# Patient Record
Sex: MALE | Race: WHITE | NOT HISPANIC OR LATINO | Employment: FULL TIME | URBAN - METROPOLITAN AREA
[De-identification: names, ages, dates, MRNs, and addresses within clinical notes are randomized per-mention and may not be internally consistent; named-entity substitution may affect disease eponyms.]

---

## 2023-01-31 PROBLEM — M25.859 FEMOROACETABULAR IMPINGEMENT: Status: ACTIVE | Noted: 2023-01-31

## 2023-01-31 PROBLEM — N40.0 BPH WITHOUT URINARY OBSTRUCTION: Status: ACTIVE | Noted: 2023-01-31

## 2023-01-31 PROBLEM — M54.50 CHRONIC LOW BACK PAIN: Status: ACTIVE | Noted: 2023-01-31

## 2023-01-31 PROBLEM — R82.998 DARK URINE: Status: ACTIVE | Noted: 2023-01-31

## 2023-01-31 PROBLEM — R53.83 FATIGUE: Status: ACTIVE | Noted: 2023-01-31

## 2023-01-31 PROBLEM — R10.9 ABDOMINAL PAIN: Status: ACTIVE | Noted: 2023-01-31

## 2023-01-31 PROBLEM — G89.4 CHRONIC PAIN SYNDROME: Status: ACTIVE | Noted: 2023-01-31

## 2023-01-31 PROBLEM — B37.49 YEAST DERMATITIS OF PENIS: Status: ACTIVE | Noted: 2023-01-31

## 2023-01-31 PROBLEM — J30.9 ALLERGIC RHINITIS: Status: ACTIVE | Noted: 2023-01-31

## 2023-01-31 PROBLEM — R10.84 ABDOMINAL CRAMPING, GENERALIZED: Status: ACTIVE | Noted: 2023-01-31

## 2023-01-31 PROBLEM — I10 HYPERTENSION, ESSENTIAL: Status: ACTIVE | Noted: 2023-01-31

## 2023-01-31 PROBLEM — J06.9 URTI (ACUTE UPPER RESPIRATORY INFECTION): Status: ACTIVE | Noted: 2023-01-31

## 2023-01-31 PROBLEM — E55.9 VITAMIN D DEFICIENCY: Status: ACTIVE | Noted: 2023-01-31

## 2023-01-31 PROBLEM — R73.9 HYPERGLYCEMIA: Status: ACTIVE | Noted: 2023-01-31

## 2023-01-31 PROBLEM — G47.00 INSOMNIA: Status: ACTIVE | Noted: 2023-01-31

## 2023-01-31 PROBLEM — R31.9 HEMATURIA, UNDIAGNOSED CAUSE: Status: ACTIVE | Noted: 2023-01-31

## 2023-01-31 PROBLEM — M47.812 ARTHRITIS OF NECK: Status: ACTIVE | Noted: 2023-01-31

## 2023-01-31 PROBLEM — E78.5 HYPERLIPIDEMIA: Status: ACTIVE | Noted: 2023-01-31

## 2023-01-31 PROBLEM — M54.81 CERVICO-OCCIPITAL NEURALGIA: Status: ACTIVE | Noted: 2023-01-31

## 2023-01-31 PROBLEM — G62.9 PERIPHERAL NEUROPATHY: Status: ACTIVE | Noted: 2023-01-31

## 2023-01-31 PROBLEM — K57.32 DIVERTICULITIS OF RECTOSIGMOID: Status: ACTIVE | Noted: 2023-01-31

## 2023-01-31 PROBLEM — L43.9 LICHEN PLANUS: Status: ACTIVE | Noted: 2023-01-31

## 2023-01-31 PROBLEM — R10.84 ABDOMINAL DISCOMFORT, GENERALIZED: Status: ACTIVE | Noted: 2023-01-31

## 2023-01-31 PROBLEM — K59.00 CONSTIPATION: Status: ACTIVE | Noted: 2023-01-31

## 2023-01-31 PROBLEM — R63.0 DECREASED APPETITE: Status: ACTIVE | Noted: 2023-01-31

## 2023-01-31 PROBLEM — G89.29 CHRONIC LOW BACK PAIN: Status: ACTIVE | Noted: 2023-01-31

## 2023-01-31 PROBLEM — G89.29 CHRONIC PAIN OF LEFT KNEE: Status: ACTIVE | Noted: 2023-01-31

## 2023-01-31 PROBLEM — M25.562 CHRONIC PAIN OF LEFT KNEE: Status: ACTIVE | Noted: 2023-01-31

## 2023-01-31 PROBLEM — Q64.4 URACHAL CYST: Status: ACTIVE | Noted: 2023-01-31

## 2023-01-31 PROBLEM — M19.90 DJD (DEGENERATIVE JOINT DISEASE): Status: ACTIVE | Noted: 2023-01-31

## 2023-01-31 RX ORDER — ZOLPIDEM TARTRATE 10 MG/1
1 TABLET ORAL NIGHTLY PRN
COMMUNITY
Start: 2022-10-06 | End: 2023-03-17 | Stop reason: SDUPTHER

## 2023-01-31 RX ORDER — CYCLOBENZAPRINE HCL 10 MG
1 TABLET ORAL NIGHTLY PRN
COMMUNITY
Start: 2017-02-06 | End: 2023-10-04 | Stop reason: SDUPTHER

## 2023-01-31 RX ORDER — HYOSCYAMINE SULFATE 0.12 MG/1
TABLET, ORALLY DISINTEGRATING ORAL
COMMUNITY
Start: 2022-04-06 | End: 2023-03-14 | Stop reason: ALTCHOICE

## 2023-01-31 RX ORDER — FAMOTIDINE 20 MG/1
1 TABLET, FILM COATED ORAL DAILY
COMMUNITY
End: 2023-03-17 | Stop reason: SDUPTHER

## 2023-01-31 RX ORDER — TACROLIMUS 1 MG/G
OINTMENT TOPICAL 2 TIMES DAILY
COMMUNITY
Start: 2022-01-13

## 2023-01-31 RX ORDER — ACETAMINOPHEN 325 MG/1
TABLET ORAL
COMMUNITY
Start: 2022-04-14 | End: 2023-03-14 | Stop reason: ALTCHOICE

## 2023-01-31 RX ORDER — ROSUVASTATIN CALCIUM 10 MG/1
1 TABLET, COATED ORAL DAILY
COMMUNITY
Start: 2022-10-03 | End: 2023-03-17 | Stop reason: SDUPTHER

## 2023-01-31 RX ORDER — BENZONATATE 200 MG/1
200 CAPSULE ORAL 3 TIMES DAILY PRN
COMMUNITY
End: 2023-03-14 | Stop reason: ALTCHOICE

## 2023-01-31 RX ORDER — IBUPROFEN 400 MG/1
1 TABLET ORAL EVERY 6 HOURS PRN
COMMUNITY
End: 2023-12-30 | Stop reason: HOSPADM

## 2023-03-14 ENCOUNTER — OFFICE VISIT (OUTPATIENT)
Dept: PRIMARY CARE | Facility: CLINIC | Age: 58
End: 2023-03-14
Payer: COMMERCIAL

## 2023-03-14 VITALS
DIASTOLIC BLOOD PRESSURE: 87 MMHG | SYSTOLIC BLOOD PRESSURE: 151 MMHG | OXYGEN SATURATION: 99 % | WEIGHT: 197 LBS | HEIGHT: 71 IN | HEART RATE: 84 BPM | BODY MASS INDEX: 27.58 KG/M2

## 2023-03-14 DIAGNOSIS — M47.812 ARTHRITIS OF NECK: ICD-10-CM

## 2023-03-14 DIAGNOSIS — I10 HYPERTENSION, ESSENTIAL: Primary | ICD-10-CM

## 2023-03-14 DIAGNOSIS — I10 HYPERTENSION, UNSPECIFIED TYPE: ICD-10-CM

## 2023-03-14 DIAGNOSIS — E78.5 HYPERLIPIDEMIA, UNSPECIFIED HYPERLIPIDEMIA TYPE: ICD-10-CM

## 2023-03-14 DIAGNOSIS — Z02.83 ENCOUNTER FOR DRUG SCREENING: ICD-10-CM

## 2023-03-14 DIAGNOSIS — J32.9 SINUSITIS, UNSPECIFIED CHRONICITY, UNSPECIFIED LOCATION: ICD-10-CM

## 2023-03-14 DIAGNOSIS — K21.9 GERD WITHOUT ESOPHAGITIS: ICD-10-CM

## 2023-03-14 DIAGNOSIS — F51.01 PRIMARY INSOMNIA: ICD-10-CM

## 2023-03-14 PROBLEM — R53.83 FATIGUE: Status: RESOLVED | Noted: 2023-01-31 | Resolved: 2023-03-14

## 2023-03-14 PROBLEM — J06.9 URTI (ACUTE UPPER RESPIRATORY INFECTION): Status: RESOLVED | Noted: 2023-01-31 | Resolved: 2023-03-14

## 2023-03-14 PROBLEM — R73.9 HYPERGLYCEMIA: Status: RESOLVED | Noted: 2023-01-31 | Resolved: 2023-03-14

## 2023-03-14 PROBLEM — R63.0 DECREASED APPETITE: Status: RESOLVED | Noted: 2023-01-31 | Resolved: 2023-03-14

## 2023-03-14 PROBLEM — E55.9 VITAMIN D DEFICIENCY: Status: RESOLVED | Noted: 2023-01-31 | Resolved: 2023-03-14

## 2023-03-14 PROCEDURE — 80365 DRUG SCREENING OXYCODONE: CPT

## 2023-03-14 PROCEDURE — 3077F SYST BP >= 140 MM HG: CPT | Performed by: FAMILY MEDICINE

## 2023-03-14 PROCEDURE — 1036F TOBACCO NON-USER: CPT | Performed by: FAMILY MEDICINE

## 2023-03-14 PROCEDURE — 80358 DRUG SCREENING METHADONE: CPT

## 2023-03-14 PROCEDURE — 80354 DRUG SCREENING FENTANYL: CPT

## 2023-03-14 PROCEDURE — 80373 DRUG SCREENING TRAMADOL: CPT

## 2023-03-14 PROCEDURE — 3079F DIAST BP 80-89 MM HG: CPT | Performed by: FAMILY MEDICINE

## 2023-03-14 PROCEDURE — 80307 DRUG TEST PRSMV CHEM ANLYZR: CPT

## 2023-03-14 PROCEDURE — 80361 OPIATES 1 OR MORE: CPT

## 2023-03-14 PROCEDURE — 99214 OFFICE O/P EST MOD 30 MIN: CPT | Performed by: FAMILY MEDICINE

## 2023-03-14 PROCEDURE — 80346 BENZODIAZEPINES1-12: CPT

## 2023-03-14 PROCEDURE — 80368 SEDATIVE HYPNOTICS: CPT

## 2023-03-14 RX ORDER — AZELASTINE 1 MG/ML
1 SPRAY, METERED NASAL 2 TIMES DAILY
Qty: 30 ML | Refills: 2 | Status: SHIPPED | OUTPATIENT
Start: 2023-03-14 | End: 2023-10-25

## 2023-03-14 ASSESSMENT — ENCOUNTER SYMPTOMS: SINUS PRESSURE: 1

## 2023-03-14 ASSESSMENT — PATIENT HEALTH QUESTIONNAIRE - PHQ9
2. FEELING DOWN, DEPRESSED OR HOPELESS: NOT AT ALL
1. LITTLE INTEREST OR PLEASURE IN DOING THINGS: NOT AT ALL
SUM OF ALL RESPONSES TO PHQ9 QUESTIONS 1 & 2: 0

## 2023-03-14 NOTE — PATIENT INSTRUCTIONS
Decrease in the amount of salt in your diet.   Begin taking your Blood Pressure 2x per day for the next week and writing down your results.   Your goal is to be less than 130/80,  if you have two or more readings that are greater than the goal, please call the office.

## 2023-03-14 NOTE — PROGRESS NOTES
"Subjective   Patient ID: Sharath Weiss is a 57 y.o. male who presents for 6 month medical management.    HPI     Review of Systems   HENT:  Positive for congestion and sinus pressure.         Tinnitus   All other systems reviewed and are negative.      Objective   /87   Pulse 84   Ht 1.803 m (5' 11\")   Wt 89.4 kg (197 lb)   SpO2 99%   BMI 27.48 kg/m²   Rechecked 151/87    Physical Exam  Vitals reviewed.   Constitutional:       Appearance: Normal appearance. He is normal weight.   HENT:      Head: Normocephalic and atraumatic.      Right Ear: Tympanic membrane, ear canal and external ear normal.      Left Ear: Tympanic membrane, ear canal and external ear normal.      Nose: Nose normal.      Mouth/Throat:      Mouth: Mucous membranes are moist.      Pharynx: Oropharynx is clear.   Eyes:      Conjunctiva/sclera: Conjunctivae normal.   Cardiovascular:      Rate and Rhythm: Normal rate and regular rhythm.   Pulmonary:      Effort: Pulmonary effort is normal.      Breath sounds: Normal breath sounds.   Abdominal:      General: Abdomen is flat. Bowel sounds are normal.      Palpations: Abdomen is soft.   Musculoskeletal:      Cervical back: Normal range of motion and neck supple.   Skin:     General: Skin is warm and dry.   Neurological:      General: No focal deficit present.      Mental Status: He is alert and oriented to person, place, and time.   Psychiatric:         Mood and Affect: Mood normal.         Behavior: Behavior normal.         Assessment/Plan   Diagnoses and all orders for this visit:  Hypertension, essential  Hypertension, unspecified type  -     Comprehensive Metabolic Panel; Future  Educated on DASH diet, Home BP log x1 week.  Encounter for drug screening  -     Opiate/Opioid/Benzo Extended Prescription Compliance  Hyperlipidemia, unspecified hyperlipidemia type  -     Lipid Panel  Primary insomnia  -  Continue ambien every day. It continues to provide adequate sleep for the patient. The " patient reports no hallucinations/sleep walking.   Arthritis of neck  Sinusitis, unspecified chronicity, unspecified location  -     azelastine (Astelin) 137 mcg (0.1 %) nasal spray; Administer 1 spray into each nostril in the morning and 1 spray before bedtime. Use in each nostril as directed.

## 2023-03-14 NOTE — PROGRESS NOTES
"Subjective   Patient ID: Sharath Weiss is a 57 y.o. male who presents for 6 month medical management.    HPI   Compliant with medications.    Insomnia controlled with Ambien taken nightly.  Denies side effects  Does not exercise regularly due to chronic low back pains Using Flexeril sparingly.    GERD symptoms minimal as long as he takes Pepcid    Intermittent abdominal pain has lessened.  GI Evaluation has been negative so far.  Levsin was ineffective.       Review of Systems   HENT:  Positive for congestion and sinus pressure.         Tinnitus   All other systems reviewed and are negative.      Objective   /81   Pulse 84   Ht 1.803 m (5' 11\")   Wt 89.4 kg (197 lb)   SpO2 99%   BMI 27.48 kg/m²   Rechecked 151/87    Physical Exam  PHYSICAL EXAM:  Alert and oriented x3.  Eyes: EOM grossly intact  Neck supple without lymph adenopathy or carotid bruit.  No masses or thyromegaly  Heart regular rate and rhythm without murmur.  Lungs clear to auscultation.  Legs without edema.  Gait is non-antalgic  Speech clear.  Hearing adequate.   General Medical Management Note        Past Medical History:   Diagnosis Date    Bilateral primary osteoarthritis of knee     Primary osteoarthritis of both knees    Pain in left hip 10/30/2017    Hip pain, acute, left    Personal history of nicotine dependence 07/21/2019    History of nicotine dependence    Personal history of other diseases of the digestive system 03/30/2016    History of esophageal reflux    Personal history of other diseases of the digestive system 12/28/2015    History of esophageal ulcer    Primary osteoarthritis, unspecified shoulder     Osteoarthritis of shoulder region    Pyogenic arthritis, unspecified (CMS/[facility]) 05/03/2018    Septic arthritis of knee    Pyogenic arthritis, unspecified (CMS/[facility])     Septic arthritis of knee    Sleep disorder, unspecified     Disturbance of sleep    Spondylosis without myelopathy or radiculopathy, site unspecified     " Arthritis of low back    Tobacco abuse counseling 03/02/2017    Encounter for smoking cessation counseling      Past Surgical History:   Procedure Laterality Date    ANTERIOR CRUCIATE LIGAMENT REPAIR  04/24/2015    Primary Repair Of Knee Ligament Cruciate Anterior    COLONOSCOPY  11/02/2022    Complete Colonoscopy    KNEE ARTHROSCOPY W/ DEBRIDEMENT  04/24/2015    Knee Arthroscopy (Therapeutic)    KNEE SURGERY  02/21/2018    Knee Surgery Left    LUMBAR FUSION  04/24/2015    Lumbar Vertebral Fusion    LUMBAR LAMINECTOMY  04/24/2015    Laminectomy Lumbar    OTHER SURGICAL HISTORY  07/13/2020    Epidural steroid injection    OTHER SURGICAL HISTORY  02/22/2018    Arthroscopy Shoulder Right     Family History   Problem Relation Name Age of Onset    Cancer Mother      Hypertension Father      Irritable bowel syndrome Sister        Social History     Socioeconomic History    Marital status:      Spouse name: Not on file    Number of children: Not on file    Years of education: Not on file    Highest education level: Not on file   Occupational History    Not on file   Tobacco Use    Smoking status: Never    Smokeless tobacco: Never   Vaping Use    Vaping status: Not on file   Substance and Sexual Activity    Alcohol use: Not on file    Drug use: Not on file    Sexual activity: Not on file   Other Topics Concern    Not on file   Social History Narrative    Not on file     Social Determinants of Health     Financial Resource Strain: Not on file   Food Insecurity: Not on file   Transportation Needs: Not on file   Physical Activity: Not on file   Stress: Not on file   Social Connections: Not on file   Intimate Partner Violence: Not on file   Housing Stability: Not on file       Current Outpatient Medications on File Prior to Visit   Medication Sig Dispense Refill    cyclobenzaprine (Flexeril) 10 mg tablet Take 1 tablet (10 mg) by mouth as needed at bedtime.      famotidine (Pepcid) 20 mg tablet Take 1 tablet (20 mg) by  "mouth once daily.      ibuprofen 400 mg tablet Take 1 tablet (400 mg) by mouth every 6 hours if needed for mild pain (1 - 3).      Lidocaine, diphenhydrAMINE, Maalox 1:1:1 (Magic Mouthwash) 500--40 mg/30 mL liquid mouthwash Take 30 mL by mouth 3 times a day as needed (severe bdominal pain).      rosuvastatin (Crestor) 10 mg tablet Take 1 tablet (10 mg) by mouth once daily.      tacrolimus (Protopic) 0.1 % ointment in the morning and at bedtime. APPLY AND GENTLY MASSAGE INTO AFFECTED AREA(S)      zolpidem (Ambien) 10 mg tablet Take 1 tablet (10 mg) by mouth as needed at bedtime.      [DISCONTINUED] acetaminophen (Tylenol) 325 mg tablet Take by mouth.      [DISCONTINUED] benzonatate (Tessalon) 200 mg capsule Take 1 capsule (200 mg) by mouth 3 times a day as needed. Do not crush or chew.      [DISCONTINUED] hyoscyamine (Anaspaz) 0.125 mg disintegrating tablet Place under the tongue.       No current facility-administered medications on file prior to visit.       No Known Allergies      ROS: Denies chest pain, SOB, Headache, GI problems     Visit Vitals  /87   Pulse 84   Ht 1.803 m (5' 11\")   Wt 89.4 kg (197 lb)   SpO2 99%   BMI 27.48 kg/m²   Smoking Status Never   BSA 2.12 m²        PHYSICAL EXAM:  Alert and oriented x3.  Eyes: EOM grossly intact  Neck supple without lymph adenopathy or carotid bruit.  No masses or thyromegaly  Heart regular rate and rhythm without murmur.  Lungs clear to auscultation.  Legs without edema.  Gait is non-antalgic  Speech clear.  Hearing adequate.          DIAGNOSIS/PLAN:  2. Encounter for drug screening  - Opiate/Opioid/Benzo Extended Prescription Compliance    4. Hyperlipidemia, unspecified hyperlipidemia type  Hyperlipidemia:  Patient to continue medication.  Emphasize diet and exercise.  Explained importance of risk factor modification.  Emphasized importance of compliance to decrease risk for heart attack, stroke and peripheral artery disease.    - Lipid Panel  - " rosuvastatin (Crestor) 10 mg tablet; Take 1 tablet (10 mg) by mouth once daily.  Dispense: 90 tablet; Refill: 2    5. Primary insomnia  CONTROLLED SUBSTANCE USE:   Patient is aware of Dr. Barney's and Vanessa Quintana's practice rules for use of scheduled medication.  Has a signed contract stating that patient will only receive controlled substance prescriptions from Dr. Barney, will only receive a one month supply, will fill prescriptions at one pharmacy, and agrees to a random urine drug screen.  Patient is aware that she must have an office appointment every 90 days to continue to receive benzodiazepines or narcotics.    - zolpidem (Ambien) 10 mg tablet; Take 1 tablet (10 mg) by mouth as needed at bedtime for sleep.  Dispense: 90 tablet; Refill: 1    7. Sinusitis, unspecified chronicity, unspecified location  - Omnicef 300mg BID x 10 day  - azelastine (Astelin) 137 mcg (0.1 %) nasal spray; Administer 1 spray into each nostril in the morning and 1 spray before bedtime. Use in each nostril as directed.  Dispense: 30 mL; Refill: 2    8. GERD without esophagitis  - famotidine (Pepcid) 20 mg tablet; Take 1 tablet (20 mg) by mouth in the morning and 1 tablet (20 mg) before bedtime.  Dispense: 180 tablet; Refill: 2        Return to office in 6 months for comprehensive medical evaluation, long-term medication use monitoring, and preventative services screening    Will continue to monitor, evaluate, assess and treat all problems/diagnoses as appropriate and continue to collaborate with specialists.    Encouraged to sign up with [facility] / Follow My Health     Contact office or send a Follow My Health message with any questions or concerns    Patient will only be notified of labs that require medical intervention.    Prescriptions will not be filled unless you are compliant with your follow up appointments or have a follow up appointment scheduled as per instruction of your physician. Refills should be requested at the time of  your visit.    **Charting was completed using voice recognition technology and may include unintended errors**    Buster Barney DO, FACOFP  Senior Attending Physician  [facility]  [address], [address]  [place] [address] [phone]           Buster Barnye DO, FACOFP          Assessment/Plan   Diagnoses and all orders for this visit:  Hypertension, essential  Hypertension, unspecified type  -     Comprehensive Metabolic Panel; Future  Encounter for drug screening  -     Opiate/Opioid/Benzo Extended Prescription Compliance  Hyperlipidemia, unspecified hyperlipidemia type  -     Lipid Panel  Primary insomnia  Arthritis of neck  Allergic rhinitis, unspecified seasonality, unspecified trigger  Sinusitis, unspecified chronicity, unspecified location  -     azelastine (Astelin) 137 mcg (0.1 %) nasal spray; Administer 1 spray into each nostril in the morning and 1 spray before bedtime. Use in each nostril as directed.  Other orders  -     Follow Up In Primary Care; Future

## 2023-03-16 ENCOUNTER — TELEPHONE (OUTPATIENT)
Dept: PRIMARY CARE | Facility: CLINIC | Age: 58
End: 2023-03-16
Payer: COMMERCIAL

## 2023-03-16 DIAGNOSIS — J32.9 SINUSITIS, UNSPECIFIED CHRONICITY, UNSPECIFIED LOCATION: Primary | ICD-10-CM

## 2023-03-16 LAB
6-ACETYLMORPHINE: <25 NG/ML
7-AMINOCLONAZEPAM: <25 NG/ML
ALPHA-HYDROXYALPRAZOLAM: <25 NG/ML
ALPHA-HYDROXYMIDAZOLAM: <25 NG/ML
ALPRAZOLAM: <25 NG/ML
AMPHETAMINE (PRESENCE) IN URINE BY SCREEN METHOD: ABNORMAL
BARBITURATES PRESENCE IN URINE BY SCREEN METHOD: ABNORMAL
CANNABINOIDS IN URINE BY SCREEN METHOD: ABNORMAL
CHLORDIAZEPOXIDE: <25 NG/ML
CLONAZEPAM: <25 NG/ML
COCAINE (PRESENCE) IN URINE BY SCREEN METHOD: ABNORMAL
CODEINE: <50 NG/ML
CREATINE, URINE FOR DRUG: 55.3 MG/DL
DIAZEPAM: <25 NG/ML
DRUG SCREEN COMMENT URINE: ABNORMAL
EDDP: <25 NG/ML
FENTANYL CONFIRMATION, URINE: <2.5 NG/ML
HYDROCODONE: <25 NG/ML
HYDROMORPHONE: <25 NG/ML
LORAZEPAM: <25 NG/ML
METHADONE CONFIRMATION,URINE: <25 NG/ML
MIDAZOLAM: <25 NG/ML
MORPHINE URINE: <50 NG/ML
NORDIAZEPAM: <25 NG/ML
NORFENTANYL: <2.5 NG/ML
NORHYDROCODONE: <25 NG/ML
NOROXYCODONE: <25 NG/ML
O-DESMETHYLTRAMADOL: <50 NG/ML
OXAZEPAM: <25 NG/ML
OXYCODONE: <25 NG/ML
OXYMORPHONE: <25 NG/ML
PHENCYCLIDINE (PRESENCE) IN URINE BY SCREEN METHOD: ABNORMAL
TEMAZEPAM: <25 NG/ML
TRAMADOL: <50 NG/ML
ZOLPIDEM METABOLITE (ZCA): >1000 NG/ML
ZOLPIDEM: <25 NG/ML

## 2023-03-17 RX ORDER — FAMOTIDINE 20 MG/1
20 TABLET, FILM COATED ORAL 2 TIMES DAILY
Qty: 180 TABLET | Refills: 2 | Status: SHIPPED
Start: 2023-03-17 | End: 2023-12-01 | Stop reason: WASHOUT

## 2023-03-17 RX ORDER — ROSUVASTATIN CALCIUM 10 MG/1
10 TABLET, COATED ORAL DAILY
Qty: 90 TABLET | Refills: 2 | Status: SHIPPED | OUTPATIENT
Start: 2023-03-17 | End: 2023-10-04 | Stop reason: SDUPTHER

## 2023-03-17 RX ORDER — ZOLPIDEM TARTRATE 10 MG/1
10 TABLET ORAL NIGHTLY PRN
Qty: 90 TABLET | Refills: 1 | Status: SHIPPED | OUTPATIENT
Start: 2023-03-17 | End: 2023-10-04 | Stop reason: SDUPTHER

## 2023-03-17 RX ORDER — CEFDINIR 300 MG/1
300 CAPSULE ORAL 2 TIMES DAILY
Qty: 20 CAPSULE | Refills: 0 | Status: SHIPPED | OUTPATIENT
Start: 2023-03-17 | End: 2023-03-27

## 2023-03-18 ENCOUNTER — LAB (OUTPATIENT)
Dept: LAB | Facility: LAB | Age: 58
End: 2023-03-18
Payer: COMMERCIAL

## 2023-03-18 DIAGNOSIS — I10 HYPERTENSION, ESSENTIAL: ICD-10-CM

## 2023-03-18 LAB
ALANINE AMINOTRANSFERASE (SGPT) (U/L) IN SER/PLAS: 21 U/L (ref 10–52)
ALBUMIN (G/DL) IN SER/PLAS: 4.8 G/DL (ref 3.4–5)
ALKALINE PHOSPHATASE (U/L) IN SER/PLAS: 44 U/L (ref 33–120)
ANION GAP IN SER/PLAS: 12 MMOL/L (ref 10–20)
ASPARTATE AMINOTRANSFERASE (SGOT) (U/L) IN SER/PLAS: 20 U/L (ref 9–39)
BILIRUBIN TOTAL (MG/DL) IN SER/PLAS: 0.5 MG/DL (ref 0–1.2)
CALCIUM (MG/DL) IN SER/PLAS: 9.5 MG/DL (ref 8.6–10.3)
CARBON DIOXIDE, TOTAL (MMOL/L) IN SER/PLAS: 29 MMOL/L (ref 21–32)
CHLORIDE (MMOL/L) IN SER/PLAS: 103 MMOL/L (ref 98–107)
CHOLESTEROL (MG/DL) IN SER/PLAS: 158 MG/DL (ref 0–199)
CHOLESTEROL IN HDL (MG/DL) IN SER/PLAS: 61.1 MG/DL
CHOLESTEROL/HDL RATIO: 2.6
CREATININE (MG/DL) IN SER/PLAS: 1.07 MG/DL (ref 0.5–1.3)
GFR MALE: 81 ML/MIN/1.73M2
GLUCOSE (MG/DL) IN SER/PLAS: 99 MG/DL (ref 74–99)
LDL: 79 MG/DL (ref 0–99)
POTASSIUM (MMOL/L) IN SER/PLAS: 3.8 MMOL/L (ref 3.5–5.3)
PROTEIN TOTAL: 7 G/DL (ref 6.4–8.2)
SODIUM (MMOL/L) IN SER/PLAS: 140 MMOL/L (ref 136–145)
TRIGLYCERIDE (MG/DL) IN SER/PLAS: 90 MG/DL (ref 0–149)
UREA NITROGEN (MG/DL) IN SER/PLAS: 18 MG/DL (ref 6–23)
VLDL: 18 MG/DL (ref 0–40)

## 2023-03-18 PROCEDURE — 80053 COMPREHEN METABOLIC PANEL: CPT

## 2023-03-18 PROCEDURE — 36415 COLL VENOUS BLD VENIPUNCTURE: CPT

## 2023-03-18 PROCEDURE — 80061 LIPID PANEL: CPT

## 2023-09-20 PROBLEM — M16.11 OSTEOARTHRITIS OF RIGHT HIP: Status: ACTIVE | Noted: 2023-09-20

## 2023-09-20 PROBLEM — M19.022 ARTHRITIS OF ELBOW, LEFT: Status: ACTIVE | Noted: 2023-09-20

## 2023-09-20 PROBLEM — B35.1 TINEA UNGUIUM: Status: ACTIVE | Noted: 2023-08-09

## 2023-09-20 PROBLEM — M25.551 RIGHT HIP PAIN: Status: ACTIVE | Noted: 2023-09-20

## 2023-09-20 PROBLEM — R21 RASH AND OTHER NONSPECIFIC SKIN ERUPTION: Status: ACTIVE | Noted: 2023-08-09

## 2023-09-20 PROBLEM — L72.3 SEBACEOUS CYST: Status: ACTIVE | Noted: 2023-08-09

## 2023-09-20 PROBLEM — M65.311 TRIGGER FINGER OF RIGHT THUMB: Status: ACTIVE | Noted: 2023-09-20

## 2023-09-28 PROBLEM — M48.061 LUMBAR STENOSIS: Status: ACTIVE | Noted: 2023-09-28

## 2023-10-01 DIAGNOSIS — F51.01 PRIMARY INSOMNIA: ICD-10-CM

## 2023-10-02 RX ORDER — ZOLPIDEM TARTRATE 10 MG/1
10 TABLET ORAL NIGHTLY PRN
Qty: 90 TABLET | Refills: 0 | OUTPATIENT
Start: 2023-10-02

## 2023-10-02 ASSESSMENT — PROMIS GLOBAL HEALTH SCALE
RATE_SOCIAL_SATISFACTION: VERY GOOD
CARRYOUT_SOCIAL_ACTIVITIES: EXCELLENT
RATE_GENERAL_HEALTH: FAIR
RATE_AVERAGE_FATIGUE: MILD
RATE_AVERAGE_PAIN: 3
RATE_MENTAL_HEALTH: VERY GOOD
RATE_PHYSICAL_HEALTH: FAIR
EMOTIONAL_PROBLEMS: NEVER
RATE_QUALITY_OF_LIFE: GOOD
CARRYOUT_PHYSICAL_ACTIVITIES: MODERATELY

## 2023-10-03 ENCOUNTER — LAB (OUTPATIENT)
Dept: LAB | Facility: LAB | Age: 58
End: 2023-10-03
Payer: COMMERCIAL

## 2023-10-03 ENCOUNTER — TELEPHONE (OUTPATIENT)
Dept: PRIMARY CARE | Facility: CLINIC | Age: 58
End: 2023-10-03

## 2023-10-03 DIAGNOSIS — Z00.00 ENCOUNTER FOR HEALTH MAINTENANCE EXAMINATION: ICD-10-CM

## 2023-10-03 DIAGNOSIS — R53.83 FATIGUE, UNSPECIFIED TYPE: ICD-10-CM

## 2023-10-03 DIAGNOSIS — E78.5 HYPERLIPIDEMIA, UNSPECIFIED HYPERLIPIDEMIA TYPE: ICD-10-CM

## 2023-10-03 DIAGNOSIS — E55.9 VITAMIN D DEFICIENCY: ICD-10-CM

## 2023-10-03 DIAGNOSIS — E29.1 HYPOGONADISM MALE: ICD-10-CM

## 2023-10-03 DIAGNOSIS — N40.0 BENIGN PROSTATIC HYPERPLASIA, UNSPECIFIED WHETHER LOWER URINARY TRACT SYMPTOMS PRESENT: ICD-10-CM

## 2023-10-03 LAB
25(OH)D3 SERPL-MCNC: 25 NG/ML (ref 30–100)
ALBUMIN SERPL BCP-MCNC: 5 G/DL (ref 3.4–5)
ALP SERPL-CCNC: 47 U/L (ref 33–120)
ALT SERPL W P-5'-P-CCNC: 20 U/L (ref 10–52)
ANION GAP SERPL CALC-SCNC: 12 MMOL/L (ref 10–20)
AST SERPL W P-5'-P-CCNC: 18 U/L (ref 9–39)
BILIRUB SERPL-MCNC: 0.8 MG/DL (ref 0–1.2)
BUN SERPL-MCNC: 15 MG/DL (ref 6–23)
CALCIUM SERPL-MCNC: 9.9 MG/DL (ref 8.6–10.6)
CHLORIDE SERPL-SCNC: 104 MMOL/L (ref 98–107)
CO2 SERPL-SCNC: 29 MMOL/L (ref 21–32)
CREAT SERPL-MCNC: 0.88 MG/DL (ref 0.5–1.3)
ERYTHROCYTE [DISTWIDTH] IN BLOOD BY AUTOMATED COUNT: 12.4 % (ref 11.5–14.5)
GFR SERPL CREATININE-BSD FRML MDRD: >90 ML/MIN/1.73M*2
GLUCOSE SERPL-MCNC: 102 MG/DL (ref 74–99)
HCT VFR BLD AUTO: 42.7 % (ref 41–52)
HGB BLD-MCNC: 14.3 G/DL (ref 13.5–17.5)
MCH RBC QN AUTO: 31.8 PG (ref 26–34)
MCHC RBC AUTO-ENTMCNC: 33.5 G/DL (ref 32–36)
MCV RBC AUTO: 95 FL (ref 80–100)
NRBC BLD-RTO: 0 /100 WBCS (ref 0–0)
PLATELET # BLD AUTO: 227 X10*3/UL (ref 150–450)
PMV BLD AUTO: 9.9 FL (ref 7.5–11.5)
POTASSIUM SERPL-SCNC: 4.3 MMOL/L (ref 3.5–5.3)
PROT SERPL-MCNC: 7.1 G/DL (ref 6.4–8.2)
PSA SERPL-MCNC: 0.66 NG/ML
RBC # BLD AUTO: 4.5 X10*6/UL (ref 4.5–5.9)
SODIUM SERPL-SCNC: 141 MMOL/L (ref 136–145)
TESTOST SERPL-MCNC: 384 NG/DL (ref 240–1000)
TSH SERPL-ACNC: 1.1 MIU/L (ref 0.44–3.98)
WBC # BLD AUTO: 6.3 X10*3/UL (ref 4.4–11.3)

## 2023-10-03 PROCEDURE — 36415 COLL VENOUS BLD VENIPUNCTURE: CPT

## 2023-10-03 NOTE — TELEPHONE ENCOUNTER
Refill    Zolpidem (ambien) 10 mg tablet, take 1 tablet by mouth as needed at bedtime for sleep  LR: 3/17/23 90 tablets 1 refill    Pharm: [facility] [place]   [phone]    LV: 3/14/23  NV: 10/4/23

## 2023-10-04 ENCOUNTER — OFFICE VISIT (OUTPATIENT)
Dept: PRIMARY CARE | Facility: CLINIC | Age: 58
End: 2023-10-04
Payer: COMMERCIAL

## 2023-10-04 VITALS
WEIGHT: 197 LBS | DIASTOLIC BLOOD PRESSURE: 78 MMHG | HEART RATE: 66 BPM | BODY MASS INDEX: 27.58 KG/M2 | OXYGEN SATURATION: 98 % | HEIGHT: 71 IN | SYSTOLIC BLOOD PRESSURE: 140 MMHG

## 2023-10-04 DIAGNOSIS — K29.50 CHRONIC GASTRITIS WITHOUT BLEEDING, UNSPECIFIED GASTRITIS TYPE: ICD-10-CM

## 2023-10-04 DIAGNOSIS — Z00.00 ENCOUNTER FOR HEALTH MAINTENANCE EXAMINATION: Primary | ICD-10-CM

## 2023-10-04 DIAGNOSIS — N40.0 BENIGN PROSTATIC HYPERPLASIA, UNSPECIFIED WHETHER LOWER URINARY TRACT SYMPTOMS PRESENT: ICD-10-CM

## 2023-10-04 DIAGNOSIS — I10 HYPERTENSION, UNSPECIFIED TYPE: ICD-10-CM

## 2023-10-04 DIAGNOSIS — F51.01 PRIMARY INSOMNIA: ICD-10-CM

## 2023-10-04 DIAGNOSIS — E55.9 VITAMIN D DEFICIENCY: ICD-10-CM

## 2023-10-04 DIAGNOSIS — E29.1 HYPOGONADISM MALE: ICD-10-CM

## 2023-10-04 DIAGNOSIS — E78.5 HYPERLIPIDEMIA, UNSPECIFIED HYPERLIPIDEMIA TYPE: ICD-10-CM

## 2023-10-04 DIAGNOSIS — M16.11 OSTEOARTHRITIS OF RIGHT HIP, UNSPECIFIED OSTEOARTHRITIS TYPE: ICD-10-CM

## 2023-10-04 DIAGNOSIS — M19.011 OSTEOARTHRITIS OF RIGHT SHOULDER, UNSPECIFIED OSTEOARTHRITIS TYPE: ICD-10-CM

## 2023-10-04 LAB
APPEARANCE UR: CLEAR
BILIRUB UR STRIP.AUTO-MCNC: NEGATIVE MG/DL
COLOR UR: YELLOW
GLUCOSE UR STRIP.AUTO-MCNC: NEGATIVE MG/DL
KETONES UR STRIP.AUTO-MCNC: NEGATIVE MG/DL
LEUKOCYTE ESTERASE UR QL STRIP.AUTO: NEGATIVE
NITRITE UR QL STRIP.AUTO: NEGATIVE
PH UR STRIP.AUTO: 7 [PH]
PROT UR STRIP.AUTO-MCNC: NEGATIVE MG/DL
RBC # UR STRIP.AUTO: NEGATIVE /UL
SP GR UR STRIP.AUTO: 1.01
UROBILINOGEN UR STRIP.AUTO-MCNC: <2 MG/DL

## 2023-10-04 PROCEDURE — 3077F SYST BP >= 140 MM HG: CPT | Performed by: FAMILY MEDICINE

## 2023-10-04 PROCEDURE — 99213 OFFICE O/P EST LOW 20 MIN: CPT | Performed by: FAMILY MEDICINE

## 2023-10-04 PROCEDURE — 93000 ELECTROCARDIOGRAM COMPLETE: CPT | Performed by: FAMILY MEDICINE

## 2023-10-04 PROCEDURE — 1036F TOBACCO NON-USER: CPT | Performed by: FAMILY MEDICINE

## 2023-10-04 PROCEDURE — 3078F DIAST BP <80 MM HG: CPT | Performed by: FAMILY MEDICINE

## 2023-10-04 PROCEDURE — 99396 PREV VISIT EST AGE 40-64: CPT | Performed by: FAMILY MEDICINE

## 2023-10-04 RX ORDER — LISINOPRIL 10 MG/1
10 TABLET ORAL DAILY
Qty: 90 TABLET | Refills: 3 | Status: SHIPPED | OUTPATIENT
Start: 2023-10-04 | End: 2024-03-12 | Stop reason: SDUPTHER

## 2023-10-04 RX ORDER — ROSUVASTATIN CALCIUM 10 MG/1
10 TABLET, COATED ORAL DAILY
Qty: 90 TABLET | Refills: 2 | Status: SHIPPED | OUTPATIENT
Start: 2023-10-04 | End: 2024-03-12 | Stop reason: SDUPTHER

## 2023-10-04 RX ORDER — ZOLPIDEM TARTRATE 10 MG/1
10 TABLET ORAL NIGHTLY PRN
Qty: 90 TABLET | Refills: 1 | Status: SHIPPED | OUTPATIENT
Start: 2023-10-04 | End: 2024-03-12 | Stop reason: SDUPTHER

## 2023-10-04 RX ORDER — CYCLOBENZAPRINE HCL 10 MG
10 TABLET ORAL NIGHTLY PRN
Qty: 60 TABLET | Refills: 2 | Status: SHIPPED
Start: 2023-10-04 | End: 2023-12-30 | Stop reason: HOSPADM

## 2023-10-04 RX ORDER — CYCLOBENZAPRINE HCL 10 MG
10 TABLET ORAL NIGHTLY PRN
Qty: 60 TABLET | Refills: 2 | Status: SHIPPED
Start: 2023-10-04 | End: 2023-10-04 | Stop reason: SDUPTHER

## 2023-10-04 NOTE — PROGRESS NOTES
Annual Comprehensive Medical Exam    58 y.o. male presents for annual comprehensive medical evaluation and preventive services screening.  No recent hospitalizations, surgeries or significant injuries.    HPI    Needs total reverse shoulder replacement    Right groin/ant leg pain.  Worse with standing and walking.  Sleep interrupted.  Taking Advil, flexeril, ambien at bedtime    Chronic abdominal pain resolved after surgery.  Still using BMX solution prn and would like refill    Insomnia- ambien nightly    BP rising since not exercising due to right shoulder and right groin pain.  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  Immunizations are up-to-date except for tetanus  ECGs have been normal.  Cholesterol has been controlled with Crestor daily.  Patient is a non-smoker.  No family history of coronary artery disease.      Past Medical History:   Diagnosis Date    Bilateral primary osteoarthritis of knee     Primary osteoarthritis of both knees    Pain in left hip 10/30/2017    Hip pain, acute, left    Personal history of nicotine dependence 07/21/2019    History of nicotine dependence    Personal history of other diseases of the digestive system 03/30/2016    History of esophageal reflux    Personal history of other diseases of the digestive system 12/28/2015    History of esophageal ulcer    Primary osteoarthritis, unspecified shoulder     Osteoarthritis of shoulder region    Pyogenic arthritis, unspecified (CMS/[facility]) 05/03/2018    Septic arthritis of knee    Pyogenic arthritis, unspecified (CMS/[facility])     Septic arthritis of knee    Sleep disorder, unspecified     Disturbance of sleep    Spondylosis without myelopathy or radiculopathy, site unspecified     Arthritis of low back    Tobacco abuse counseling 03/02/2017    Encounter for smoking cessation counseling       Past Surgical History:   Procedure Laterality Date    ANTERIOR CRUCIATE LIGAMENT REPAIR  04/24/2015    Primary Repair Of Knee Ligament Cruciate Anterior    COLONOSCOPY  11/02/2022    Complete Colonoscopy    KNEE ARTHROSCOPY W/ DEBRIDEMENT  04/24/2015    Knee Arthroscopy (Therapeutic)    KNEE SURGERY  02/21/2018    Knee Surgery Left    LUMBAR FUSION  04/24/2015    Lumbar Vertebral Fusion    LUMBAR LAMINECTOMY  04/24/2015    Laminectomy Lumbar    OTHER SURGICAL HISTORY  07/13/2020    Epidural steroid injection    OTHER SURGICAL HISTORY  02/22/2018    Arthroscopy Shoulder Right     Family History   Problem Relation Name Age of Onset    Cancer Mother      Hypertension Father      Irritable bowel syndrome Sister        Social History     Socioeconomic History    Marital status:      Spouse name: Not on file    Number of children: Not on file    Years of education: Not on file    Highest education level: Not on file   Occupational History    Not on file   Tobacco Use    Smoking status: Never    Smokeless tobacco: Never   Substance and Sexual Activity    Alcohol use: Not on file    Drug use: Not on file    Sexual activity: Not on file   Other Topics Concern    Not on file   Social History Narrative    Not on file     Social Determinants of Health     Financial Resource Strain: Not on file   Food Insecurity: Not on file   Transportation Needs: Not on file   Physical Activity: Not on file   Stress: Not on file   Social Connections: Not on file   Intimate Partner Violence: Not on file   Housing Stability: Not on file       Current Outpatient Medications on File Prior to Visit   Medication Sig Dispense Refill    azelastine (Astelin) 137 mcg (0.1 %) nasal spray Administer 1 spray into each nostril in the morning and 1 spray before bedtime. Use in each nostril as directed. 30 mL 2    cyclobenzaprine (Flexeril) 10 mg tablet Take 1 tablet (10 mg) by mouth as needed at bedtime.      famotidine (Pepcid) 20 mg tablet Take 1  "tablet (20 mg) by mouth in the morning and 1 tablet (20 mg) before bedtime. 180 tablet 2    ibuprofen 400 mg tablet Take 1 tablet (400 mg) by mouth every 6 hours if needed for mild pain (1 - 3).      Lidocaine, diphenhydrAMINE, Maalox 1:1:1 (Magic Mouthwash) 769--40 mg/30 mL liquid mouthwash Take 30 mL by mouth 3 times a day as needed (severe bdominal pain).      rosuvastatin (Crestor) 10 mg tablet Take 1 tablet (10 mg) by mouth once daily. 90 tablet 2    tacrolimus (Protopic) 0.1 % ointment in the morning and at bedtime. APPLY AND GENTLY MASSAGE INTO AFFECTED AREA(S)      zolpidem (Ambien) 10 mg tablet Take 1 tablet (10 mg) by mouth as needed at bedtime for sleep. 90 tablet 1     No current facility-administered medications on file prior to visit.       No Known Allergies      Review of Systems:  Complete review of systems is negative today except for that mentioned in the history of present illness.  In particular patient denies chest pain, shortness of breath, headaches and GI disturbances.      Visit Vitals  /78   Pulse 66   Ht 1.803 m (5' 11\")   Wt 89.4 kg (197 lb)   SpO2 98%   BMI 27.48 kg/m²   Smoking Status Never   BSA 2.12 m²      Physical Exam  Gen: Alert and oriented ×3 male in no acute distress.  HEENT: Head is normocephalic.  Extraocular muscles are intact.  Tympanic membranes are clear.  Pharynx is clear.  Neck is supple without adenopathy or carotid bruits.  No masses or thyromegaly  Heart: Regular rate and rhythm without murmurs.  Lungs: Clear to auscultation bilaterally.  Abdomen: Soft with normal bowel sounds.  No masses or pain to palpation.  No bruits auscultated.  Extremities: There range of motion of all major joints.  No significant edema. Pedal pulses +1-2/4  Neuro: No signs of focal neurologic deficit.  No tremor.  Speech and hearing are normal.  DTRs +1/4;  Muscle Strength +5/5.  Musculoskeletal: Spine with decreased ROM.  No scoliosis.  Leg lengths are equal.  Skin: No " significant or irregular nevi visualized.  Psych: normal affect.  No suicidal ideation.  Good judgement and insight.       DIAGNOSIS/PLAN  1. Encounter for health maintenance examination  - Comprehensive Metabolic Panel; Future  - CBC; Future  - Lipid Panel; Future  - TSH with reflex to Free T4 if abnormal; Future  - Testosterone; Future  - Prostate Specific Antigen; Future  - Urinalysis with Reflex Microscopic; Future  - ECG 12 Lead    2. Hypertension, unspecified type  -Due to rising blood pressure and inability to exercise, will start lisinopril 10 mg daily.  Patient will be taking home blood pressure readings and notify me if blood pressures are greater than 130/80  - Follow Up In Primary Care  - ECG 12 Lead  - lisinopril 10 mg tablet; Take 1 tablet (10 mg) by mouth once daily.  Dispense: 90 tablet; Refill: 3    3. Hyperlipidemia, unspecified hyperlipidemia type  - Lipid Panel; Future  - ECG 12 Lead  - rosuvastatin (Crestor) 10 mg tablet; Take 1 tablet (10 mg) by mouth once daily.  Dispense: 90 tablet; Refill: 2    4. Chronic gastritis without bleeding, unspecified gastritis type  - lido-diphen-Maalox 1:1:1 Magic Mouthwash; Swish and swallow 30 mL 3 times a day as needed (severe bdominal pain).  Dispense: 300 mL; Refill: 2    5. Osteoarthritis of right hip, unspecified osteoarthritis type  - cyclobenzaprine (Flexeril) 10 mg tablet; Take 1 tablet (10 mg) by mouth as needed at bedtime for muscle spasms.  Dispense: 60 tablet; Refill: 2  -Follow-up with spine surgeon for definitive treatment    6. Osteoarthritis of right shoulder, unspecified osteoarthritis type  -Low up with orthopedic surgeon for definitive treatment.    7. Hypogonadism male  - Testosterone; Future    8. Benign prostatic hyperplasia, unspecified whether lower urinary tract symptoms present  - Prostate Specific Antigen; Future    9. Vitamin D deficiency  - Vitamin D 25-Hydroxy,Total (for eval of Vitamin D levels); Future    10. Primary  insomnia  Patient is aware of Dr. Barney's and Vanessa Quintana's practice rules for use of scheduled medication.  Has a signed contract stating that patient will only receive controlled substance prescriptions from Dr. Barney, will only receive a one month supply, will fill prescriptions at one pharmacy, and agrees to a random urine drug screen.  Patient is aware that she must have an office appointment every 90 days to continue to receive benzodiazepines or narcotics.    - zolpidem (Ambien) 10 mg tablet; Take 1 tablet (10 mg) by mouth as needed at bedtime for sleep.  Dispense: 90 tablet; Refill: 1        Follow up in 6 months for medical management; 12 months for annual comprehensive medical evaluation    I will continue to monitor, evaluate, assess and treat all problems/diagnoses as appropriate and continue to collaborate with specialists.    Contact office or send a  MY Chart message with any questions or concerns    Encouraged to sign up with my  My Chart  Patient will only be notified of labs that require medical intervention.    Prescriptions will not be filled unless you are compliant with your follow up appointments or have a follow up appointment scheduled as per instruction of your physician. Refills should be requested at the time of your visit.    **Charting was completed using voice recognition technology and may include unintended errors**    Buster Barney DO, RITA  [address] [address] [facility] [address] [facility] [address]  [place] [id number] [phone]    Buster Barney DO, RITA

## 2023-10-04 NOTE — PATIENT INSTRUCTIONS
Hypertension: Discussed importance of good blood pressure control to avoid long-term complications such as heart attack and stroke.  Patient is aware that blood pressure goal is less than 130/80.  Maintaining a regular exercise program and body mass index (BMI) less than 25 as well as a diet lower in carbohydrates will help reach these goals.

## 2023-10-05 LAB
CHOLEST SERPL-MCNC: 196 MG/DL (ref 0–199)
CHOLESTEROL/HDL RATIO: 2.6
HDLC SERPL-MCNC: 76.1 MG/DL
LDLC SERPL CALC-MCNC: 87 MG/DL (ref 140–190)
NON HDL CHOLESTEROL: 120 MG/DL (ref 0–149)
TRIGL SERPL-MCNC: 166 MG/DL (ref 0–149)
VLDL: 33 MG/DL (ref 0–40)

## 2023-10-20 ENCOUNTER — OFFICE (OUTPATIENT)
Dept: URBAN - METROPOLITAN AREA CLINIC 27 | Facility: CLINIC | Age: 58
End: 2023-10-20
Payer: COMMERCIAL

## 2023-10-20 VITALS
DIASTOLIC BLOOD PRESSURE: 79 MMHG | TEMPERATURE: 97.9 F | SYSTOLIC BLOOD PRESSURE: 145 MMHG | HEIGHT: 71 IN | HEART RATE: 63 BPM | WEIGHT: 191 LBS

## 2023-10-20 DIAGNOSIS — K59.09 OTHER CONSTIPATION: ICD-10-CM

## 2023-10-20 DIAGNOSIS — K29.50 CHRONIC GASTRITIS WITHOUT BLEEDING, UNSPECIFIED GASTRITIS TYPE: ICD-10-CM

## 2023-10-20 DIAGNOSIS — R10.13 EPIGASTRIC PAIN: ICD-10-CM

## 2023-10-20 DIAGNOSIS — F19.90 OTHER PSYCHOACTIVE SUBSTANCE USE, UNSPECIFIED, UNCOMPLICATED: ICD-10-CM

## 2023-10-20 DIAGNOSIS — R14.0 ABDOMINAL DISTENSION (GASEOUS): ICD-10-CM

## 2023-10-20 DIAGNOSIS — Z86.19 PERSONAL HISTORY OF OTHER INFECTIOUS AND PARASITIC DISEASES: ICD-10-CM

## 2023-10-20 PROCEDURE — 99214 OFFICE O/P EST MOD 30 MIN: CPT | Performed by: CLINICAL NURSE SPECIALIST

## 2023-10-23 ENCOUNTER — AMBULATORY SURGICAL CENTER (OUTPATIENT)
Dept: URBAN - METROPOLITAN AREA SURGERY 12 | Facility: SURGERY | Age: 58
End: 2023-10-23
Payer: COMMERCIAL

## 2023-10-23 ENCOUNTER — OFFICE (OUTPATIENT)
Dept: URBAN - METROPOLITAN AREA PATHOLOGY 2 | Facility: PATHOLOGY | Age: 58
End: 2023-10-23
Payer: COMMERCIAL

## 2023-10-23 VITALS
HEART RATE: 105 BPM | RESPIRATION RATE: 19 BRPM | HEART RATE: 73 BPM | OXYGEN SATURATION: 97 % | HEART RATE: 105 BPM | HEART RATE: 92 BPM | SYSTOLIC BLOOD PRESSURE: 145 MMHG | HEART RATE: 79 BPM | DIASTOLIC BLOOD PRESSURE: 72 MMHG | SYSTOLIC BLOOD PRESSURE: 123 MMHG | RESPIRATION RATE: 14 BRPM | RESPIRATION RATE: 11 BRPM | OXYGEN SATURATION: 97 % | DIASTOLIC BLOOD PRESSURE: 86 MMHG | SYSTOLIC BLOOD PRESSURE: 121 MMHG | OXYGEN SATURATION: 97 % | SYSTOLIC BLOOD PRESSURE: 105 MMHG | SYSTOLIC BLOOD PRESSURE: 105 MMHG | WEIGHT: 194 LBS | RESPIRATION RATE: 16 BRPM | DIASTOLIC BLOOD PRESSURE: 72 MMHG | DIASTOLIC BLOOD PRESSURE: 74 MMHG | DIASTOLIC BLOOD PRESSURE: 60 MMHG | DIASTOLIC BLOOD PRESSURE: 60 MMHG | RESPIRATION RATE: 14 BRPM | SYSTOLIC BLOOD PRESSURE: 102 MMHG | SYSTOLIC BLOOD PRESSURE: 102 MMHG | RESPIRATION RATE: 19 BRPM | RESPIRATION RATE: 12 BRPM | RESPIRATION RATE: 14 BRPM | WEIGHT: 194 LBS | SYSTOLIC BLOOD PRESSURE: 150 MMHG | SYSTOLIC BLOOD PRESSURE: 121 MMHG | HEIGHT: 71 IN | RESPIRATION RATE: 16 BRPM | RESPIRATION RATE: 7 BRPM | HEART RATE: 98 BPM | RESPIRATION RATE: 10 BRPM | TEMPERATURE: 98.6 F | SYSTOLIC BLOOD PRESSURE: 105 MMHG | DIASTOLIC BLOOD PRESSURE: 77 MMHG | SYSTOLIC BLOOD PRESSURE: 122 MMHG | OXYGEN SATURATION: 100 % | DIASTOLIC BLOOD PRESSURE: 50 MMHG | HEART RATE: 92 BPM | SYSTOLIC BLOOD PRESSURE: 122 MMHG | TEMPERATURE: 98.6 F | OXYGEN SATURATION: 99 % | RESPIRATION RATE: 7 BRPM | HEART RATE: 105 BPM | DIASTOLIC BLOOD PRESSURE: 74 MMHG | HEART RATE: 73 BPM | HEIGHT: 71 IN | OXYGEN SATURATION: 100 % | RESPIRATION RATE: 7 BRPM | WEIGHT: 194 LBS | SYSTOLIC BLOOD PRESSURE: 102 MMHG | HEIGHT: 71 IN | DIASTOLIC BLOOD PRESSURE: 86 MMHG | DIASTOLIC BLOOD PRESSURE: 86 MMHG | SYSTOLIC BLOOD PRESSURE: 150 MMHG | OXYGEN SATURATION: 100 % | OXYGEN SATURATION: 99 % | SYSTOLIC BLOOD PRESSURE: 121 MMHG | RESPIRATION RATE: 11 BRPM | DIASTOLIC BLOOD PRESSURE: 83 MMHG | DIASTOLIC BLOOD PRESSURE: 62 MMHG | DIASTOLIC BLOOD PRESSURE: 72 MMHG | DIASTOLIC BLOOD PRESSURE: 62 MMHG | HEART RATE: 73 BPM | DIASTOLIC BLOOD PRESSURE: 83 MMHG | DIASTOLIC BLOOD PRESSURE: 77 MMHG | DIASTOLIC BLOOD PRESSURE: 74 MMHG | SYSTOLIC BLOOD PRESSURE: 150 MMHG | RESPIRATION RATE: 12 BRPM | HEART RATE: 92 BPM | DIASTOLIC BLOOD PRESSURE: 60 MMHG | HEART RATE: 98 BPM | SYSTOLIC BLOOD PRESSURE: 123 MMHG | DIASTOLIC BLOOD PRESSURE: 77 MMHG | HEART RATE: 79 BPM | DIASTOLIC BLOOD PRESSURE: 50 MMHG | RESPIRATION RATE: 11 BRPM | HEART RATE: 79 BPM | DIASTOLIC BLOOD PRESSURE: 83 MMHG | SYSTOLIC BLOOD PRESSURE: 145 MMHG | SYSTOLIC BLOOD PRESSURE: 145 MMHG | SYSTOLIC BLOOD PRESSURE: 123 MMHG | RESPIRATION RATE: 10 BRPM | SYSTOLIC BLOOD PRESSURE: 122 MMHG | HEART RATE: 98 BPM | RESPIRATION RATE: 16 BRPM | RESPIRATION RATE: 19 BRPM | RESPIRATION RATE: 10 BRPM | DIASTOLIC BLOOD PRESSURE: 62 MMHG | OXYGEN SATURATION: 99 % | RESPIRATION RATE: 12 BRPM | DIASTOLIC BLOOD PRESSURE: 50 MMHG | TEMPERATURE: 98.6 F

## 2023-10-23 DIAGNOSIS — K22.2 ESOPHAGEAL OBSTRUCTION: ICD-10-CM

## 2023-10-23 DIAGNOSIS — R11.0 NAUSEA: ICD-10-CM

## 2023-10-23 DIAGNOSIS — K29.50 UNSPECIFIED CHRONIC GASTRITIS WITHOUT BLEEDING: ICD-10-CM

## 2023-10-23 PROBLEM — K31.89 OTHER DISEASES OF STOMACH AND DUODENUM: Status: ACTIVE | Noted: 2023-10-23

## 2023-10-23 PROCEDURE — G8907 PT DOC NO EVENTS ON DISCHARG: HCPCS | Performed by: INTERNAL MEDICINE

## 2023-10-23 PROCEDURE — 43239 EGD BIOPSY SINGLE/MULTIPLE: CPT | Performed by: INTERNAL MEDICINE

## 2023-10-23 PROCEDURE — 88342 IMHCHEM/IMCYTCHM 1ST ANTB: CPT | Performed by: PATHOLOGY

## 2023-10-23 PROCEDURE — 88305 TISSUE EXAM BY PATHOLOGIST: CPT | Performed by: PATHOLOGY

## 2023-10-23 PROCEDURE — 43450 DILATE ESOPHAGUS 1/MULT PASS: CPT | Performed by: INTERNAL MEDICINE

## 2023-10-23 RX ORDER — PANTOPRAZOLE 40 MG/1
TABLET, DELAYED RELEASE ORAL
Qty: 30 | Refills: 1 | Status: ACTIVE

## 2023-10-24 ENCOUNTER — OFFICE VISIT (OUTPATIENT)
Dept: DERMATOLOGY | Facility: CLINIC | Age: 58
End: 2023-10-24
Payer: COMMERCIAL

## 2023-10-24 DIAGNOSIS — L65.9 ALOPECIA: Primary | ICD-10-CM

## 2023-10-24 DIAGNOSIS — L21.9 SEBORRHEIC DERMATITIS: ICD-10-CM

## 2023-10-24 PROCEDURE — 88312 SPECIAL STAINS GROUP 1: CPT | Performed by: DERMATOLOGY

## 2023-10-24 PROCEDURE — 1036F TOBACCO NON-USER: CPT

## 2023-10-24 PROCEDURE — 88305 TISSUE EXAM BY PATHOLOGIST: CPT | Performed by: DERMATOLOGY

## 2023-10-24 PROCEDURE — 88305 TISSUE EXAM BY PATHOLOGIST: CPT | Mod: TC,DER

## 2023-10-24 PROCEDURE — 11104 PUNCH BX SKIN SINGLE LESION: CPT

## 2023-10-24 PROCEDURE — 99203 OFFICE O/P NEW LOW 30 MIN: CPT

## 2023-10-24 NOTE — PROGRESS NOTES
"Subjective   HPI: Sharath Weiss is a 58 y.o. male is a new patient here for evaluation and treatment of itchy dry scalp and thinning hair. Patient has noticed feeling \"peaks and valleys\" of his scalp over the past year as well as a generalized thinning of hair. Being treated for seb derm with ketoconazole shampoo 2x weekly. That helped abut 50%. Patient recently started using Rogaine.    ROS: No other skin or systemic complaints other than what is documented elsewhere in the note.    ALLERGIES: Vancomycin    SOCIAL:  reports that he has never smoked. He has never used smokeless tobacco. No history on file for alcohol use and drug use.    Objective   Scalp  Temporal recession with frontal thinning.    Multiple areas of 5-10 mm erythematous patches of missing hair bulbs.    Scalp  Symmetric erythematous patches overlying greasy scales.        Assessment/Plan   1. Alopecia  Scalp    4 mm punch today.    Treatment will depend on pathology report.     Patient can continue using Rogaine shampoo.    Skin biopsy - Scalp    Specimen 1 - Dermatopathology- DERM LAB  Differential Diagnosis: inflammatory alopecia  Check Margins Yes/No?:  no  Comments:    Dermpath Lab: Routine Histopathology (formalin-fixed tissue)    2. Seborrheic dermatitis  Scalp    Continue:  Ketoconazole shampoo 2x weekly    Discussed with patient chronic nature of seb derm.        FOLLOW UP: 1 week suture removal    The patient was encouraged to contact me with any further questions or concerns.  Hannah Holman PA-C  10/25/2023    "

## 2023-10-26 LAB
LABORATORY COMMENT REPORT: NORMAL
PATH REPORT.FINAL DX SPEC: NORMAL
PATH REPORT.GROSS SPEC: NORMAL
PATH REPORT.RELEVANT HX SPEC: NORMAL
PATH REPORT.TOTAL CANCER: NORMAL

## 2023-10-29 NOTE — RESULT ENCOUNTER NOTE
Appointment 10/31 w/ me.    Lupus panel will be ordered at next appointment. Discuss hydroxychloroquine.

## 2023-10-30 NOTE — PROGRESS NOTES
Subjective   HPI: Sharath Weiss is a 58 y.o. male is here for suture removal and biopsy results from 10/24/23. [id number] biopsy was consistent with discoid lupus erythematosus.    ROS: No other skin or systemic complaints other than what is documented elsewhere in the note.    ALLERGIES: Vancomycin    SOCIAL:  reports that he has never smoked. He has never used smokeless tobacco. No history on file for alcohol use and drug use.    Objective   Scalp  [id number].  Numerous patches of hair loss areas that are inflamed        Assessment/Plan   1. Discoid lupus erythematosus  Scalp    Start:  Plaquenil 200 mg PO every day    Biopsy-proven discoid lupus erythematosus.  Start Plaquenil 200 mg p.o. daily.  Education did patient on need for an antimalarial eye examination within the next few weeks.  Dr. Barillas was brought into the room and helped answer questions the patient had.  Ordering a lupus panel to test for systemic lupus.  Intralesional Kenalog injections to a couple of the lesions to see if hair will grow back.  We will check results in 1 month.    Related Procedures  JEANNE + GENESIS Panel  Dilute Al Viper Venom Time (DRVVT)  Silica Clotting Time  Lupus Anticoagulant with Interpretation (DRVVT + SCT)  Anti-Cardiolipin Antibody (IgA, IgG, and IgM)  Beta-2 Glycoprotein Antibodies (IgA, IgG and IgM)  C3 Complement  C4 Complement  Citrulline Antibody, IgG  RPR Monitoring    Related Medications  hydroxychloroquine (PlaqueniL) 200 mg tablet  Take 1 tablet (200 mg) by mouth once daily.         FOLLOW UP: 4 weeks    The patient was encouraged to contact me with any further questions or concerns.  Hannah Holman PA-C  10/31/2023

## 2023-10-31 ENCOUNTER — LAB (OUTPATIENT)
Dept: LAB | Facility: LAB | Age: 58
End: 2023-10-31
Payer: COMMERCIAL

## 2023-10-31 ENCOUNTER — OFFICE VISIT (OUTPATIENT)
Dept: DERMATOLOGY | Facility: CLINIC | Age: 58
End: 2023-10-31
Payer: COMMERCIAL

## 2023-10-31 DIAGNOSIS — L93.0 DISCOID LUPUS ERYTHEMATOSUS: ICD-10-CM

## 2023-10-31 LAB
B2 GLYCOPROT1 IGA SER-ACNC: <0.6 U/ML
B2 GLYCOPROT1 IGG SER-ACNC: <1.4 U/ML
B2 GLYCOPROT1 IGM SER-ACNC: 0.2 U/ML
C3 SERPL-MCNC: 139 MG/DL (ref 87–200)
C4 SERPL-MCNC: 35 MG/DL (ref 10–50)
CARDIOLIPIN IGA SERPL-ACNC: <0.5 APL U/ML
CARDIOLIPIN IGG SER IA-ACNC: <1.6 GPL U/ML
CARDIOLIPIN IGM SER IA-ACNC: 0.2 MPL U/ML
CCP IGG SERPL-ACNC: <1 U/ML
CENTROMERE B AB SER-ACNC: <0.2 AI
CHROMATIN AB SERPL-ACNC: <0.2 AI
DSDNA AB SER-ACNC: 1 IU/ML
ENA JO1 AB SER QL IA: <0.2 AI
ENA RNP AB SER IA-ACNC: <0.2 AI
ENA SCL70 AB SER QL IA: <0.2 AI
ENA SM AB SER IA-ACNC: <0.2 AI
ENA SM+RNP AB SER QL IA: <0.2 AI
ENA SS-A AB SER IA-ACNC: <0.2 AI
ENA SS-B AB SER IA-ACNC: <0.2 AI
RIBOSOMAL P AB SER-ACNC: <0.2 AI

## 2023-10-31 PROCEDURE — 86147 CARDIOLIPIN ANTIBODY EA IG: CPT

## 2023-10-31 PROCEDURE — 85730 THROMBOPLASTIN TIME PARTIAL: CPT

## 2023-10-31 PROCEDURE — 86160 COMPLEMENT ANTIGEN: CPT

## 2023-10-31 PROCEDURE — 3078F DIAST BP <80 MM HG: CPT

## 2023-10-31 PROCEDURE — 86038 ANTINUCLEAR ANTIBODIES: CPT

## 2023-10-31 PROCEDURE — 86235 NUCLEAR ANTIGEN ANTIBODY: CPT

## 2023-10-31 PROCEDURE — 11900 INJECT SKIN LESIONS </W 7: CPT

## 2023-10-31 PROCEDURE — 36415 COLL VENOUS BLD VENIPUNCTURE: CPT

## 2023-10-31 PROCEDURE — 85613 RUSSELL VIPER VENOM DILUTED: CPT

## 2023-10-31 PROCEDURE — 86200 CCP ANTIBODY: CPT

## 2023-10-31 PROCEDURE — 86146 BETA-2 GLYCOPROTEIN ANTIBODY: CPT

## 2023-10-31 PROCEDURE — 86225 DNA ANTIBODY NATIVE: CPT

## 2023-10-31 PROCEDURE — 3077F SYST BP >= 140 MM HG: CPT

## 2023-10-31 PROCEDURE — 99212 OFFICE O/P EST SF 10 MIN: CPT

## 2023-10-31 PROCEDURE — 86318 IA INFECTIOUS AGENT ANTIBODY: CPT

## 2023-10-31 PROCEDURE — 1036F TOBACCO NON-USER: CPT

## 2023-10-31 RX ORDER — HYDROXYCHLOROQUINE SULFATE 200 MG/1
200 TABLET, FILM COATED ORAL DAILY
Qty: 30 TABLET | Refills: 1 | Status: SHIPPED
Start: 2023-10-31 | End: 2023-12-01 | Stop reason: SDUPTHER

## 2023-10-31 NOTE — PROGRESS NOTES
Subjective     Sharath Weiss is a 58 y.o. male who presents for the following: Bx Results  ([id number] ).     Review of Systems:  No other skin or systemic complaints other than what is documented elsewhere in the note.    The following portions of the chart were reviewed this encounter and updated as appropriate:          Skin Cancer History  No skin cancer on file.      Specialty Problems          Dermatology Problems    Lichen planus    Yeast dermatitis of penis    Rash and other nonspecific skin eruption    Sebaceous cyst    Tinea unguium        Objective   Well appearing patient in no apparent distress; mood and affect are within normal limits.    A focused skin examination was performed. All findings within normal limits unless otherwise noted below.    Assessment/Plan   1. Discoid lupus erythematosus  Scalp

## 2023-11-01 ENCOUNTER — OFFICE VISIT (OUTPATIENT)
Dept: ORTHOPEDIC SURGERY | Facility: CLINIC | Age: 58
End: 2023-11-01
Payer: COMMERCIAL

## 2023-11-01 DIAGNOSIS — M48.061 DEGENERATIVE LUMBAR SPINAL STENOSIS: Primary | ICD-10-CM

## 2023-11-01 LAB
ANA SER QL HEP2 SUBST: NEGATIVE
DRVVT SCREEN TO CONFIRM RATIO: 1.01 RATIO
DRVVT/DRVVT CFM NRMLZD PPP-RTO: 0.97 RATIO
DRVVT/DRVVT CFM P DOAC NEUT NORM PPP-RTO: 1.04 RATIO
LA 2 SCREEN W REFLEX-IMP: NORMAL
NORMALIZED SCT PPP-RTO: 0.87 RATIO
RPR SER QL: NONREACTIVE
SILICA CLOTTING TIME CONFIRMATION: 1.05 RATIO
SILICA CLOTTING TIME SCREEN: 0.91 RATIO

## 2023-11-01 PROCEDURE — 99205 OFFICE O/P NEW HI 60 MIN: CPT | Performed by: ORTHOPAEDIC SURGERY

## 2023-11-01 PROCEDURE — 3077F SYST BP >= 140 MM HG: CPT | Performed by: ORTHOPAEDIC SURGERY

## 2023-11-01 PROCEDURE — 3078F DIAST BP <80 MM HG: CPT | Performed by: ORTHOPAEDIC SURGERY

## 2023-11-01 PROCEDURE — 1036F TOBACCO NON-USER: CPT | Performed by: ORTHOPAEDIC SURGERY

## 2023-11-01 NOTE — LETTER
November 1, 2023     Buster Barney DO  [address] [facility]  [address]  [facility] [address]    Patient: Sharath Weiss   YOB: 1965   Date of Visit: 11/1/2023       Dear Dr. Buster Barney DO:    Thank you for referring Sharath Weiss to me for evaluation. Below are my notes for this consultation.  If you have questions, please do not hesitate to call me. I look forward to following your patient along with you.       Sincerely,     Benji Huang MD      CC: No Recipients  ______________________________________________________________________________________    Antelmo is a pleasant 58-year-old [place] .  He is a former patient of Dr. Franca Salmeron.  Many years ago, he had a microdiscectomy at L4-5.  He did well with this.  Shortly thereafter he developed a recurrent disc herniation and underwent and in situ fusion and did quite well.    For several years, he has had episodic low back and leg pain of varying severity.    However, over the last 6 months he has been increasingly symptomatic with severe radiating right buttock and groin and anterior thigh pain.  He has bilateral calf pain with prolonged standing and walking.    Initially there was thought that this was a right hip problem.  He had imaging and even an ultrasound-guided injection of the right hip that did not provide relief.    He estimates he can walk for about 50 yards at a time.    He is done physical therapy over the last several months.  He has a friend that is a therapist and he has guided him.  He is not certain that there is documentation of extended physical therapy.    He does indicate that he is out of physical therapy visits.    He takes Motrin which helps.    He has a cervical spine issue and actually has a cervical epidural steroid injection planned for next week.    He has severe arthritis in his left knee and is contemplated a total knee replacement.    Family, social, and medical histories are obtained and  reviewed.    30-point, patient-recorded Review of Systems is personally obtained and reviewed. Inclusive is no history of weight loss, change in appetite, recent change in activity level, change in bowel or bladder habits, fevers, chills, malaise, or night pain.    On exam healthy-appearing man in moderate distress.  Healed midline lumbar scar.  Kyphotic posture.  Flexes to 45 degrees.  Painless motion both hips.  His strength is intact in the lower extremities without pathologic reflexes.    Florentino films show a solid fusion at L4-5.  He does have degenerative disc disease at L2-3 and L3-4.  No instability with flexion or extension.    His MRI shows severe stenosis at L2-3 in part due to a large extruded disc herniation on the right.  He has severe degenerative stenosis at L3-4 from hypertrophic ligamentum flavum and facet hypertrophy.    Impression: This man has had longstanding, chronic intermittent lumbar radiculopathy.  Over the last 6 months in particular, he has had more severe radiating right proximal lumbar radiculopathy.    He does have severe multilevel stenosis.  I suspect that the right-sided disc herniation at L2-3 is responsible for the more recent proximal symptoms in his right leg.    The degree of stenosis that he has would warrant consideration of surgery.  This would be in the form of a decompression and extension of his fusion to L2.  I think a decompression only would not be appropriate.  There would be consideration for a lateral approach with interbody fusions at L2-3 and L3-4 followed by her posterior decompression and instrumented fusion.    He believes he would like to try to postpone this.  He is retiring next year.    I have suggested that he do a dedicated course of physical therapy over the next 4 to 6 weeks and make certain that he documents that he has done this.    I think a consultation with pain management would be reasonable although with the degree of stenosis that he has I think  injections would be of limited long-term benefit.    Ultimately I do think he would be a very appropriate candidate for surgery.    He will keep me updated on his progress.    ** Dictated with voice recognition software and not immediately reviewed for errors in grammar and/or spelling **

## 2023-11-01 NOTE — PROGRESS NOTES
Antelmo is a pleasant 58-year-old [place] .  He is a former patient of Dr. Franca Salmeron.  Many years ago, he had a microdiscectomy at L4-5.  He did well with this.  Shortly thereafter he developed a recurrent disc herniation and underwent and in situ fusion and did quite well.    For several years, he has had episodic low back and leg pain of varying severity.    However, over the last 6 months he has been increasingly symptomatic with severe radiating right buttock and groin and anterior thigh pain.  He has bilateral calf pain with prolonged standing and walking.    Initially there was thought that this was a right hip problem.  He had imaging and even an ultrasound-guided injection of the right hip that did not provide relief.    He estimates he can walk for about 50 yards at a time.    He is done physical therapy over the last several months.  He has a friend that is a therapist and he has guided him.  He is not certain that there is documentation of extended physical therapy.    He does indicate that he is out of physical therapy visits.    He takes Motrin which helps.    He has a cervical spine issue and actually has a cervical epidural steroid injection planned for next week.    He has severe arthritis in his left knee and is contemplated a total knee replacement.    Family, social, and medical histories are obtained and reviewed.    30-point, patient-recorded Review of Systems is personally obtained and reviewed. Inclusive is no history of weight loss, change in appetite, recent change in activity level, change in bowel or bladder habits, fevers, chills, malaise, or night pain.    On exam healthy-appearing man in moderate distress.  Healed midline lumbar scar.  Kyphotic posture.  Flexes to 45 degrees.  Painless motion both hips.  His strength is intact in the lower extremities without pathologic reflexes.    Florentino films show a solid fusion at L4-5.  He does have degenerative disc disease at L2-3 and  L3-4.  No instability with flexion or extension.    His MRI shows severe stenosis at L2-3 in part due to a large extruded disc herniation on the right.  He has severe degenerative stenosis at L3-4 from hypertrophic ligamentum flavum and facet hypertrophy.    Impression: This man has had longstanding, chronic intermittent lumbar radiculopathy.  Over the last 6 months in particular, he has had more severe radiating right proximal lumbar radiculopathy.    He does have severe multilevel stenosis.  I suspect that the right-sided disc herniation at L2-3 is responsible for the more recent proximal symptoms in his right leg.    The degree of stenosis that he has would warrant consideration of surgery.  This would be in the form of a decompression and extension of his fusion to L2.  I think a decompression only would not be appropriate.  There would be consideration for a lateral approach with interbody fusions at L2-3 and L3-4 followed by her posterior decompression and instrumented fusion.    He believes he would like to try to postpone this.  He is retiring next year.    I have suggested that he do a dedicated course of physical therapy over the next 4 to 6 weeks and make certain that he documents that he has done this.    I think a consultation with pain management would be reasonable although with the degree of stenosis that he has I think injections would be of limited long-term benefit.    Ultimately I do think he would be a very appropriate candidate for surgery.    He will keep me updated on his progress.    ** Dictated with voice recognition software and not immediately reviewed for errors in grammar and/or spelling **

## 2023-11-09 DIAGNOSIS — L21.9 SEBORRHEIC DERMATITIS: Primary | ICD-10-CM

## 2023-11-09 RX ORDER — CICLOPIROX 1 G/100ML
SHAMPOO TOPICAL
Qty: 120 ML | Refills: 11 | Status: SHIPPED | OUTPATIENT
Start: 2023-11-09

## 2023-11-15 ENCOUNTER — TELEPHONE (OUTPATIENT)
Dept: DERMATOLOGY | Facility: CLINIC | Age: 58
End: 2023-11-15

## 2023-11-15 NOTE — TELEPHONE ENCOUNTER
Started a medication for lupus.  He has tested positive for covid. Should he stop taking the plaquenil while he has covid?

## 2023-11-20 ENCOUNTER — OFFICE VISIT (OUTPATIENT)
Dept: NEUROSURGERY | Facility: CLINIC | Age: 58
End: 2023-11-20
Payer: COMMERCIAL

## 2023-11-20 VITALS
HEIGHT: 71 IN | TEMPERATURE: 97.7 F | HEART RATE: 92 BPM | DIASTOLIC BLOOD PRESSURE: 78 MMHG | SYSTOLIC BLOOD PRESSURE: 128 MMHG | WEIGHT: 193 LBS | BODY MASS INDEX: 27.02 KG/M2

## 2023-11-20 DIAGNOSIS — M48.062 NEUROGENIC CLAUDICATION DUE TO LUMBAR SPINAL STENOSIS: Primary | ICD-10-CM

## 2023-11-20 PROCEDURE — 99203 OFFICE O/P NEW LOW 30 MIN: CPT | Performed by: STUDENT IN AN ORGANIZED HEALTH CARE EDUCATION/TRAINING PROGRAM

## 2023-11-20 PROCEDURE — 1036F TOBACCO NON-USER: CPT | Performed by: STUDENT IN AN ORGANIZED HEALTH CARE EDUCATION/TRAINING PROGRAM

## 2023-11-20 PROCEDURE — 3074F SYST BP LT 130 MM HG: CPT | Performed by: STUDENT IN AN ORGANIZED HEALTH CARE EDUCATION/TRAINING PROGRAM

## 2023-11-20 PROCEDURE — 3078F DIAST BP <80 MM HG: CPT | Performed by: STUDENT IN AN ORGANIZED HEALTH CARE EDUCATION/TRAINING PROGRAM

## 2023-11-20 ASSESSMENT — LIFESTYLE VARIABLES
HOW MANY STANDARD DRINKS CONTAINING ALCOHOL DO YOU HAVE ON A TYPICAL DAY: 1 OR 2
AUDIT-C TOTAL SCORE: 1
HOW OFTEN DO YOU HAVE A DRINK CONTAINING ALCOHOL: MONTHLY OR LESS
HOW OFTEN DO YOU HAVE SIX OR MORE DRINKS ON ONE OCCASION: NEVER
SKIP TO QUESTIONS 9-10: 1

## 2023-11-20 ASSESSMENT — PAIN SCALES - GENERAL: PAINLEVEL: 5

## 2023-11-20 ASSESSMENT — PATIENT HEALTH QUESTIONNAIRE - PHQ9
1. LITTLE INTEREST OR PLEASURE IN DOING THINGS: NOT AT ALL
2. FEELING DOWN, DEPRESSED OR HOPELESS: NOT AT ALL
SUM OF ALL RESPONSES TO PHQ9 QUESTIONS 1 & 2: 0

## 2023-11-20 NOTE — PROGRESS NOTES
[facility] [place]  Department of Neurological Surgery  New Patient Visit    History of Present Illness:  Sharath Weiss is a 58 y.o. year old male who presents to the spine clinic with chronic back pain. He has been having 5 months of increasing back pain, groin pain, and radiating pain into anterior thigh in the L2-3 distribution. The pain wakes up him at night. He has attempted conservative care with a home physical therapy program and medication. He sees Dr. Randy Walsh in pain management and has not had any recent injections in his low back. He has a hard time walking and performing normal day-to-day activities secondary to pain. 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 Main pain is 70% in his leg. He does not smoke. No diabetes. No history of heart disease. No prior neck surgeries.     Prior Spine Surgeries: L4-5 spinal fusion and L4-5 discectomy    Physical Therapy: Yes, at home  Diabetic: No   Osteoporosis: No  Patient's BMI is Body mass index is 26.92 kg/m².    Review of Systems:  14/14 systems reviewed and negative other than what is listed in the history of present illness    Patient Active Problem List   Diagnosis    Abdominal cramping, generalized    Abdominal discomfort, generalized    Abdominal pain    Allergic rhinitis    Arthritis of neck    BPH without urinary obstruction    Cervico-occipital neuralgia    Chronic low back pain    Chronic pain syndrome    Chronic pain of left knee    Constipation    Diverticulitis of rectosigmoid    DJD (degenerative joint disease)    Femoroacetabular impingement    Hematuria, undiagnosed cause    Hyperlipidemia    Hypertension, essential    Insomnia    Lichen planus    Peripheral neuropathy    Urachal cyst    Yeast dermatitis of penis    Arthritis of elbow, left    Osteoarthritis of right hip    Rash and other nonspecific skin eruption    Right hip pain    Sebaceous cyst    Tinea unguium    Trigger finger of right thumb     Lumbar stenosis     Past Medical History:   Diagnosis Date    Bilateral primary osteoarthritis of knee     Primary osteoarthritis of both knees    Pain in left hip 10/30/2017    Hip pain, acute, left    Personal history of nicotine dependence 2019    History of nicotine dependence    Personal history of other diseases of the digestive system 2016    History of esophageal reflux    Personal history of other diseases of the digestive system 2015    History of esophageal ulcer    Primary osteoarthritis, unspecified shoulder     Osteoarthritis of shoulder region    Pyogenic arthritis, unspecified (CMS/[facility]) 2018    Septic arthritis of knee    Pyogenic arthritis, unspecified (CMS/[facility])     Septic arthritis of knee    Sleep disorder, unspecified     Disturbance of sleep    Spondylosis without myelopathy or radiculopathy, site unspecified     Arthritis of low back    Tobacco abuse counseling 2017    Encounter for smoking cessation counseling     Past Surgical History:   Procedure Laterality Date    ANTERIOR CRUCIATE LIGAMENT REPAIR  2015    Primary Repair Of Knee Ligament Cruciate Anterior    COLONOSCOPY  2022    Complete Colonoscopy    KNEE ARTHROSCOPY W/ DEBRIDEMENT  2015    Knee Arthroscopy (Therapeutic)    KNEE SURGERY  2018    Knee Surgery Left    LUMBAR FUSION  2015    Lumbar Vertebral Fusion    LUMBAR LAMINECTOMY  2015    Laminectomy Lumbar    OTHER SURGICAL HISTORY  2020    Epidural steroid injection    OTHER SURGICAL HISTORY  2018    Arthroscopy Shoulder Right     Social History     Tobacco Use    Smoking status: Former     Types: Cigarettes     Quit date: 2018     Years since quittin.8    Smokeless tobacco: Never   Substance Use Topics    Alcohol use: Yes     family history includes Cancer in his mother; Hypertension in his father; Irritable bowel syndrome in his sister.    Current Outpatient Medications:     ciclopirox 1 % shampoo,  Wet hair and apply shampoo to scalp. Lather and leave on for 3 minutes. Rinse. Do this twice a week at night., Disp: 120 mL, Rfl: 11    cyclobenzaprine (Flexeril) 10 mg tablet, Take 1 tablet (10 mg) by mouth as needed at bedtime for muscle spasms., Disp: 60 tablet, Rfl: 2    hydroxychloroquine (PlaqueniL) 200 mg tablet, Take 1 tablet (200 mg) by mouth once daily., Disp: 30 tablet, Rfl: 1    ibuprofen 400 mg tablet, Take 1 tablet (400 mg) by mouth every 6 hours if needed for mild pain (1 - 3)., Disp: , Rfl:     lido-diphen-Maalox 1:1:1 Magic Mouthwash, Swish and swallow 30 mL 3 times a day as needed (severe bdominal pain)., Disp: 300 mL, Rfl: 2    lisinopril 10 mg tablet, Take 1 tablet (10 mg) by mouth once daily., Disp: 90 tablet, Rfl: 3    rosuvastatin (Crestor) 10 mg tablet, Take 1 tablet (10 mg) by mouth once daily., Disp: 90 tablet, Rfl: 2    zolpidem (Ambien) 10 mg tablet, Take 1 tablet (10 mg) by mouth as needed at bedtime for sleep., Disp: 90 tablet, Rfl: 1    azelastine (Astelin) 137 mcg (0.1 %) nasal spray, Administer 1 spray into each nostril in the morning and 1 spray before bedtime. Use in each nostril as directed., Disp: 30 mL, Rfl: 2    famotidine (Pepcid) 20 mg tablet, Take 1 tablet (20 mg) by mouth in the morning and 1 tablet (20 mg) before bedtime. (Patient not taking: Reported on 11/20/2023), Disp: 180 tablet, Rfl: 2    tacrolimus (Protopic) 0.1 % ointment, in the morning and at bedtime. APPLY AND GENTLY MASSAGE INTO AFFECTED AREA(S), Disp: , Rfl:   Allergies   Allergen Reactions    Vancomycin Nausea/vomiting, Anxiety and Tinnitus     Other reaction(s): Intolerance   Tinnitus and shaking       Physical Examination    General: Well developed, awake/alert/oriented x3, no distress, alert and cooperative  Skin: Warm and dry, no lesions, no rashes  ENMT: Mucous membranes moist, no apparent injury, no lesions seen  Head/Neck: Neck Supple, no apparent injury  Respiratory/Thorax: Normal breath sounds with  good chest expansion, thorax symmetric  Cardiovascular: No pitting edema, no JVD    Motor Strength: 5/5 Throughout all extremities    Muscle Bulk: Normal and symmetric in all extremities    Posture:   -- Cervical: Normal  -- Thoracic: Normal  -- Lumbar : Normal  Paraspinal muscle spasm/tenderness absent.     Sensation: intact to light touch    Back pain and RLE radiculopathy in L2-3 distribution of right leg.    Results    I personally reviewed and interpreted the imaging results which included MRI of lumbar spine showing post op changes at L4-5 from prior fusion and degenerative disc disease at L2-3, L3-4 with a disc herniation at L2-3 causing lateral recess stenosis and nerve root recompression.    Assessment and Plan:    Sharath Weiss is a 58 y.o. year old male who presents to the spine clinic with 5 months of increasing back pain, groin pain, and radiating pain into anterior thigh in the L2-3 distribution.     I have personally reviewed and interpreted the imaging and detailed diagnosis with the patient, discussed the natural history of their disease and both non-operative and operative treatments available and rationale and the risks for all options.    The patient’s clinical symptoms correlates well with the radiological findings. Patient has been having significant functional impairment with decreased ability to perform her normal activities of daily living. They have tried treatment options including medications (NSAIDs/narcotics/muscle relaxants/membrane stabilizers), formal physical therapy, and injections.    I offered the option of surgery that would consist of a right sided L2-3 microdiscectomy.    I have explained the surgical procedure in detail with expected duration and extent of recovery along risks of surgery that include, but is not limited to bleeding, infection, blood vessel injury or damage, loss of sensation, loss of bladder, bowel or sexual function, nerve injury/damage resulting in  weakness/paralysis, malunion, nonunion, CSF leak, brachial plexus injury, peripheral vision blindness, failure of implants/fusion, failure to relieve symptoms, recurrent disease, adjacent segment disease, need to reoperate for any reason and general anesthesia reaction such as stroke, coma, heart attack, delirium, confusion, death as well as worsening of preexisted medical conditions.    I clearly emphasized that while the goal of surgery is to decompress the spinal cord so as to ARREST the progression of neurological deficits - preexisting deficits may or may not improve after surgery. We discussed that many patients do clinically improve in functional and neurological outcomes following decompression of the spinal elements in patients with lumbar degenerative disease or radiculopathy the extent of which is variable and depends on the severity of pain, numbness, tingling, or weakness. With improvement seen of those symptoms in that order. We did discuss the goal of surgery to alleviate pain first and foremost and hope for recovery of all neurologic function with time.    All questions were answered and the patient left satisfied with the surgical plan moving forward.    I have reviewed all prior documentation and reviewed the electronic medical record since admission. I have personally have reviewed all advanced imaging not just the reports and used my interpretation as documented as the relevant findings. I have reviewed the risks and benefits of all treatment recommendations listed in this note with the patient and family. I spent a total of 40 minutes in service to this patient's care during this date of service.      The above clinical summary has been dictated with voice recognition software. It has not been proofread for grammatical errors, typographical mistakes, or other semantic inconsistencies.    Thank you for visiting our office today. It was our pleasure to take part in your healthcare.     Do not  hesitate to call with any questions regarding your plan of care after leaving at [phone] M-F 8am-4pm.     To clinicians, thank you very much for this kind referral. It is a privilege to partner with you in the care of your patients. My office would be delighted to assist you with any further consultations or with questions regarding the plan of care outlined. Do not hesitate to call the office or contact me directly.       Sincerely,      Spencer Maradiaga MD  Director, [facility] [place]   of Neurosurgery, [facility] and [facility]  Complex Spine Fellowship Director  , Department of Neurological Surgery  [facility] [employer] [facility]    [facility]  [address] [place] [address]. [address]  [address]  [address] [place] [address]  [address]  [place] [address]    Phone: [phone]  Fax: [phone]        Scribe Attestation  By signing my name below, I, Pilar Mejia , Antonia   attest that this documentation has been prepared under the direction and in the presence of Spencer Maradiaga MD.

## 2023-11-27 ENCOUNTER — OFFICE VISIT (OUTPATIENT)
Dept: ORTHOPEDIC SURGERY | Facility: CLINIC | Age: 58
End: 2023-11-27
Payer: COMMERCIAL

## 2023-11-27 ENCOUNTER — PREP FOR PROCEDURE (OUTPATIENT)
Dept: ORTHOPEDIC SURGERY | Facility: CLINIC | Age: 58
End: 2023-11-27

## 2023-11-27 DIAGNOSIS — M48.062 NEUROGENIC CLAUDICATION DUE TO LUMBAR SPINAL STENOSIS: Primary | ICD-10-CM

## 2023-11-27 PROCEDURE — 99215 OFFICE O/P EST HI 40 MIN: CPT | Performed by: ORTHOPAEDIC SURGERY

## 2023-11-27 PROCEDURE — 1036F TOBACCO NON-USER: CPT | Performed by: ORTHOPAEDIC SURGERY

## 2023-11-27 RX ORDER — GABAPENTIN 300 MG/1
600 CAPSULE ORAL ONCE
Status: CANCELLED | OUTPATIENT
Start: 2023-11-27 | End: 2023-11-27

## 2023-11-27 RX ORDER — ACETAMINOPHEN 325 MG/1
975 TABLET ORAL ONCE
Status: CANCELLED | OUTPATIENT
Start: 2023-11-27 | End: 2023-11-27

## 2023-11-27 RX ORDER — SODIUM CHLORIDE, SODIUM LACTATE, POTASSIUM CHLORIDE, CALCIUM CHLORIDE 600; 310; 30; 20 MG/100ML; MG/100ML; MG/100ML; MG/100ML
100 INJECTION, SOLUTION INTRAVENOUS CONTINUOUS
Status: CANCELLED | OUTPATIENT
Start: 2023-11-27

## 2023-11-27 NOTE — PROGRESS NOTES
Patient is here today for second opinion regarding his lumbar spine.  He has a remote history of a Worker's Comp. injury to his lower back, this was treated with surgery by Dr. Franca Jeffries.  This ultimately required an in situ fusion at L4-5.    His biggest complaint has been progressively worsening neurogenic claudication.  It is difficult for him to stand and walk for any length of time.  After about 5 or 10 minutes he is quite miserable and has to stop and sit down.  More recently he has started to get radicular symptoms in his right groin and anterior thigh.  He saw Dr. Huang a while back who recommended substantial spine surgery, recently saw Dr. Maradiaga who recommended a microdiscectomy only.    He has done therapy recently at [facility] in [employer].                                          On exam he is well-appearing and in no distress.  He walks with a normal gait.  Strength and sensation are preserved in the lower extremities bilaterally.  Negative straight leg raise test.    I personally reviewed x-rays and MRI of his lumbar spine.  X-rays demonstrate well-healed fusion at L4-5.  Pelvic incidence 70 degrees, lumbar lordosis 50 degrees.  He has hyperlordotic at L4-5, also from L2-4 there is advanced disc space collapse with early degenerative scoliosis present.  MRI demonstrates moderate to severe central narrowing at L3-4, severe central narrowing at L2-3 with a superimposed right-sided disc herniation.    I had a long discussion with him and his wife today regarding surgical treatment.  Given the severe central stenosis at L2-3 and L3-4 with progressively worsening claudication that precedes his radicular pain, I do not think that a microdiscectomy at L2-3 is enough to treat his underlying complaint.  I think he would do well with a lateral lumbar fusion, this would allow us to address the stenosis from L2-4, as well as restore lordosis from L2-4.  Given the fact that he has a fusion at L4-5 with  degenerative scoliosis above this I think he would benefit from fusion surgery in addition to decompression alone.    We discussed the pros and cons of the lateral retroperitoneal approach, I explained that there is a less than 5% chance that he will have persistent stenosis that would require any surgical intervention.    He would like to proceed with surgery.    I discussed the risks of surgery including bleeding, infection, paralysis, muscle weakness, CSF leak, bowel or bladder dysfunction, incomplete resolution of pain or numbness, DVT/PE, heart attack, stroke, and other unforeseen medical and anesthesia complications.  I also explained that the typical success rates for operation such as this fall in the 80-85% range, and that there is a small chance that there will be no improvement, or even less commonly, worsening of the preoperative symptoms.  He verbalized understanding of the risks, benefits, and alternatives to surgical treatment.  The plan will be for L2-4 lateral lumbar fusion through a left-sided approach, with posterior instrumentation.  Surgery was scheduled for December 29 at [facility].      *This note was dictated using speech recognition software and was not corrected for spelling or grammatical errors*                                                                                                                                                                                                                                                                                                                                                                                                                                                                                                                                                                                                                                                                                                                                                                                                                                                                                                                    Past Medical History:   Diagnosis Date    Bilateral primary osteoarthritis of knee     Primary osteoarthritis of both knees    Pain in left hip 10/30/2017    Hip pain, acute, left    Personal history of nicotine dependence 07/21/2019    History of nicotine dependence    Personal history of other diseases of the digestive system 03/30/2016    History of esophageal reflux    Personal history of other diseases of the digestive system 12/28/2015    History of esophageal ulcer    Primary osteoarthritis, unspecified shoulder     Osteoarthritis of shoulder region    Pyogenic arthritis, unspecified (CMS/[facility]) 05/03/2018    Septic arthritis of knee    Pyogenic arthritis, unspecified (CMS/[facility])     Septic arthritis of knee    Sleep disorder, unspecified     Disturbance of sleep    Spondylosis without myelopathy or radiculopathy, site unspecified     Arthritis of low back    Tobacco abuse counseling 03/02/2017    Encounter for smoking cessation counseling         Current Outpatient Medications:     azelastine (Astelin) 137 mcg (0.1 %) nasal spray, Administer 1 spray into each nostril in the morning and 1 spray before bedtime. Use in each nostril as directed., Disp: 30 mL, Rfl: 2    ciclopirox 1 % shampoo, Wet hair and apply shampoo to scalp. Lather and leave on for 3 minutes. Rinse. Do this twice a week at night., Disp: 120 mL, Rfl: 11    cyclobenzaprine (Flexeril) 10 mg tablet, Take 1 tablet (10 mg) by mouth as needed at bedtime for muscle spasms., Disp: 60 tablet, Rfl: 2    famotidine (Pepcid) 20 mg tablet, Take 1 tablet (20 mg) by mouth in the morning and 1 tablet (20 mg) before bedtime. (Patient not taking: Reported on 11/20/2023), Disp: 180 tablet, Rfl: 2    hydroxychloroquine (PlaqueniL) 200 mg tablet, Take 1 tablet (200 mg) by mouth once  daily., Disp: 30 tablet, Rfl: 1    ibuprofen 400 mg tablet, Take 1 tablet (400 mg) by mouth every 6 hours if needed for mild pain (1 - 3)., Disp: , Rfl:     lido-diphen-Maalox 1:1:1 Magic Mouthwash, Swish and swallow 30 mL 3 times a day as needed (severe bdominal pain)., Disp: 300 mL, Rfl: 2    lisinopril 10 mg tablet, Take 1 tablet (10 mg) by mouth once daily., Disp: 90 tablet, Rfl: 3    rosuvastatin (Crestor) 10 mg tablet, Take 1 tablet (10 mg) by mouth once daily., Disp: 90 tablet, Rfl: 2    tacrolimus (Protopic) 0.1 % ointment, in the morning and at bedtime. APPLY AND GENTLY MASSAGE INTO AFFECTED AREA(S), Disp: , Rfl:     zolpidem (Ambien) 10 mg tablet, Take 1 tablet (10 mg) by mouth as needed at bedtime for sleep., Disp: 90 tablet, Rfl: 1     Social History     Socioeconomic History    Marital status:      Spouse name: Not on file    Number of children: Not on file    Years of education: Not on file    Highest education level: Not on file   Occupational History    Not on file   Tobacco Use    Smoking status: Former     Types: Cigarettes     Quit date: 2018     Years since quittin.9    Smokeless tobacco: Never   Substance and Sexual Activity    Alcohol use: Yes    Drug use: Never    Sexual activity: Not on file   Other Topics Concern    Not on file   Social History Narrative    Not on file     Social Determinants of Health     Financial Resource Strain: Not on file   Food Insecurity: Not on file   Transportation Needs: Not on file   Physical Activity: Not on file   Stress: Not on file   Social Connections: Not on file   Intimate Partner Violence: Not on file   Housing Stability: Not on file       Nasim Quiros MD  Director, [facility]   Department of Orthopaedic Surgery  [facility]  [address]  [place] [id number]  [phone]

## 2023-11-28 NOTE — PROGRESS NOTES
Subjective   HPI: Sharath Weiss is a 58 y.o. male is here for 1 month follow up after starting Plaquenil 200 mg for discoid lupus of the scalp.  Patient has not experiencing any side effects.  Patient did have a scare earlier this week as he woke up with red eyes and went to the eye doctor.  Eye doctor discussed with patient that this is likely dry eye and not related to Plaquenil use.    ROS: No other skin or systemic complaints other than what is documented elsewhere in the note.    ALLERGIES: Vancomycin    SOCIAL:  reports that he quit smoking about 5 years ago. His smoking use included cigarettes. He has never used smokeless tobacco. He reports current alcohol use. He reports that he does not use drugs.    Objective   Scalp  Symmetric erythematous patches overlying greasy scales.  This has improved since last visit with minimal scaling present    Scalp  Numerous patches of hair loss that appear less inflamed than last time.  There appears to be some hair growth and previous injected areas.                Assessment/Plan   1. Seborrheic dermatitis  Scalp    Continue:  Ciclopirox shampoo    Related Medications  ciclopirox 1 % shampoo  Wet hair and apply shampoo to scalp. Lather and leave on for 3 minutes. Rinse. Do this twice a week at night.    2. Discoid lupus erythematosus  Scalp    Continue:  Plaquenil 200 mg p.o. daily    The patient scalp appears less inflamed this visit compared to prior visits.  I discussed with patient that the redness of the eyes that he experienced likely was not due to the Plaquenil.  Patient is not experiencing any side effects from Plaquenil so we will continue at the current dose.  Discussed with patient that we will stay on this medication through the fall of next year and winter of next year we can try going off of the medication.  Discussed with patient that for people with discoid lupus we usually have them off during the wintertime but since his diagnosis was made this past  fall I would like to continue the medication.  Patient is agreeable.    Patient received intralesional injections last visit.  And some of the areas of hair loss there was a little bit of hair regrowth.  Patient would like to try intralesional injections again.    Patient is having a spinal fusion in late December.  Discussed with patient that on dermatology side he is okay to continue on the Plaquenil.  Discussed with patient to let his surgeon know that he is on Plaquenil.  I do not foresee this being an issue.  If it is patient can listen to his surgeons requirements.    Related Medications  hydroxychloroquine (PlaqueniL) 200 mg tablet  Take 1 tablet (200 mg) by mouth once daily.         FOLLOW UP: 1 month for intralesional, 3 months for discoid lupus follow-up    The patient was encouraged to contact me with any further questions or concerns.  Hannah Holman PA-C  12/3/2023

## 2023-11-30 ENCOUNTER — OFFICE VISIT (OUTPATIENT)
Dept: DERMATOLOGY | Facility: CLINIC | Age: 58
End: 2023-11-30
Payer: COMMERCIAL

## 2023-11-30 DIAGNOSIS — L93.0 DISCOID LUPUS ERYTHEMATOSUS: ICD-10-CM

## 2023-11-30 DIAGNOSIS — L21.9 SEBORRHEIC DERMATITIS: Primary | ICD-10-CM

## 2023-11-30 PROCEDURE — 1036F TOBACCO NON-USER: CPT

## 2023-11-30 PROCEDURE — 11900 INJECT SKIN LESIONS </W 7: CPT

## 2023-11-30 PROCEDURE — 99213 OFFICE O/P EST LOW 20 MIN: CPT

## 2023-11-30 ASSESSMENT — DERMATOLOGY QUALITY OF LIFE (QOL) ASSESSMENT
DATE THE QUALITY-OF-LIFE ASSESSMENT WAS COMPLETED: 66808
WHAT SINGLE SKIN CONDITION LISTED BELOW IS THE PATIENT ANSWERING THE QUALITY-OF-LIFE ASSESSMENT QUESTIONS ABOUT: NONE OF THE ABOVE
WHAT SINGLE SKIN CONDITION LISTED BELOW IS THE PATIENT ANSWERING THE QUALITY-OF-LIFE ASSESSMENT QUESTIONS ABOUT: NONE OF THE ABOVE
RATE HOW BOTHERED YOU ARE BY SYMPTOMS OF YOUR SKIN PROBLEM (EG, ITCHING, STINGING BURNING, HURTING OR SKIN IRRITATION): 6 - ALWAYS BOTHERED
RATE HOW BOTHERED YOU ARE BY EFFECTS OF YOUR SKIN PROBLEMS ON YOUR ACTIVITIES (EG, GOING OUT, ACCOMPLISHING WHAT YOU WANT, WORK ACTIVITIES OR YOUR RELATIONSHIPS WITH OTHERS): 0 - NEVER BOTHERED
RATE HOW BOTHERED YOU ARE BY EFFECTS OF YOUR SKIN PROBLEMS ON YOUR ACTIVITIES (EG, GOING OUT, ACCOMPLISHING WHAT YOU WANT, WORK ACTIVITIES OR YOUR RELATIONSHIPS WITH OTHERS): 0 - NEVER BOTHERED
RATE HOW EMOTIONALLY BOTHERED YOU ARE BY YOUR SKIN PROBLEM (FOR EXAMPLE, WORRY, EMBARRASSMENT, FRUSTRATION): 4
RATE HOW BOTHERED YOU ARE BY SYMPTOMS OF YOUR SKIN PROBLEM (EG, ITCHING, STINGING BURNING, HURTING OR SKIN IRRITATION): 6 - ALWAYS BOTHERED
RATE HOW EMOTIONALLY BOTHERED YOU ARE BY YOUR SKIN PROBLEM (FOR EXAMPLE, WORRY, EMBARRASSMENT, FRUSTRATION): 4

## 2023-11-30 NOTE — PROGRESS NOTES
Subjective     Sharath Weiss is a 58 y.o. male who presents for the following: Discoid lupus (Taken Plaquenil 200 mg every day ).     Review of Systems:  No other skin or systemic complaints other than what is documented elsewhere in the note.    The following portions of the chart were reviewed this encounter and updated as appropriate:          Skin Cancer History  No skin cancer on file.      Specialty Problems          Dermatology Problems    Lichen planus    Yeast dermatitis of penis    Rash and other nonspecific skin eruption    Sebaceous cyst    Tinea unguium        Objective   Well appearing patient in no apparent distress; mood and affect are within normal limits.    A focused skin examination was performed. All findings within normal limits unless otherwise noted below.    Assessment/Plan   1. Discoid lupus erythematosus    Related Medications  hydroxychloroquine (PlaqueniL) 200 mg tablet  Take 1 tablet (200 mg) by mouth once daily.

## 2023-12-01 RX ORDER — HYDROXYCHLOROQUINE SULFATE 200 MG/1
200 TABLET, FILM COATED ORAL DAILY
Qty: 30 TABLET | Refills: 2 | Status: SHIPPED | OUTPATIENT
Start: 2023-12-01 | End: 2024-01-29 | Stop reason: SDUPTHER

## 2023-12-04 ENCOUNTER — APPOINTMENT (OUTPATIENT)
Dept: ORTHOPEDIC SURGERY | Facility: CLINIC | Age: 58
End: 2023-12-04
Payer: COMMERCIAL

## 2023-12-20 ENCOUNTER — TELEMEDICINE CLINICAL SUPPORT (OUTPATIENT)
Dept: PREADMISSION TESTING | Facility: HOSPITAL | Age: 58
End: 2023-12-20
Payer: COMMERCIAL

## 2023-12-20 RX ORDER — KETOCONAZOLE 20 MG/ML
1 SHAMPOO, SUSPENSION TOPICAL 2 TIMES WEEKLY
COMMUNITY

## 2023-12-20 RX ORDER — PANTOPRAZOLE SODIUM 20 MG/1
20 TABLET, DELAYED RELEASE ORAL DAILY
COMMUNITY
End: 2024-02-20 | Stop reason: WASHOUT

## 2023-12-21 ENCOUNTER — LAB (OUTPATIENT)
Dept: LAB | Facility: LAB | Age: 58
End: 2023-12-21
Payer: COMMERCIAL

## 2023-12-21 ENCOUNTER — OFFICE VISIT (OUTPATIENT)
Dept: PRIMARY CARE | Facility: CLINIC | Age: 58
End: 2023-12-21
Payer: COMMERCIAL

## 2023-12-21 VITALS
WEIGHT: 196 LBS | TEMPERATURE: 98.7 F | BODY MASS INDEX: 27.34 KG/M2 | SYSTOLIC BLOOD PRESSURE: 154 MMHG | DIASTOLIC BLOOD PRESSURE: 85 MMHG

## 2023-12-21 DIAGNOSIS — M25.562 CHRONIC PAIN OF LEFT KNEE: Primary | ICD-10-CM

## 2023-12-21 DIAGNOSIS — M25.562 CHRONIC PAIN OF LEFT KNEE: ICD-10-CM

## 2023-12-21 DIAGNOSIS — G89.29 CHRONIC PAIN OF LEFT KNEE: Primary | ICD-10-CM

## 2023-12-21 DIAGNOSIS — G89.29 CHRONIC PAIN OF LEFT KNEE: ICD-10-CM

## 2023-12-21 LAB
BASOPHILS # BLD AUTO: 0.06 X10*3/UL (ref 0–0.1)
BASOPHILS NFR BLD AUTO: 0.6 %
CRP SERPL-MCNC: <0.1 MG/DL
EOSINOPHIL # BLD AUTO: 0.02 X10*3/UL (ref 0–0.7)
EOSINOPHIL NFR BLD AUTO: 0.2 %
ERYTHROCYTE [DISTWIDTH] IN BLOOD BY AUTOMATED COUNT: 11.9 % (ref 11.5–14.5)
HCT VFR BLD AUTO: 41.9 % (ref 41–52)
HGB BLD-MCNC: 13.9 G/DL (ref 13.5–17.5)
IMM GRANULOCYTES # BLD AUTO: 0.03 X10*3/UL (ref 0–0.7)
IMM GRANULOCYTES NFR BLD AUTO: 0.3 % (ref 0–0.9)
LYMPHOCYTES # BLD AUTO: 1.36 X10*3/UL (ref 1.2–4.8)
LYMPHOCYTES NFR BLD AUTO: 14.3 %
MCH RBC QN AUTO: 30.8 PG (ref 26–34)
MCHC RBC AUTO-ENTMCNC: 33.2 G/DL (ref 32–36)
MCV RBC AUTO: 93 FL (ref 80–100)
MONOCYTES # BLD AUTO: 0.62 X10*3/UL (ref 0.1–1)
MONOCYTES NFR BLD AUTO: 6.5 %
NEUTROPHILS # BLD AUTO: 7.4 X10*3/UL (ref 1.2–7.7)
NEUTROPHILS NFR BLD AUTO: 78.1 %
NRBC BLD-RTO: 0 /100 WBCS (ref 0–0)
PLATELET # BLD AUTO: 259 X10*3/UL (ref 150–450)
RBC # BLD AUTO: 4.52 X10*6/UL (ref 4.5–5.9)
WBC # BLD AUTO: 9.5 X10*3/UL (ref 4.4–11.3)

## 2023-12-21 PROCEDURE — 3079F DIAST BP 80-89 MM HG: CPT | Performed by: FAMILY MEDICINE

## 2023-12-21 PROCEDURE — 99213 OFFICE O/P EST LOW 20 MIN: CPT | Performed by: FAMILY MEDICINE

## 2023-12-21 PROCEDURE — 86140 C-REACTIVE PROTEIN: CPT

## 2023-12-21 PROCEDURE — 85025 COMPLETE CBC W/AUTO DIFF WBC: CPT

## 2023-12-21 PROCEDURE — 3077F SYST BP >= 140 MM HG: CPT | Performed by: FAMILY MEDICINE

## 2023-12-21 PROCEDURE — 36415 COLL VENOUS BLD VENIPUNCTURE: CPT

## 2023-12-21 PROCEDURE — 1036F TOBACCO NON-USER: CPT | Performed by: FAMILY MEDICINE

## 2023-12-21 NOTE — PROGRESS NOTES
Subjective   Patient ID: [id number]     Sharath Weiss is a 58 y.o. male who presents for Knee Pain.    HPI  Sharath states that he has had four left knee operations and his left sided artificial knee got infected well after his last knee surgery with sepsis approximately 2023.  Sharath calls today because of his concern over upcoming spinal fusion with Dr Nasim Quiros and due to pain in his left knee that started four weeks ago and he would like a CRP ordered.  Message was left for the surgeon to call but since the surgeon didn't call back, he presents here for an opinion and would like a CRP drawn.  Review of Systems    ID-no fever   MSK-No locking, giving way/swelling of joints  NEURO- No headaches, hx of concussion, falls in the last year or seizure  DERM-No rashes  Objective     /85 (BP Location: Right arm, Patient Position: Sitting)   Temp 37.1 °C (98.7 °F) (Oral)   Wt 88.9 kg (196 lb)   BMI 27.34 kg/m²      Physical Exam  Left Leg-no swelling or pain in thigh or calf  Heart- regular rate and rhythm, normal s1 and s2 without murmur or gallop  Knee exam- Full range of motion;  multiple scars on knee with lateral knee defect;  no varus or valgus deviation; negative anterior and posterior drawer sign;  negative Lachman's and Tessa's signs;  negative patellar trap test;  no effusion and no pain      Assessment/Plan     Problem List Items Addressed This Visit       Chronic pain of left knee - Primary    Relevant Orders    CBC and Auto Differential    C-reactive protein     We will call you with your results.    Buster Rodriguez MD

## 2023-12-26 ENCOUNTER — LAB (OUTPATIENT)
Dept: LAB | Facility: LAB | Age: 58
End: 2023-12-26
Payer: COMMERCIAL

## 2023-12-26 ENCOUNTER — PRE-ADMISSION TESTING (OUTPATIENT)
Dept: PREADMISSION TESTING | Facility: HOSPITAL | Age: 58
End: 2023-12-26
Payer: COMMERCIAL

## 2023-12-26 VITALS
RESPIRATION RATE: 18 BRPM | DIASTOLIC BLOOD PRESSURE: 75 MMHG | HEIGHT: 71 IN | HEART RATE: 91 BPM | BODY MASS INDEX: 26.85 KG/M2 | SYSTOLIC BLOOD PRESSURE: 134 MMHG | TEMPERATURE: 97.9 F | OXYGEN SATURATION: 97 % | WEIGHT: 191.8 LBS

## 2023-12-26 DIAGNOSIS — Z01.818 PREOP EXAMINATION: Primary | ICD-10-CM

## 2023-12-26 DIAGNOSIS — M48.062 NEUROGENIC CLAUDICATION DUE TO LUMBAR SPINAL STENOSIS: ICD-10-CM

## 2023-12-26 LAB
ABO GROUP (TYPE) IN BLOOD: NORMAL
ANION GAP SERPL CALC-SCNC: 13 MMOL/L (ref 10–20)
ANTIBODY SCREEN: NORMAL
APTT PPP: 30 SECONDS (ref 27–38)
BASOPHILS # BLD AUTO: 0.06 X10*3/UL (ref 0–0.1)
BASOPHILS NFR BLD AUTO: 0.9 %
BUN SERPL-MCNC: 12 MG/DL (ref 6–23)
CALCIUM SERPL-MCNC: 9.6 MG/DL (ref 8.6–10.3)
CHLORIDE SERPL-SCNC: 103 MMOL/L (ref 98–107)
CO2 SERPL-SCNC: 27 MMOL/L (ref 21–32)
CREAT SERPL-MCNC: 0.79 MG/DL (ref 0.5–1.3)
EOSINOPHIL # BLD AUTO: 0.07 X10*3/UL (ref 0–0.7)
EOSINOPHIL NFR BLD AUTO: 1 %
ERYTHROCYTE [DISTWIDTH] IN BLOOD BY AUTOMATED COUNT: 11.9 % (ref 11.5–14.5)
GFR SERPL CREATININE-BSD FRML MDRD: >90 ML/MIN/1.73M*2
GLUCOSE SERPL-MCNC: 95 MG/DL (ref 74–99)
HCT VFR BLD AUTO: 41.1 % (ref 41–52)
HGB BLD-MCNC: 13.7 G/DL (ref 13.5–17.5)
IMM GRANULOCYTES # BLD AUTO: 0.03 X10*3/UL (ref 0–0.7)
IMM GRANULOCYTES NFR BLD AUTO: 0.4 % (ref 0–0.9)
INR PPP: 1 (ref 0.9–1.1)
LYMPHOCYTES # BLD AUTO: 1.79 X10*3/UL (ref 1.2–4.8)
LYMPHOCYTES NFR BLD AUTO: 26.3 %
MCH RBC QN AUTO: 30.4 PG (ref 26–34)
MCHC RBC AUTO-ENTMCNC: 33.3 G/DL (ref 32–36)
MCV RBC AUTO: 91 FL (ref 80–100)
MONOCYTES # BLD AUTO: 0.54 X10*3/UL (ref 0.1–1)
MONOCYTES NFR BLD AUTO: 7.9 %
NEUTROPHILS # BLD AUTO: 4.32 X10*3/UL (ref 1.2–7.7)
NEUTROPHILS NFR BLD AUTO: 63.5 %
NRBC BLD-RTO: 0 /100 WBCS (ref 0–0)
PLATELET # BLD AUTO: 255 X10*3/UL (ref 150–450)
POTASSIUM SERPL-SCNC: 3.9 MMOL/L (ref 3.5–5.3)
PROTHROMBIN TIME: 10.8 SECONDS (ref 9.8–12.8)
RBC # BLD AUTO: 4.51 X10*6/UL (ref 4.5–5.9)
RH FACTOR (ANTIGEN D): NORMAL
SODIUM SERPL-SCNC: 139 MMOL/L (ref 136–145)
WBC # BLD AUTO: 6.8 X10*3/UL (ref 4.4–11.3)

## 2023-12-26 PROCEDURE — 93005 ELECTROCARDIOGRAM TRACING: CPT | Performed by: NURSE PRACTITIONER

## 2023-12-26 PROCEDURE — 86900 BLOOD TYPING SEROLOGIC ABO: CPT

## 2023-12-26 PROCEDURE — 99203 OFFICE O/P NEW LOW 30 MIN: CPT | Performed by: NURSE PRACTITIONER

## 2023-12-26 PROCEDURE — 85610 PROTHROMBIN TIME: CPT

## 2023-12-26 PROCEDURE — 80048 BASIC METABOLIC PNL TOTAL CA: CPT

## 2023-12-26 PROCEDURE — 86850 RBC ANTIBODY SCREEN: CPT

## 2023-12-26 PROCEDURE — 87081 CULTURE SCREEN ONLY: CPT | Mod: AHULAB | Performed by: NURSE PRACTITIONER

## 2023-12-26 PROCEDURE — 36415 COLL VENOUS BLD VENIPUNCTURE: CPT

## 2023-12-26 PROCEDURE — 85730 THROMBOPLASTIN TIME PARTIAL: CPT

## 2023-12-26 PROCEDURE — 85025 COMPLETE CBC W/AUTO DIFF WBC: CPT

## 2023-12-26 PROCEDURE — 86901 BLOOD TYPING SEROLOGIC RH(D): CPT

## 2023-12-26 RX ORDER — ACETAMINOPHEN 500 MG
500 TABLET ORAL EVERY 6 HOURS PRN
COMMUNITY
End: 2023-12-30 | Stop reason: HOSPADM

## 2023-12-26 RX ORDER — FLUTICASONE FUROATE 27.5 UG/1
1 SPRAY, METERED NASAL DAILY PRN
COMMUNITY

## 2023-12-26 RX ORDER — CHLORHEXIDINE GLUCONATE ORAL RINSE 1.2 MG/ML
15 SOLUTION DENTAL 2 TIMES DAILY
Qty: 473 ML | Refills: 0 | Status: SHIPPED
Start: 2023-12-26 | End: 2023-12-30 | Stop reason: HOSPADM

## 2023-12-26 ASSESSMENT — ENCOUNTER SYMPTOMS
GASTROINTESTINAL NEGATIVE: 1
NECK NEGATIVE: 1
NEUROLOGICAL NEGATIVE: 1
EYES NEGATIVE: 1
RESPIRATORY NEGATIVE: 1
ENDOCRINE NEGATIVE: 1
CONSTITUTIONAL NEGATIVE: 1

## 2023-12-26 NOTE — PREPROCEDURE INSTRUCTIONS
Medication List            Accurate as of December 26, 2023 12:18 PM. Always use your most recent med list.                acetaminophen 500 mg tablet  Commonly known as: Tylenol     azelastine 137 mcg (0.1 %) nasal spray  Commonly known as: Astelin  Administer 1 spray into each nostril in the morning and 1 spray before bedtime. Use in each nostril as directed.  Medication Adjustments for Surgery: Take morning of surgery with sip of water, no other fluids     ciclopirox 1 % shampoo  Wet hair and apply shampoo to scalp. Lather and leave on for 3 minutes. Rinse. Do this twice a week at night.  Medication Adjustments for Surgery: Continue until night before surgery     cyclobenzaprine 10 mg tablet  Commonly known as: Flexeril  Take 1 tablet (10 mg) by mouth as needed at bedtime for muscle spasms.  Medication Adjustments for Surgery: Continue until night before surgery     Flonase Sensimist 27.5 mcg/actuation nasal spray  Generic drug: fluticasone     hydroxychloroquine 200 mg tablet  Commonly known as: PlaqueniL  Take 1 tablet (200 mg) by mouth once daily.  Medication Adjustments for Surgery: Take morning of surgery with sip of water, no other fluids     ibuprofen 400 mg tablet  Medication Adjustments for Surgery: Other (Comment)  Notes to patient: Stop now     ketoconazole 2 % shampoo  Commonly known as: NIZOral  Medication Adjustments for Surgery: Continue until night before surgery     lidocaine-diphenhydrAMINE-Maalox 1:1:1 574--40 mg/30 mL liquid mouthwash  Commonly known as: Magic Mouthwash  Swish and swallow 30 mL 3 times a day as needed (severe bdominal pain).  Medication Adjustments for Surgery: Continue until night before surgery     lisinopril 10 mg tablet  Take 1 tablet (10 mg) by mouth once daily.  Medication Adjustments for Surgery: Continue until night before surgery     pantoprazole 20 mg EC tablet  Commonly known as: ProtoNix  Medication Adjustments for Surgery: Take morning of surgery with sip  of water, no other fluids     rosuvastatin 10 mg tablet  Commonly known as: Crestor  Take 1 tablet (10 mg) by mouth once daily.  Medication Adjustments for Surgery: Take morning of surgery with sip of water, no other fluids     tacrolimus 0.1 % ointment  Commonly known as: Protopic  Medication Adjustments for Surgery: Continue until night before surgery     zolpidem 10 mg tablet  Commonly known as: Ambien  Take 1 tablet (10 mg) by mouth as needed at bedtime for sleep.  Medication Adjustments for Surgery: Continue until night before surgery            CONTACT SURGEON'S OFFICE IF YOU DEVELOP:  * Fever = 100.4 F   * New respiratory symptoms (e.g. cough, shortness of breath, respiratory distress, sore throat)  * Recent loss of taste or smell  *Flu like symptoms such as headache, fatigue or gastrointestinal symptoms  * You develop any open sores, shingles, burning or painful urination   AND/OR:  * You no longer wish to have the surgery.  * Any other personal circumstances change that may lead to the need to cancel or defer this surgery.  *You were admitted to any hospital within one week of your planned procedure.    SMOKING:  *Quitting smoking can make a huge difference to your health and recovery from surgery.    *If you need help with quitting, call 0-812-QUIT-NOW.    THE DAY BEFORE SURGERY:  *Do not eat any food after midnight the night before surgery.   *You are permitted to drink clear liquids (i.e. water, black coffee, tea, clear broth, apple juice) up to 2 hours before your surgery.    DIABETICS:  If diabetic, nothing by mouth (no solids or liquids) for 8 hours prior to surgery.   Please check fasting blood sugar upon waking up.  If fasting sugar is <80 mg/dl, please drink 100ml/3oz of apple juice no later than 2 hours prior to surgery.      SURGICAL TIME  *You will be contacted between 2 p.m. and 6 p.m. the business day before your surgery with your arrival time.  *If you haven't received a call by 6pm, call  [phone].  *Scheduled surgery times may change and you will be notified if this occurs-check your personal voicemail for any updates.    ON THE MORNING OF SURGERY:  *Wear comfortable, loose fitting clothing.   *Do not use moisturizers, creams, lotions or perfume.  *All jewelry and valuables should be left at home.  *Prosthetic devices such as contact lenses, hearing aids, dentures, eyelash extensions, hairpins and body piercing must be removed before surgery.    BRING WITH YOU:  *Photo ID and insurance card  *Current list of medicines and allergies  *Pacemaker/Defibrillator/Heart stent cards  *CPAP machine and mask  *Slings/splints/crutches  *Copy of your complete Advanced Directive/DHPOA-if applicable  *Neurostimulator implant remote    PARKING AND ARRIVAL:  *Check in at the Main Entrance desk and let them know you are here for surgery.  *You will be directed to the 2nd floor surgical waiting area.    AFTER OUTPATIENT SURGERY:  *A responsible adult MUST accompany you at the time of discharge and stay with you for 24 hours after your surgery.  *You may NOT drive yourself home after surgery.  *You may use a taxi or ride sharing service ([employer], Uber) to return home ONLY if you are accompanied by a friend or family member.  *Instructions for resuming your medications will be provided by your surgeon.

## 2023-12-26 NOTE — H&P (VIEW-ONLY)
[facility]/PAT Evaluation       Name: Sharath Weiss (Sharath Weiss)  /Age: 1965/58 y.o.     In-Person           HPI        Date of Consult:  23    Referring Provider: Dr Quiros      L2-4 Lateral lumbar fusion with posterior instrumentation, 23    Sharath Weiss is a 58 year-old male who presents to the [facility] for perioperative risk assessment prior to surgery.    Patient presents with a primary diagnosis of back pain.   He has a remote history of a Worker's Comp. injury to his lower back, this was treated with surgery by Dr. Franca Jeffries.  This ultimately required an in situ fusion at L4-5.     His biggest complaint has been progressively worsening neurogenic claudication.  It is difficult for him to stand and walk for any length of time.  After about 5 or 10 minutes he is quite miserable and has to stop and sit down.  More recently he has started to get radicular symptoms in his right groin and anterior thigh.  He saw Dr. Huang a while back who recommended substantial spine surgery, recently saw Dr. Maradiaga who recommended a microdiscectomy only.     He has done therapy recently at [facility] in [place].                                          This note was created in part upon personal review of patient's medical records.      Patient is scheduled to have L2-4 Lateral lumbar fusion with posterior instrumentation,      Pt denies any past history of anesthetic complications such as PONV, awareness, prolonged sedation, dental damage, aspiration, cardiac arrest, difficult intubation, difficult I.V. access or unexpected hospital admissions.  NO malignant hyperthermia and or pseudocholinesterase deficiency.  No history of blood transfusions     STOP BANG 3    The patient is not a Rastafarian and will accept blood and blood products if medically indicated.   Type and screen sent.     Past Medical History:   Diagnosis Date    BPH (benign prostatic hyperplasia)     Cervical disc disease      Discoid lupus erythematosus     Diverticulitis     s/p bowel resection     History of sepsis      - left knee    Hyperlipidemia     Hypertension     Insomnia     Lumbar stenosis     Osteoarthritis     Spinal stenosis     Tinnitus     Trigger finger of thumb     Vitamin D deficiency        Past Surgical History:   Procedure Laterality Date    ABSCESS DRAINAGE      abdominal    ANTERIOR CRUCIATE LIGAMENT REPAIR  2015    Primary Repair Of Knee Ligament Cruciate Anterior    BOWEL RESECTION      COLONOSCOPY  2022    Complete Colonoscopy    ELBOW FRACTURE SURGERY Left     KNEE ARTHROSCOPY W/ DEBRIDEMENT  2015    Knee Arthroscopy (Therapeutic)    KNEE ARTHROSCOPY W/ HARDWARE REMOVAL Left     2015    LUMBAR FUSION  2015    Lumbar Vertebral Fusion    LUMBAR LAMINECTOMY  2015    Laminectomy Lumbar    OTHER SURGICAL HISTORY  2020    Epidural steroid injection    OTHER SURGICAL HISTORY  2018    Arthroscopy Shoulder Right x 2    SHOULDER ARTHROSCOPY Left     x 3    UPPER GASTROINTESTINAL ENDOSCOPY         Social History     Socioeconomic History    Marital status:      Spouse name: Not on file    Number of children: Not on file    Years of education: Not on file    Highest education level: Not on file   Occupational History    Not on file   Tobacco Use    Smoking status: Former     Types: Cigarettes     Quit date:      Years since quittin.9    Smokeless tobacco: Never   Vaping Use    Vaping Use: Every day   Substance and Sexual Activity    Alcohol use: Yes     Alcohol/week: 12.0 standard drinks of alcohol     Types: 12 Cans of beer per week    Drug use: Never    Sexual activity: Not on file   Other Topics Concern    Not on file   Social History Narrative    Not on file     Social Determinants of Health     Financial Resource Strain: Not on file   Food Insecurity: Not on file   Transportation Needs: Not on file   Physical Activity: Not on file   Stress: Not on  file   Social Connections: Not on file   Intimate Partner Violence: Not on file   Housing Stability: Not on file        Family History   Problem Relation Name Age of Onset    Breast cancer Mother      Hypertension Father      Transient ischemic attack Father      Irritable bowel syndrome Sister         Allergies   Allergen Reactions    Vancomycin Nausea/vomiting, Anxiety and Tinnitus     Other reaction(s): Intolerance   Tinnitus and shaking          Current Outpatient Medications:     acetaminophen (Tylenol) 500 mg tablet, Take 1 tablet (500 mg) by mouth every 6 hours if needed for mild pain (1 - 3)., Disp: , Rfl:     ciclopirox 1 % shampoo, Wet hair and apply shampoo to scalp. Lather and leave on for 3 minutes. Rinse. Do this twice a week at night., Disp: 120 mL, Rfl: 11    cyclobenzaprine (Flexeril) 10 mg tablet, Take 1 tablet (10 mg) by mouth as needed at bedtime for muscle spasms., Disp: 60 tablet, Rfl: 2    fluticasone (Flonase Sensimist) 27.5 mcg/actuation nasal spray, Administer 1 spray into each nostril once daily as needed for rhinitis., Disp: , Rfl:     hydroxychloroquine (PlaqueniL) 200 mg tablet, Take 1 tablet (200 mg) by mouth once daily., Disp: 30 tablet, Rfl: 2    ibuprofen 400 mg tablet, Take 1 tablet (400 mg) by mouth every 6 hours if needed for mild pain (1 - 3)., Disp: , Rfl:     ketoconazole (NIZOral) 2 % shampoo, Apply 1 Application topically 2 times a week., Disp: , Rfl:     lido-diphen-Maalox 1:1:1 Magic Mouthwash, Swish and swallow 30 mL 3 times a day as needed (severe bdominal pain)., Disp: 300 mL, Rfl: 2    lisinopril 10 mg tablet, Take 1 tablet (10 mg) by mouth once daily., Disp: 90 tablet, Rfl: 3    pantoprazole (ProtoNix) 20 mg EC tablet, Take 1 tablet (20 mg) by mouth once daily. Do not crush, chew, or split., Disp: , Rfl:     rosuvastatin (Crestor) 10 mg tablet, Take 1 tablet (10 mg) by mouth once daily., Disp: 90 tablet, Rfl: 2    tacrolimus (Protopic) 0.1 % ointment, in the morning  "and at bedtime. APPLY AND GENTLY MASSAGE INTO AFFECTED AREA(S), Disp: , Rfl:     zolpidem (Ambien) 10 mg tablet, Take 1 tablet (10 mg) by mouth as needed at bedtime for sleep. (Patient taking differently: Take 1 tablet (10 mg) by mouth once daily at bedtime.), Disp: 90 tablet, Rfl: 1    azelastine (Astelin) 137 mcg (0.1 %) nasal spray, Administer 1 spray into each nostril in the morning and 1 spray before bedtime. Use in each nostril as directed., Disp: 30 mL, Rfl: 2     PAT ROS:   Constitutional:   neg    Neuro/Psych:   neg    Eyes:    WEARS GLASSES  neg    Ears:   neg    Nose:   neg    Mouth:   neg    Throat:   neg    Neck:   neg    Cardio:    FUNCTIONAL 4 METS. DENIES SOB WALKING UP 2 FLIGHTS OF STAIRS  Respiratory:   neg    Endocrine:   neg    GI:   neg    :   neg    Musculoskeletal:    BACK PAIN  Hematologic:   neg    Skin:  neg        Physical Exam  Vitals reviewed. Physical exam within normal limits.          PAT AIRWAY:   Airway:     Mallampati::  IV    Neck ROM::  Full  normal          Heart Rate:  [91]   Temp:  [36.6 °C (97.9 °F)]   Resp:  [18]   BP: (134)/(75)   Height:  [180.3 cm (5' 11\")]   Weight:  [87 kg (191 lb 12.8 oz)]   SpO2:  [97 %]      EKG in 12/26/23:  NSR  Normal EKG  HR 88 bpm    Lab Results   Component Value Date    WBC 9.5 12/21/2023    HGB 13.9 12/21/2023    HCT 41.9 12/21/2023    MCV 93 12/21/2023     12/21/2023      Lab Results   Component Value Date    GLUCOSE 95 12/26/2023    CALCIUM 9.6 12/26/2023     12/26/2023    K 3.9 12/26/2023    CO2 27 12/26/2023     12/26/2023    BUN 12 12/26/2023    CREATININE 0.79 12/26/2023      Lab Results   Component Value Date    INR 1.0 12/26/2023    PROTIME 10.8 12/26/2023      Assessment and Plan:         Patient is a 58 year-old male scheduled for a with Dr. Quiros on 12/29/23.  Patient has no active cardiac symptoms.   Patient denies any chest pain, tightness, heaviness, pressure, radiating pain, palpitations, irregular " heartbeats, lightheadedness, cough, congestion, shortness of breath, BLUNT, PND, near syncope, weight loss or gain.     RCRI  1  , 6 % Risk of MACE    Cardiology:  -- Controlled HTN: CBC, BMP and EKG ordered    Hematology:  Patient instructed to ambulate as soon as possible postoperatively to decrease thromboembolic risk.   Initiate mechanical DVT prophylaxis as soon as possible and initiate chemical prophylaxis when deemed safe from a bleeding standpoint post surgery.     STOP BANG: 3    Caprini: 5    Risk assessment complete.  Patient is scheduled for a low surgical risk procedure.        Preoperative medication instructions were provided and reviewed with the patient.  Any additional testing or evaluation was explained to the patient.  Nothing by mouth instructions were discussed and patient's questions were answered prior to conclusion to this encounter.  Patient verbalized understanding of preoperative instructions given in preadmission testing; discharge instructions available in EMR.    This note was dictated by a speech recognition.  Minor errors may have been detected in a speech recognition.

## 2023-12-26 NOTE — CPM/PAT H&P
[facility]/PAT Evaluation       Name: Sharath Weiss (Sharath Weiss)  /Age: 1965/58 y.o.     In-Person           HPI        Date of Consult:  23    Referring Provider: Dr Quiros      L2-4 Lateral lumbar fusion with posterior instrumentation, 23    Sharath Weiss is a 58 year-old male who presents to the [facility] for perioperative risk assessment prior to surgery.    Patient presents with a primary diagnosis of back pain.   He has a remote history of a Worker's Comp. injury to his lower back, this was treated with surgery by Dr. Franca Jeffries.  This ultimately required an in situ fusion at L4-5.     His biggest complaint has been progressively worsening neurogenic claudication.  It is difficult for him to stand and walk for any length of time.  After about 5 or 10 minutes he is quite miserable and has to stop and sit down.  More recently he has started to get radicular symptoms in his right groin and anterior thigh.  He saw Dr. Huang a while back who recommended substantial spine surgery, recently saw Dr. Maradiaga who recommended a microdiscectomy only.     He has done therapy recently at [facility] in [place].                                          This note was created in part upon personal review of patient's medical records.      Patient is scheduled to have L2-4 Lateral lumbar fusion with posterior instrumentation,      Pt denies any past history of anesthetic complications such as PONV, awareness, prolonged sedation, dental damage, aspiration, cardiac arrest, difficult intubation, difficult I.V. access or unexpected hospital admissions.  NO malignant hyperthermia and or pseudocholinesterase deficiency.  No history of blood transfusions     STOP BANG 3    The patient is not a Sikhism and will accept blood and blood products if medically indicated.   Type and screen sent.     Past Medical History:   Diagnosis Date    BPH (benign prostatic hyperplasia)     Cervical disc disease      Discoid lupus erythematosus     Diverticulitis     s/p bowel resection     History of sepsis      - left knee    Hyperlipidemia     Hypertension     Insomnia     Lumbar stenosis     Osteoarthritis     Spinal stenosis     Tinnitus     Trigger finger of thumb     Vitamin D deficiency        Past Surgical History:   Procedure Laterality Date    ABSCESS DRAINAGE      abdominal    ANTERIOR CRUCIATE LIGAMENT REPAIR  2015    Primary Repair Of Knee Ligament Cruciate Anterior    BOWEL RESECTION      COLONOSCOPY  2022    Complete Colonoscopy    ELBOW FRACTURE SURGERY Left     KNEE ARTHROSCOPY W/ DEBRIDEMENT  2015    Knee Arthroscopy (Therapeutic)    KNEE ARTHROSCOPY W/ HARDWARE REMOVAL Left     2015    LUMBAR FUSION  2015    Lumbar Vertebral Fusion    LUMBAR LAMINECTOMY  2015    Laminectomy Lumbar    OTHER SURGICAL HISTORY  2020    Epidural steroid injection    OTHER SURGICAL HISTORY  2018    Arthroscopy Shoulder Right x 2    SHOULDER ARTHROSCOPY Left     x 3    UPPER GASTROINTESTINAL ENDOSCOPY         Social History     Socioeconomic History    Marital status:      Spouse name: Not on file    Number of children: Not on file    Years of education: Not on file    Highest education level: Not on file   Occupational History    Not on file   Tobacco Use    Smoking status: Former     Types: Cigarettes     Quit date:      Years since quittin.9    Smokeless tobacco: Never   Vaping Use    Vaping Use: Every day   Substance and Sexual Activity    Alcohol use: Yes     Alcohol/week: 12.0 standard drinks of alcohol     Types: 12 Cans of beer per week    Drug use: Never    Sexual activity: Not on file   Other Topics Concern    Not on file   Social History Narrative    Not on file     Social Determinants of Health     Financial Resource Strain: Not on file   Food Insecurity: Not on file   Transportation Needs: Not on file   Physical Activity: Not on file   Stress: Not on  file   Social Connections: Not on file   Intimate Partner Violence: Not on file   Housing Stability: Not on file        Family History   Problem Relation Name Age of Onset    Breast cancer Mother      Hypertension Father      Transient ischemic attack Father      Irritable bowel syndrome Sister         Allergies   Allergen Reactions    Vancomycin Nausea/vomiting, Anxiety and Tinnitus     Other reaction(s): Intolerance   Tinnitus and shaking          Current Outpatient Medications:     acetaminophen (Tylenol) 500 mg tablet, Take 1 tablet (500 mg) by mouth every 6 hours if needed for mild pain (1 - 3)., Disp: , Rfl:     ciclopirox 1 % shampoo, Wet hair and apply shampoo to scalp. Lather and leave on for 3 minutes. Rinse. Do this twice a week at night., Disp: 120 mL, Rfl: 11    cyclobenzaprine (Flexeril) 10 mg tablet, Take 1 tablet (10 mg) by mouth as needed at bedtime for muscle spasms., Disp: 60 tablet, Rfl: 2    fluticasone (Flonase Sensimist) 27.5 mcg/actuation nasal spray, Administer 1 spray into each nostril once daily as needed for rhinitis., Disp: , Rfl:     hydroxychloroquine (PlaqueniL) 200 mg tablet, Take 1 tablet (200 mg) by mouth once daily., Disp: 30 tablet, Rfl: 2    ibuprofen 400 mg tablet, Take 1 tablet (400 mg) by mouth every 6 hours if needed for mild pain (1 - 3)., Disp: , Rfl:     ketoconazole (NIZOral) 2 % shampoo, Apply 1 Application topically 2 times a week., Disp: , Rfl:     lido-diphen-Maalox 1:1:1 Magic Mouthwash, Swish and swallow 30 mL 3 times a day as needed (severe bdominal pain)., Disp: 300 mL, Rfl: 2    lisinopril 10 mg tablet, Take 1 tablet (10 mg) by mouth once daily., Disp: 90 tablet, Rfl: 3    pantoprazole (ProtoNix) 20 mg EC tablet, Take 1 tablet (20 mg) by mouth once daily. Do not crush, chew, or split., Disp: , Rfl:     rosuvastatin (Crestor) 10 mg tablet, Take 1 tablet (10 mg) by mouth once daily., Disp: 90 tablet, Rfl: 2    tacrolimus (Protopic) 0.1 % ointment, in the morning  "and at bedtime. APPLY AND GENTLY MASSAGE INTO AFFECTED AREA(S), Disp: , Rfl:     zolpidem (Ambien) 10 mg tablet, Take 1 tablet (10 mg) by mouth as needed at bedtime for sleep. (Patient taking differently: Take 1 tablet (10 mg) by mouth once daily at bedtime.), Disp: 90 tablet, Rfl: 1    azelastine (Astelin) 137 mcg (0.1 %) nasal spray, Administer 1 spray into each nostril in the morning and 1 spray before bedtime. Use in each nostril as directed., Disp: 30 mL, Rfl: 2     PAT ROS:   Constitutional:   neg    Neuro/Psych:   neg    Eyes:    WEARS GLASSES  neg    Ears:   neg    Nose:   neg    Mouth:   neg    Throat:   neg    Neck:   neg    Cardio:    FUNCTIONAL 4 METS. DENIES SOB WALKING UP 2 FLIGHTS OF STAIRS  Respiratory:   neg    Endocrine:   neg    GI:   neg    :   neg    Musculoskeletal:    BACK PAIN  Hematologic:   neg    Skin:  neg        Physical Exam  Vitals reviewed. Physical exam within normal limits.          PAT AIRWAY:   Airway:     Mallampati::  IV    Neck ROM::  Full  normal          Heart Rate:  [91]   Temp:  [36.6 °C (97.9 °F)]   Resp:  [18]   BP: (134)/(75)   Height:  [180.3 cm (5' 11\")]   Weight:  [87 kg (191 lb 12.8 oz)]   SpO2:  [97 %]      EKG in 12/26/23:  NSR  Normal EKG  HR 88 bpm    Lab Results   Component Value Date    WBC 9.5 12/21/2023    HGB 13.9 12/21/2023    HCT 41.9 12/21/2023    MCV 93 12/21/2023     12/21/2023      Lab Results   Component Value Date    GLUCOSE 95 12/26/2023    CALCIUM 9.6 12/26/2023     12/26/2023    K 3.9 12/26/2023    CO2 27 12/26/2023     12/26/2023    BUN 12 12/26/2023    CREATININE 0.79 12/26/2023      Lab Results   Component Value Date    INR 1.0 12/26/2023    PROTIME 10.8 12/26/2023      Assessment and Plan:         Patient is a 58 year-old male scheduled for a with Dr. Quiros on 12/29/23.  Patient has no active cardiac symptoms.   Patient denies any chest pain, tightness, heaviness, pressure, radiating pain, palpitations, irregular " heartbeats, lightheadedness, cough, congestion, shortness of breath, BLUNT, PND, near syncope, weight loss or gain.     RCRI  1  , 6 % Risk of MACE    Cardiology:  -- Controlled HTN: CBC, BMP and EKG ordered    Hematology:  Patient instructed to ambulate as soon as possible postoperatively to decrease thromboembolic risk.   Initiate mechanical DVT prophylaxis as soon as possible and initiate chemical prophylaxis when deemed safe from a bleeding standpoint post surgery.     STOP BANG: 3    Caprini: 5    Risk assessment complete.  Patient is scheduled for a low surgical risk procedure.        Preoperative medication instructions were provided and reviewed with the patient.  Any additional testing or evaluation was explained to the patient.  Nothing by mouth instructions were discussed and patient's questions were answered prior to conclusion to this encounter.  Patient verbalized understanding of preoperative instructions given in preadmission testing; discharge instructions available in EMR.    This note was dictated by a speech recognition.  Minor errors may have been detected in a speech recognition.

## 2023-12-28 LAB — STAPHYLOCOCCUS SPEC CULT: NORMAL

## 2023-12-29 ENCOUNTER — PHARMACY VISIT (OUTPATIENT)
Dept: PHARMACY | Facility: CLINIC | Age: 58
End: 2023-12-29
Payer: COMMERCIAL

## 2023-12-29 ENCOUNTER — APPOINTMENT (OUTPATIENT)
Dept: RADIOLOGY | Facility: HOSPITAL | Age: 58
End: 2023-12-29
Payer: COMMERCIAL

## 2023-12-29 ENCOUNTER — ANESTHESIA EVENT (OUTPATIENT)
Dept: OPERATING ROOM | Facility: HOSPITAL | Age: 58
End: 2023-12-29
Payer: COMMERCIAL

## 2023-12-29 ENCOUNTER — ANESTHESIA (OUTPATIENT)
Dept: OPERATING ROOM | Facility: HOSPITAL | Age: 58
End: 2023-12-29
Payer: COMMERCIAL

## 2023-12-29 ENCOUNTER — HOSPITAL ENCOUNTER (OUTPATIENT)
Facility: HOSPITAL | Age: 58
Setting detail: OBSERVATION
Discharge: HOME | End: 2023-12-30
Attending: ORTHOPAEDIC SURGERY | Admitting: ORTHOPAEDIC SURGERY
Payer: COMMERCIAL

## 2023-12-29 DIAGNOSIS — M48.062 NEUROGENIC CLAUDICATION DUE TO LUMBAR SPINAL STENOSIS: ICD-10-CM

## 2023-12-29 DIAGNOSIS — M48.062 SPINAL STENOSIS OF LUMBAR REGION WITH NEUROGENIC CLAUDICATION: Primary | ICD-10-CM

## 2023-12-29 LAB
ABO GROUP (TYPE) IN BLOOD: NORMAL
RH FACTOR (ANTIGEN D): NORMAL

## 2023-12-29 PROCEDURE — 22853 INSJ BIOMECHANICAL DEVICE: CPT | Performed by: ORTHOPAEDIC SURGERY

## 2023-12-29 PROCEDURE — 2500000004 HC RX 250 GENERAL PHARMACY W/ HCPCS (ALT 636 FOR OP/ED): Performed by: ANESTHESIOLOGY

## 2023-12-29 PROCEDURE — 97161 PT EVAL LOW COMPLEX 20 MIN: CPT | Mod: GP

## 2023-12-29 PROCEDURE — 20930 SP BONE ALGRFT MORSEL ADD-ON: CPT | Performed by: ORTHOPAEDIC SURGERY

## 2023-12-29 PROCEDURE — 2500000001 HC RX 250 WO HCPCS SELF ADMINISTERED DRUGS (ALT 637 FOR MEDICARE OP): Performed by: ANESTHESIOLOGY

## 2023-12-29 PROCEDURE — 7100000001 HC RECOVERY ROOM TIME - INITIAL BASE CHARGE: Performed by: ORTHOPAEDIC SURGERY

## 2023-12-29 PROCEDURE — 22842 INSERT SPINE FIXATION DEVICE: CPT | Performed by: PHYSICIAN ASSISTANT

## 2023-12-29 PROCEDURE — 22558 ARTHRD ANT NTRBD MIN DSC LUM: CPT | Performed by: ORTHOPAEDIC SURGERY

## 2023-12-29 PROCEDURE — 3600000008 HC OR TIME - EACH INCREMENTAL 1 MINUTE - PROCEDURE LEVEL THREE: Performed by: ORTHOPAEDIC SURGERY

## 2023-12-29 PROCEDURE — C1889 IMPLANT/INSERT DEVICE, NOC: HCPCS | Performed by: ORTHOPAEDIC SURGERY

## 2023-12-29 PROCEDURE — 2500000001 HC RX 250 WO HCPCS SELF ADMINISTERED DRUGS (ALT 637 FOR MEDICARE OP): Performed by: PHYSICIAN ASSISTANT

## 2023-12-29 PROCEDURE — 3600000003 HC OR TIME - INITIAL BASE CHARGE - PROCEDURE LEVEL THREE: Performed by: ORTHOPAEDIC SURGERY

## 2023-12-29 PROCEDURE — 96365 THER/PROPH/DIAG IV INF INIT: CPT

## 2023-12-29 PROCEDURE — RXMED WILLOW AMBULATORY MEDICATION CHARGE

## 2023-12-29 PROCEDURE — 2500000005 HC RX 250 GENERAL PHARMACY W/O HCPCS: Performed by: ANESTHESIOLOGY

## 2023-12-29 PROCEDURE — 22585 ARTHRD ANT NTRBD MIN DSC EA: CPT | Performed by: ORTHOPAEDIC SURGERY

## 2023-12-29 PROCEDURE — 7100000002 HC RECOVERY ROOM TIME - EACH INCREMENTAL 1 MINUTE: Performed by: ORTHOPAEDIC SURGERY

## 2023-12-29 PROCEDURE — 3700000001 HC GENERAL ANESTHESIA TIME - INITIAL BASE CHARGE: Performed by: ORTHOPAEDIC SURGERY

## 2023-12-29 PROCEDURE — 22853 INSJ BIOMECHANICAL DEVICE: CPT | Performed by: PHYSICIAN ASSISTANT

## 2023-12-29 PROCEDURE — A4217 STERILE WATER/SALINE, 500 ML: HCPCS | Performed by: ORTHOPAEDIC SURGERY

## 2023-12-29 PROCEDURE — 22842 INSERT SPINE FIXATION DEVICE: CPT | Performed by: ORTHOPAEDIC SURGERY

## 2023-12-29 PROCEDURE — 22585 ARTHRD ANT NTRBD MIN DSC EA: CPT | Performed by: PHYSICIAN ASSISTANT

## 2023-12-29 PROCEDURE — A22633 PR ARTHDSIS POST/POSTEROLATRL/POSTINTERBODY LUMBAR: Performed by: ANESTHESIOLOGIST ASSISTANT

## 2023-12-29 PROCEDURE — 2780000003 HC OR 278 NO HCPCS: Performed by: ORTHOPAEDIC SURGERY

## 2023-12-29 PROCEDURE — 2500000005 HC RX 250 GENERAL PHARMACY W/O HCPCS: Performed by: ANESTHESIOLOGIST ASSISTANT

## 2023-12-29 PROCEDURE — 2720000007 HC OR 272 NO HCPCS: Performed by: ORTHOPAEDIC SURGERY

## 2023-12-29 PROCEDURE — 96375 TX/PRO/DX INJ NEW DRUG ADDON: CPT

## 2023-12-29 PROCEDURE — 2500000004 HC RX 250 GENERAL PHARMACY W/ HCPCS (ALT 636 FOR OP/ED): Performed by: ORTHOPAEDIC SURGERY

## 2023-12-29 PROCEDURE — 2500000001 HC RX 250 WO HCPCS SELF ADMINISTERED DRUGS (ALT 637 FOR MEDICARE OP): Performed by: ANESTHESIOLOGIST ASSISTANT

## 2023-12-29 PROCEDURE — G0378 HOSPITAL OBSERVATION PER HR: HCPCS

## 2023-12-29 PROCEDURE — A22633 PR ARTHDSIS POST/POSTEROLATRL/POSTINTERBODY LUMBAR: Performed by: ANESTHESIOLOGY

## 2023-12-29 PROCEDURE — C1821 INTERSPINOUS IMPLANT: HCPCS | Performed by: ORTHOPAEDIC SURGERY

## 2023-12-29 PROCEDURE — 2500000004 HC RX 250 GENERAL PHARMACY W/ HCPCS (ALT 636 FOR OP/ED): Performed by: ANESTHESIOLOGIST ASSISTANT

## 2023-12-29 PROCEDURE — 22558 ARTHRD ANT NTRBD MIN DSC LUM: CPT | Performed by: PHYSICIAN ASSISTANT

## 2023-12-29 PROCEDURE — C1713 ANCHOR/SCREW BN/BN,TIS/BN: HCPCS | Performed by: ORTHOPAEDIC SURGERY

## 2023-12-29 PROCEDURE — 3700000002 HC GENERAL ANESTHESIA TIME - EACH INCREMENTAL 1 MINUTE: Performed by: ORTHOPAEDIC SURGERY

## 2023-12-29 PROCEDURE — 76000 FLUOROSCOPY <1 HR PHYS/QHP: CPT | Mod: 59

## 2023-12-29 PROCEDURE — 2500000001 HC RX 250 WO HCPCS SELF ADMINISTERED DRUGS (ALT 637 FOR MEDICARE OP): Performed by: ORTHOPAEDIC SURGERY

## 2023-12-29 PROCEDURE — 96376 TX/PRO/DX INJ SAME DRUG ADON: CPT

## 2023-12-29 PROCEDURE — 2500000004 HC RX 250 GENERAL PHARMACY W/ HCPCS (ALT 636 FOR OP/ED): Performed by: PHYSICIAN ASSISTANT

## 2023-12-29 PROCEDURE — 36415 COLL VENOUS BLD VENIPUNCTURE: CPT | Performed by: ORTHOPAEDIC SURGERY

## 2023-12-29 DEVICE — ACCESS SYSTEM, PEDICLE, I-PAS III, BEVELED TIP, STERILE: Type: IMPLANTABLE DEVICE | Site: SPINE LUMBAR | Status: FUNCTIONAL

## 2023-12-29 DEVICE — SCREW, RELINE MAS RED, 6.5X55MM POLY 2C: Type: IMPLANTABLE DEVICE | Site: SPINE LUMBAR | Status: FUNCTIONAL

## 2023-12-29 DEVICE — IMPLANTABLE DEVICE: Type: IMPLANTABLE DEVICE | Site: SPINE LUMBAR | Status: FUNCTIONAL

## 2023-12-29 DEVICE — SCREW, RELINE MAS RED, 6.5X45MM POLY 2C: Type: IMPLANTABLE DEVICE | Site: SPINE LUMBAR | Status: FUNCTIONAL

## 2023-12-29 DEVICE — SPACER, MODULUS XLW, 10X22X55MM, 10 DEG: Type: IMPLANTABLE DEVICE | Site: SPINE LUMBAR | Status: FUNCTIONAL

## 2023-12-29 DEVICE — SCREW, RELINE MAS RED, 6.5X50MM POLY 2C: Type: IMPLANTABLE DEVICE | Site: SPINE LUMBAR | Status: FUNCTIONAL

## 2023-12-29 DEVICE — BONE MATRIX, OSTEOCEL PRO CELLULAR, LARGE: Type: IMPLANTABLE DEVICE | Site: SPINE LUMBAR | Status: FUNCTIONAL

## 2023-12-29 DEVICE — K WIRE, NITINOL, PRECEPT, BEVEL TIP: Type: IMPLANTABLE DEVICE | Site: SPINE LUMBAR | Status: FUNCTIONAL

## 2023-12-29 DEVICE — SCREW, RELINE LOCK, 5.5MM OPEN TULIP: Type: IMPLANTABLE DEVICE | Site: SPINE LUMBAR | Status: FUNCTIONAL

## 2023-12-29 RX ORDER — LIDOCAINE HYDROCHLORIDE 40 MG/ML
SOLUTION TOPICAL AS NEEDED
Status: DISCONTINUED | OUTPATIENT
Start: 2023-12-29 | End: 2023-12-29

## 2023-12-29 RX ORDER — PANTOPRAZOLE SODIUM 20 MG/1
20 TABLET, DELAYED RELEASE ORAL DAILY
Status: DISCONTINUED | OUTPATIENT
Start: 2023-12-29 | End: 2023-12-30 | Stop reason: HOSPADM

## 2023-12-29 RX ORDER — SODIUM CHLORIDE, SODIUM LACTATE, POTASSIUM CHLORIDE, CALCIUM CHLORIDE 600; 310; 30; 20 MG/100ML; MG/100ML; MG/100ML; MG/100ML
100 INJECTION, SOLUTION INTRAVENOUS CONTINUOUS
Status: DISCONTINUED | OUTPATIENT
Start: 2023-12-29 | End: 2023-12-29 | Stop reason: HOSPADM

## 2023-12-29 RX ORDER — PROCHLORPERAZINE 25 MG/1
25 SUPPOSITORY RECTAL EVERY 12 HOURS PRN
Status: DISCONTINUED | OUTPATIENT
Start: 2023-12-29 | End: 2023-12-30 | Stop reason: HOSPADM

## 2023-12-29 RX ORDER — ONDANSETRON HYDROCHLORIDE 2 MG/ML
4 INJECTION, SOLUTION INTRAVENOUS EVERY 8 HOURS PRN
Status: DISCONTINUED | OUTPATIENT
Start: 2023-12-29 | End: 2023-12-30 | Stop reason: HOSPADM

## 2023-12-29 RX ORDER — METHOCARBAMOL 500 MG/1
1000 TABLET, FILM COATED ORAL 3 TIMES DAILY
Status: DISCONTINUED | OUTPATIENT
Start: 2023-12-29 | End: 2023-12-30 | Stop reason: HOSPADM

## 2023-12-29 RX ORDER — ROSUVASTATIN CALCIUM 10 MG/1
10 TABLET, COATED ORAL DAILY
Status: DISCONTINUED | OUTPATIENT
Start: 2023-12-29 | End: 2023-12-30 | Stop reason: HOSPADM

## 2023-12-29 RX ORDER — LIDOCAINE HYDROCHLORIDE 20 MG/ML
INJECTION, SOLUTION EPIDURAL; INFILTRATION; INTRACAUDAL; PERINEURAL AS NEEDED
Status: DISCONTINUED | OUTPATIENT
Start: 2023-12-29 | End: 2023-12-29

## 2023-12-29 RX ORDER — METHOCARBAMOL 100 MG/ML
INJECTION, SOLUTION INTRAMUSCULAR; INTRAVENOUS AS NEEDED
Status: DISCONTINUED | OUTPATIENT
Start: 2023-12-29 | End: 2023-12-29

## 2023-12-29 RX ORDER — LABETALOL HYDROCHLORIDE 5 MG/ML
5 INJECTION, SOLUTION INTRAVENOUS ONCE AS NEEDED
Status: DISCONTINUED | OUTPATIENT
Start: 2023-12-29 | End: 2023-12-29 | Stop reason: HOSPADM

## 2023-12-29 RX ORDER — KETOROLAC TROMETHAMINE 30 MG/ML
INJECTION, SOLUTION INTRAMUSCULAR; INTRAVENOUS AS NEEDED
Status: DISCONTINUED | OUTPATIENT
Start: 2023-12-29 | End: 2023-12-29

## 2023-12-29 RX ORDER — NALOXONE HYDROCHLORIDE 0.4 MG/ML
0.2 INJECTION, SOLUTION INTRAMUSCULAR; INTRAVENOUS; SUBCUTANEOUS EVERY 5 MIN PRN
Status: DISCONTINUED | OUTPATIENT
Start: 2023-12-29 | End: 2023-12-30 | Stop reason: HOSPADM

## 2023-12-29 RX ORDER — CEFAZOLIN SODIUM 2 G/100ML
2 INJECTION, SOLUTION INTRAVENOUS EVERY 6 HOURS
Status: COMPLETED | OUTPATIENT
Start: 2023-12-29 | End: 2023-12-30

## 2023-12-29 RX ORDER — SODIUM CHLORIDE, SODIUM LACTATE, POTASSIUM CHLORIDE, CALCIUM CHLORIDE 600; 310; 30; 20 MG/100ML; MG/100ML; MG/100ML; MG/100ML
100 INJECTION, SOLUTION INTRAVENOUS CONTINUOUS
Status: DISCONTINUED | OUTPATIENT
Start: 2023-12-29 | End: 2023-12-30 | Stop reason: HOSPADM

## 2023-12-29 RX ORDER — LIDOCAINE HYDROCHLORIDE 10 MG/ML
0.1 INJECTION, SOLUTION EPIDURAL; INFILTRATION; INTRACAUDAL; PERINEURAL ONCE
Status: DISCONTINUED | OUTPATIENT
Start: 2023-12-29 | End: 2023-12-29 | Stop reason: HOSPADM

## 2023-12-29 RX ORDER — CEFAZOLIN SODIUM 2 G/100ML
2 INJECTION, SOLUTION INTRAVENOUS ONCE
Status: COMPLETED | OUTPATIENT
Start: 2023-12-29 | End: 2023-12-29

## 2023-12-29 RX ORDER — SUCCINYLCHOLINE CHLORIDE 20 MG/ML
INJECTION INTRAMUSCULAR; INTRAVENOUS AS NEEDED
Status: DISCONTINUED | OUTPATIENT
Start: 2023-12-29 | End: 2023-12-29

## 2023-12-29 RX ORDER — DIPHENHYDRAMINE HYDROCHLORIDE 50 MG/ML
12.5 INJECTION INTRAMUSCULAR; INTRAVENOUS EVERY 6 HOURS PRN
Status: DISCONTINUED | OUTPATIENT
Start: 2023-12-29 | End: 2023-12-30 | Stop reason: HOSPADM

## 2023-12-29 RX ORDER — PROCHLORPERAZINE MALEATE 10 MG
10 TABLET ORAL EVERY 6 HOURS PRN
Status: DISCONTINUED | OUTPATIENT
Start: 2023-12-29 | End: 2023-12-30 | Stop reason: HOSPADM

## 2023-12-29 RX ORDER — OXYCODONE HYDROCHLORIDE 5 MG/1
5 TABLET ORAL EVERY 4 HOURS PRN
Status: DISCONTINUED | OUTPATIENT
Start: 2023-12-29 | End: 2023-12-29 | Stop reason: HOSPADM

## 2023-12-29 RX ORDER — LISINOPRIL 10 MG/1
10 TABLET ORAL DAILY
Status: DISCONTINUED | OUTPATIENT
Start: 2023-12-29 | End: 2023-12-30 | Stop reason: HOSPADM

## 2023-12-29 RX ORDER — BISACODYL 5 MG
10 TABLET, DELAYED RELEASE (ENTERIC COATED) ORAL DAILY PRN
Status: DISCONTINUED | OUTPATIENT
Start: 2023-12-29 | End: 2023-12-30 | Stop reason: HOSPADM

## 2023-12-29 RX ORDER — SODIUM CHLORIDE, SODIUM LACTATE, POTASSIUM CHLORIDE, CALCIUM CHLORIDE 600; 310; 30; 20 MG/100ML; MG/100ML; MG/100ML; MG/100ML
100 INJECTION, SOLUTION INTRAVENOUS CONTINUOUS
Status: DISCONTINUED | OUTPATIENT
Start: 2023-12-29 | End: 2023-12-29

## 2023-12-29 RX ORDER — ACETAMINOPHEN 325 MG/1
975 TABLET ORAL ONCE
Status: COMPLETED | OUTPATIENT
Start: 2023-12-29 | End: 2023-12-29

## 2023-12-29 RX ORDER — KETOROLAC TROMETHAMINE 30 MG/ML
15 INJECTION, SOLUTION INTRAMUSCULAR; INTRAVENOUS EVERY 6 HOURS SCHEDULED
Status: DISCONTINUED | OUTPATIENT
Start: 2023-12-29 | End: 2023-12-30 | Stop reason: HOSPADM

## 2023-12-29 RX ORDER — METHOCARBAMOL 500 MG/1
TABLET, FILM COATED ORAL
Qty: 60 TABLET | Refills: 2 | Status: SHIPPED | OUTPATIENT
Start: 2023-12-29 | End: 2024-03-12 | Stop reason: ALTCHOICE

## 2023-12-29 RX ORDER — HYDRALAZINE HYDROCHLORIDE 20 MG/ML
5 INJECTION INTRAMUSCULAR; INTRAVENOUS EVERY 30 MIN PRN
Status: DISCONTINUED | OUTPATIENT
Start: 2023-12-29 | End: 2023-12-29 | Stop reason: HOSPADM

## 2023-12-29 RX ORDER — OXYCODONE HYDROCHLORIDE 5 MG/1
5 TABLET ORAL EVERY 4 HOURS PRN
Qty: 40 TABLET | Refills: 0 | Status: SHIPPED | OUTPATIENT
Start: 2023-12-29 | End: 2024-01-12 | Stop reason: WASHOUT

## 2023-12-29 RX ORDER — HYDROXYCHLOROQUINE SULFATE 200 MG/1
200 TABLET, FILM COATED ORAL DAILY
Status: DISCONTINUED | OUTPATIENT
Start: 2023-12-29 | End: 2023-12-30 | Stop reason: HOSPADM

## 2023-12-29 RX ORDER — OXYCODONE HYDROCHLORIDE 5 MG/1
5 TABLET ORAL EVERY 4 HOURS PRN
Status: DISCONTINUED | OUTPATIENT
Start: 2023-12-29 | End: 2023-12-30 | Stop reason: HOSPADM

## 2023-12-29 RX ORDER — OXYCODONE HYDROCHLORIDE 5 MG/1
10 TABLET ORAL EVERY 4 HOURS PRN
Status: DISCONTINUED | OUTPATIENT
Start: 2023-12-29 | End: 2023-12-30 | Stop reason: HOSPADM

## 2023-12-29 RX ORDER — MIDAZOLAM HYDROCHLORIDE 1 MG/ML
INJECTION INTRAMUSCULAR; INTRAVENOUS AS NEEDED
Status: DISCONTINUED | OUTPATIENT
Start: 2023-12-29 | End: 2023-12-29

## 2023-12-29 RX ORDER — POLYETHYLENE GLYCOL 3350 17 G/17G
17 POWDER, FOR SOLUTION ORAL 2 TIMES DAILY PRN
COMMUNITY
Start: 2023-12-29 | End: 2024-01-28

## 2023-12-29 RX ORDER — GABAPENTIN 300 MG/1
600 CAPSULE ORAL ONCE
Status: COMPLETED | OUTPATIENT
Start: 2023-12-29 | End: 2023-12-29

## 2023-12-29 RX ORDER — DEXAMETHASONE SODIUM PHOSPHATE 4 MG/ML
8 INJECTION, SOLUTION INTRA-ARTICULAR; INTRALESIONAL; INTRAMUSCULAR; INTRAVENOUS; SOFT TISSUE EVERY 8 HOURS SCHEDULED
Status: COMPLETED | OUTPATIENT
Start: 2023-12-29 | End: 2023-12-30

## 2023-12-29 RX ORDER — PROCHLORPERAZINE EDISYLATE 5 MG/ML
10 INJECTION INTRAMUSCULAR; INTRAVENOUS EVERY 6 HOURS PRN
Status: DISCONTINUED | OUTPATIENT
Start: 2023-12-29 | End: 2023-12-30 | Stop reason: HOSPADM

## 2023-12-29 RX ORDER — ACETAMINOPHEN 325 MG/1
975 TABLET ORAL EVERY 8 HOURS
Status: DISCONTINUED | OUTPATIENT
Start: 2023-12-29 | End: 2023-12-30 | Stop reason: HOSPADM

## 2023-12-29 RX ORDER — PROPOFOL 10 MG/ML
INJECTION, EMULSION INTRAVENOUS AS NEEDED
Status: DISCONTINUED | OUTPATIENT
Start: 2023-12-29 | End: 2023-12-29

## 2023-12-29 RX ORDER — SODIUM CHLORIDE 0.9 G/100ML
IRRIGANT IRRIGATION AS NEEDED
Status: DISCONTINUED | OUTPATIENT
Start: 2023-12-29 | End: 2023-12-29 | Stop reason: HOSPADM

## 2023-12-29 RX ORDER — DEXAMETHASONE SODIUM PHOSPHATE 4 MG/ML
INJECTION, SOLUTION INTRA-ARTICULAR; INTRALESIONAL; INTRAMUSCULAR; INTRAVENOUS; SOFT TISSUE AS NEEDED
Status: DISCONTINUED | OUTPATIENT
Start: 2023-12-29 | End: 2023-12-29

## 2023-12-29 RX ORDER — ACETAMINOPHEN 500 MG
1000 TABLET ORAL EVERY 8 HOURS
Qty: 30 TABLET | Refills: 0 | COMMUNITY
Start: 2023-12-29 | End: 2024-01-12

## 2023-12-29 RX ORDER — ONDANSETRON HYDROCHLORIDE 2 MG/ML
INJECTION, SOLUTION INTRAVENOUS AS NEEDED
Status: DISCONTINUED | OUTPATIENT
Start: 2023-12-29 | End: 2023-12-29

## 2023-12-29 RX ORDER — ONDANSETRON HYDROCHLORIDE 2 MG/ML
4 INJECTION, SOLUTION INTRAVENOUS ONCE AS NEEDED
Status: DISCONTINUED | OUTPATIENT
Start: 2023-12-29 | End: 2023-12-29 | Stop reason: HOSPADM

## 2023-12-29 RX ORDER — ALBUTEROL SULFATE 0.83 MG/ML
2.5 SOLUTION RESPIRATORY (INHALATION) ONCE AS NEEDED
Status: DISCONTINUED | OUTPATIENT
Start: 2023-12-29 | End: 2023-12-29 | Stop reason: HOSPADM

## 2023-12-29 RX ORDER — FENTANYL CITRATE 50 UG/ML
INJECTION, SOLUTION INTRAMUSCULAR; INTRAVENOUS AS NEEDED
Status: DISCONTINUED | OUTPATIENT
Start: 2023-12-29 | End: 2023-12-29

## 2023-12-29 RX ORDER — ONDANSETRON 4 MG/1
4 TABLET, ORALLY DISINTEGRATING ORAL EVERY 8 HOURS PRN
Status: DISCONTINUED | OUTPATIENT
Start: 2023-12-29 | End: 2023-12-30 | Stop reason: HOSPADM

## 2023-12-29 RX ORDER — POLYETHYLENE GLYCOL 3350 17 G/17G
17 POWDER, FOR SOLUTION ORAL DAILY
Status: DISCONTINUED | OUTPATIENT
Start: 2023-12-29 | End: 2023-12-30 | Stop reason: HOSPADM

## 2023-12-29 RX ORDER — PROPOFOL 10 MG/ML
INJECTION, EMULSION INTRAVENOUS CONTINUOUS PRN
Status: DISCONTINUED | OUTPATIENT
Start: 2023-12-29 | End: 2023-12-29

## 2023-12-29 RX ADMIN — METHOCARBAMOL TABLETS 1000 MG: 500 TABLET, COATED ORAL at 20:36

## 2023-12-29 RX ADMIN — HYDROMORPHONE HYDROCHLORIDE 0.5 MG: 1 INJECTION, SOLUTION INTRAMUSCULAR; INTRAVENOUS; SUBCUTANEOUS at 09:27

## 2023-12-29 RX ADMIN — LISINOPRIL 10 MG: 10 TABLET ORAL at 20:36

## 2023-12-29 RX ADMIN — HYDROMORPHONE HYDROCHLORIDE 0.5 MG: 1 INJECTION, SOLUTION INTRAMUSCULAR; INTRAVENOUS; SUBCUTANEOUS at 10:08

## 2023-12-29 RX ADMIN — FENTANYL CITRATE 50 MCG: 50 INJECTION, SOLUTION INTRAMUSCULAR; INTRAVENOUS at 08:07

## 2023-12-29 RX ADMIN — HYDROMORPHONE HYDROCHLORIDE 0.5 MG: 1 INJECTION, SOLUTION INTRAMUSCULAR; INTRAVENOUS; SUBCUTANEOUS at 09:47

## 2023-12-29 RX ADMIN — OXYCODONE HYDROCHLORIDE 10 MG: 5 TABLET ORAL at 15:38

## 2023-12-29 RX ADMIN — OXYCODONE HYDROCHLORIDE 5 MG: 5 TABLET ORAL at 10:39

## 2023-12-29 RX ADMIN — Medication 2 L/MIN: at 09:30

## 2023-12-29 RX ADMIN — SODIUM CHLORIDE, SODIUM LACTATE, POTASSIUM CHLORIDE, AND CALCIUM CHLORIDE: 600; 310; 30; 20 INJECTION, SOLUTION INTRAVENOUS at 07:20

## 2023-12-29 RX ADMIN — ONDANSETRON 4 MG: 2 INJECTION INTRAMUSCULAR; INTRAVENOUS at 08:35

## 2023-12-29 RX ADMIN — POLYETHYLENE GLYCOL 3350 17 G: 17 POWDER, FOR SOLUTION ORAL at 15:39

## 2023-12-29 RX ADMIN — KETOROLAC TROMETHAMINE 15 MG: 30 INJECTION, SOLUTION INTRAMUSCULAR; INTRAVENOUS at 23:18

## 2023-12-29 RX ADMIN — HYDROMORPHONE HYDROCHLORIDE 0.5 MG: 1 INJECTION, SOLUTION INTRAMUSCULAR; INTRAVENOUS; SUBCUTANEOUS at 09:39

## 2023-12-29 RX ADMIN — SUCCINYLCHOLINE CHLORIDE 140 MG: 20 INJECTION, SOLUTION INTRAMUSCULAR; INTRAVENOUS at 07:47

## 2023-12-29 RX ADMIN — SODIUM CHLORIDE, POTASSIUM CHLORIDE, SODIUM LACTATE AND CALCIUM CHLORIDE 100 ML/HR: 600; 310; 30; 20 INJECTION, SOLUTION INTRAVENOUS at 07:31

## 2023-12-29 RX ADMIN — Medication 6 L/MIN: at 09:08

## 2023-12-29 RX ADMIN — ACETAMINOPHEN 975 MG: 325 TABLET ORAL at 23:18

## 2023-12-29 RX ADMIN — HYDROXYCHLOROQUINE SULFATE 200 MG: 200 TABLET, FILM COATED ORAL at 20:36

## 2023-12-29 RX ADMIN — ACETAMINOPHEN 975 MG: 325 TABLET ORAL at 15:38

## 2023-12-29 RX ADMIN — GABAPENTIN 600 MG: 300 CAPSULE ORAL at 07:31

## 2023-12-29 RX ADMIN — HYDROMORPHONE HYDROCHLORIDE 0.5 MG: 1 INJECTION, SOLUTION INTRAMUSCULAR; INTRAVENOUS; SUBCUTANEOUS at 10:00

## 2023-12-29 RX ADMIN — ACETAMINOPHEN 975 MG: 325 TABLET ORAL at 07:31

## 2023-12-29 RX ADMIN — CARBOXYMETHYLCELLULOSE SODIUM 2 DROP: 5 SOLUTION/ DROPS OPHTHALMIC at 07:47

## 2023-12-29 RX ADMIN — HYDROMORPHONE HYDROCHLORIDE 0.5 MG: 1 INJECTION, SOLUTION INTRAMUSCULAR; INTRAVENOUS; SUBCUTANEOUS at 10:30

## 2023-12-29 RX ADMIN — LIDOCAINE HYDROCHLORIDE 100 MG: 20 INJECTION, SOLUTION EPIDURAL; INFILTRATION; INTRACAUDAL; PERINEURAL at 07:47

## 2023-12-29 RX ADMIN — OXYCODONE HYDROCHLORIDE 10 MG: 5 TABLET ORAL at 20:36

## 2023-12-29 RX ADMIN — PANTOPRAZOLE SODIUM 20 MG: 20 TABLET, DELAYED RELEASE ORAL at 15:37

## 2023-12-29 RX ADMIN — CEFAZOLIN SODIUM 2 G: 2 INJECTION, SOLUTION INTRAVENOUS at 15:37

## 2023-12-29 RX ADMIN — CEFAZOLIN SODIUM 2 G: 2 INJECTION, SOLUTION INTRAVENOUS at 20:35

## 2023-12-29 RX ADMIN — SODIUM CHLORIDE, POTASSIUM CHLORIDE, SODIUM LACTATE AND CALCIUM CHLORIDE 100 ML/HR: 600; 310; 30; 20 INJECTION, SOLUTION INTRAVENOUS at 15:47

## 2023-12-29 RX ADMIN — KETOROLAC TROMETHAMINE 15 MG: 30 INJECTION, SOLUTION INTRAMUSCULAR; INTRAVENOUS at 13:19

## 2023-12-29 RX ADMIN — LIDOCAINE HYDROCHLORIDE 4 ML: 40 SOLUTION TOPICAL at 07:47

## 2023-12-29 RX ADMIN — ROSUVASTATIN 10 MG: 10 TABLET, FILM COATED ORAL at 20:36

## 2023-12-29 RX ADMIN — DEXAMETHASONE SODIUM PHOSPHATE 10 MG: 4 INJECTION, SOLUTION INTRAMUSCULAR; INTRAVENOUS at 07:55

## 2023-12-29 RX ADMIN — PROPOFOL 75 MCG/KG/MIN: 10 INJECTION, EMULSION INTRAVENOUS at 07:49

## 2023-12-29 RX ADMIN — PROPOFOL 200 MG: 10 INJECTION, EMULSION INTRAVENOUS at 07:47

## 2023-12-29 RX ADMIN — DEXAMETHASONE SODIUM PHOSPHATE 8 MG: 4 INJECTION, SOLUTION INTRAMUSCULAR; INTRAVENOUS at 15:38

## 2023-12-29 RX ADMIN — FENTANYL CITRATE 50 MCG: 50 INJECTION, SOLUTION INTRAMUSCULAR; INTRAVENOUS at 07:47

## 2023-12-29 RX ADMIN — MIDAZOLAM HYDROCHLORIDE 2 MG: 1 INJECTION, SOLUTION INTRAMUSCULAR; INTRAVENOUS at 07:41

## 2023-12-29 RX ADMIN — DEXAMETHASONE SODIUM PHOSPHATE 8 MG: 4 INJECTION, SOLUTION INTRAMUSCULAR; INTRAVENOUS at 23:18

## 2023-12-29 RX ADMIN — METHOCARBAMOL TABLETS 1000 MG: 500 TABLET, COATED ORAL at 15:39

## 2023-12-29 RX ADMIN — METHOCARBAMOL 1000 MG: 1000 INJECTION, SOLUTION INTRAMUSCULAR; INTRAVENOUS at 08:35

## 2023-12-29 RX ADMIN — KETOROLAC TROMETHAMINE 30 MG: 30 INJECTION, SOLUTION INTRAMUSCULAR; INTRAVENOUS at 08:39

## 2023-12-29 RX ADMIN — CEFAZOLIN SODIUM 2 G: 2 INJECTION, SOLUTION INTRAVENOUS at 07:45

## 2023-12-29 RX ADMIN — KETOROLAC TROMETHAMINE 15 MG: 30 INJECTION, SOLUTION INTRAMUSCULAR; INTRAVENOUS at 18:18

## 2023-12-29 SDOH — SOCIAL STABILITY: SOCIAL INSECURITY: HAVE YOU HAD THOUGHTS OF HARMING ANYONE ELSE?: NO

## 2023-12-29 SDOH — HEALTH STABILITY: MENTAL HEALTH: CURRENT SMOKER: 0

## 2023-12-29 SDOH — SOCIAL STABILITY: SOCIAL INSECURITY: HAS ANYONE EVER THREATENED TO HURT YOUR FAMILY OR YOUR PETS?: NO

## 2023-12-29 SDOH — SOCIAL STABILITY: SOCIAL INSECURITY: DO YOU FEEL UNSAFE GOING BACK TO THE PLACE WHERE YOU ARE LIVING?: NO

## 2023-12-29 SDOH — SOCIAL STABILITY: SOCIAL INSECURITY: DO YOU FEEL ANYONE HAS EXPLOITED OR TAKEN ADVANTAGE OF YOU FINANCIALLY OR OF YOUR PERSONAL PROPERTY?: NO

## 2023-12-29 SDOH — SOCIAL STABILITY: SOCIAL INSECURITY: ARE YOU OR HAVE YOU BEEN THREATENED OR ABUSED PHYSICALLY, EMOTIONALLY, OR SEXUALLY BY ANYONE?: NO

## 2023-12-29 SDOH — SOCIAL STABILITY: SOCIAL INSECURITY: WERE YOU ABLE TO COMPLETE ALL THE BEHAVIORAL HEALTH SCREENINGS?: YES

## 2023-12-29 SDOH — SOCIAL STABILITY: SOCIAL INSECURITY: ARE THERE ANY APPARENT SIGNS OF INJURIES/BEHAVIORS THAT COULD BE RELATED TO ABUSE/NEGLECT?: NO

## 2023-12-29 SDOH — SOCIAL STABILITY: SOCIAL INSECURITY: DOES ANYONE TRY TO KEEP YOU FROM HAVING/CONTACTING OTHER FRIENDS OR DOING THINGS OUTSIDE YOUR HOME?: NO

## 2023-12-29 SDOH — SOCIAL STABILITY: SOCIAL INSECURITY: ABUSE: ADULT

## 2023-12-29 ASSESSMENT — PAIN - FUNCTIONAL ASSESSMENT

## 2023-12-29 ASSESSMENT — LIFESTYLE VARIABLES
AUDIT-C TOTAL SCORE: 1
SKIP TO QUESTIONS 9-10: 1
HOW OFTEN DO YOU HAVE A DRINK CONTAINING ALCOHOL: MONTHLY OR LESS
AUDIT-C TOTAL SCORE: 1
HOW MANY STANDARD DRINKS CONTAINING ALCOHOL DO YOU HAVE ON A TYPICAL DAY: 1 OR 2
HOW OFTEN DO YOU HAVE 6 OR MORE DRINKS ON ONE OCCASION: NEVER

## 2023-12-29 ASSESSMENT — ACTIVITIES OF DAILY LIVING (ADL)
PATIENT'S MEMORY ADEQUATE TO SAFELY COMPLETE DAILY ACTIVITIES?: YES
GROOMING: INDEPENDENT
TOILETING: INDEPENDENT
ASSISTIVE_DEVICE: WALKER
LACK_OF_TRANSPORTATION: NO
FEEDING YOURSELF: INDEPENDENT
DRESSING YOURSELF: INDEPENDENT
WALKS IN HOME: INDEPENDENT
BATHING: INDEPENDENT
ADEQUATE_TO_COMPLETE_ADL: YES
HEARING - RIGHT EAR: FUNCTIONAL
JUDGMENT_ADEQUATE_SAFELY_COMPLETE_DAILY_ACTIVITIES: YES
HEARING - LEFT EAR: FUNCTIONAL

## 2023-12-29 ASSESSMENT — COGNITIVE AND FUNCTIONAL STATUS - GENERAL
CLIMB 3 TO 5 STEPS WITH RAILING: A LOT
HELP NEEDED FOR BATHING: A LITTLE
DRESSING REGULAR UPPER BODY CLOTHING: A LITTLE
MOBILITY SCORE: 17
TURNING FROM BACK TO SIDE WHILE IN FLAT BAD: A LITTLE
WALKING IN HOSPITAL ROOM: A LITTLE
DRESSING REGULAR LOWER BODY CLOTHING: A LITTLE
TURNING FROM BACK TO SIDE WHILE IN FLAT BAD: A LITTLE
TOILETING: A LITTLE
MOVING FROM LYING ON BACK TO SITTING ON SIDE OF FLAT BED WITH BEDRAILS: A LITTLE
DAILY ACTIVITIY SCORE: 20
MOBILITY SCORE: 18
DAILY ACTIVITIY SCORE: 21
MOVING TO AND FROM BED TO CHAIR: A LITTLE
MOVING TO AND FROM BED TO CHAIR: A LITTLE
HELP NEEDED FOR BATHING: A LITTLE
STANDING UP FROM CHAIR USING ARMS: A LITTLE
STANDING UP FROM CHAIR USING ARMS: A LITTLE
TURNING FROM BACK TO SIDE WHILE IN FLAT BAD: A LITTLE
WALKING IN HOSPITAL ROOM: A LITTLE
DRESSING REGULAR LOWER BODY CLOTHING: A LITTLE
PATIENT BASELINE BEDBOUND: NO
DRESSING REGULAR UPPER BODY CLOTHING: A LITTLE
CLIMB 3 TO 5 STEPS WITH RAILING: A LITTLE
WALKING IN HOSPITAL ROOM: A LITTLE
CLIMB 3 TO 5 STEPS WITH RAILING: A LITTLE
MOBILITY SCORE: 18
MOVING TO AND FROM BED TO CHAIR: A LITTLE
MOVING FROM LYING ON BACK TO SITTING ON SIDE OF FLAT BED WITH BEDRAILS: A LITTLE
STANDING UP FROM CHAIR USING ARMS: A LITTLE
MOVING FROM LYING ON BACK TO SITTING ON SIDE OF FLAT BED WITH BEDRAILS: A LITTLE

## 2023-12-29 ASSESSMENT — PAIN SCALES - GENERAL
PAINLEVEL_OUTOF10: 9
PAINLEVEL_OUTOF10: 8
PAINLEVEL_OUTOF10: 8
PAINLEVEL_OUTOF10: 0 - NO PAIN
PAINLEVEL_OUTOF10: 7
PAINLEVEL_OUTOF10: 7
PAINLEVEL_OUTOF10: 3
PAINLEVEL_OUTOF10: 9
PAINLEVEL_OUTOF10: 9
PAINLEVEL_OUTOF10: 8
PAINLEVEL_OUTOF10: 5 - MODERATE PAIN
PAINLEVEL_OUTOF10: 6
PAINLEVEL_OUTOF10: 6
PAINLEVEL_OUTOF10: 4
PAINLEVEL_OUTOF10: 4
PAINLEVEL_OUTOF10: 8
PAINLEVEL_OUTOF10: 6
PAINLEVEL_OUTOF10: 7
PAINLEVEL_OUTOF10: 5 - MODERATE PAIN
PAINLEVEL_OUTOF10: 9
PAINLEVEL_OUTOF10: 3
PAINLEVEL_OUTOF10: 4

## 2023-12-29 ASSESSMENT — COLUMBIA-SUICIDE SEVERITY RATING SCALE - C-SSRS
2. HAVE YOU ACTUALLY HAD ANY THOUGHTS OF KILLING YOURSELF?: NO
1. IN THE PAST MONTH, HAVE YOU WISHED YOU WERE DEAD OR WISHED YOU COULD GO TO SLEEP AND NOT WAKE UP?: NO
6. HAVE YOU EVER DONE ANYTHING, STARTED TO DO ANYTHING, OR PREPARED TO DO ANYTHING TO END YOUR LIFE?: NO

## 2023-12-29 ASSESSMENT — PATIENT HEALTH QUESTIONNAIRE - PHQ9
SUM OF ALL RESPONSES TO PHQ9 QUESTIONS 1 & 2: 0
1. LITTLE INTEREST OR PLEASURE IN DOING THINGS: NOT AT ALL
2. FEELING DOWN, DEPRESSED OR HOPELESS: NOT AT ALL

## 2023-12-29 ASSESSMENT — PAIN DESCRIPTION - LOCATION
LOCATION: BACK

## 2023-12-29 ASSESSMENT — PAIN DESCRIPTION - ORIENTATION: ORIENTATION: RIGHT;LOWER

## 2023-12-29 NOTE — ANESTHESIA PREPROCEDURE EVALUATION
Patient: Sharath Weiss    Procedure Information       Anesthesia Start Date/Time: 12/29/23 0740    Procedure: L2-L4 Lateral Lumbar Fusion; Posterior Instrumentation (Spine Lumbar)    Location: [facility] OR 09 / Virtual [facility] OR    Surgeons: Nasim Quiros MD          57 yo M hx prior back surgery now with neurogenic claudication.  Other hx includes Lupus, BPH, prior bowel resection, HTN, controlled GERD.    Relevant Problems   Cardiovascular   (+) Hyperlipidemia   (+) Hypertension, essential      Neuro/Psych   (+) Cervico-occipital neuralgia   (+) Peripheral neuropathy      Musculoskeletal   (+) Chronic low back pain   (+) Chronic pain syndrome   (+) DJD (degenerative joint disease)   (+) Lumbar stenosis   (+) Osteoarthritis of right hip   (+) Spinal stenosis of lumbar region with neurogenic claudication      Infectious Disease   (+) Tinea unguium   (+) Yeast dermatitis of penis      Other   (+) Arthritis of elbow, left   (+) Arthritis of neck       Clinical information reviewed:   Tobacco  Allergies  Meds   Med Hx  Surg Hx   Fam Hx  Soc Hx        NPO Detail:  NPO/Void Status  Date of Last Liquid: 12/29/23  Time of Last Liquid: 0500  Date of Last Solid: 12/28/23  Time of Last Solid: 2200  Last Intake Type: Clear fluids  Time of Last Void: 0500         Physical Exam    Airway  Mallampati: I  TM distance: >3 FB  Neck ROM: full     Cardiovascular   Rate: normal     Dental - normal exam     Pulmonary - normal exam     Abdominal            Anesthesia Plan    ASA 2     general     The patient is not a current smoker.    intravenous induction   Postoperative administration of opioids is intended.  Anesthetic plan and risks discussed with patient.

## 2023-12-29 NOTE — ANESTHESIA PROCEDURE NOTES
Airway  Date/Time: 12/29/2023 7:48 AM  Urgency: elective    Airway not difficult    Staffing  Performed: BRANNON   Authorized by: Los House MD    Performed by: BRANNON Weathers  Patient location during procedure: OR    Indications and Patient Condition  Indications for airway management: anesthesia  Spontaneous Ventilation: absent  Sedation level: deep  Preoxygenated: yes  Patient position: sniffing  MILS maintained throughout  Mask difficulty assessment: 1 - vent by mask    Final Airway Details  Final airway type: endotracheal airway      Successful airway: ETT  Cuffed: yes   Successful intubation technique: direct laryngoscopy  Blade: Caroline  Blade size: #4  ETT size (mm): 7.5  Cormack-Lehane Classification: grade I - full view of glottis  Placement verified by: chest auscultation and capnometry   Measured from: lips  ETT to lips (cm): 23  Number of attempts at approach: 1

## 2023-12-29 NOTE — PROGRESS NOTES
Physical Therapy    Physical Therapy Evaluation    Patient Name: Sharath Weiss  MRN: [id number]  Today's Date: 12/29/2023   Time Calculation  Start Time: 1547  Stop Time: 1604  Time Calculation (min): 17 min    Assessment/Plan   PT Assessment  PT Assessment Results: Pain, Decreased mobility, Orthopedic restrictions  Rehab Prognosis: Good  Barriers to Discharge: none  Evaluation/Treatment Tolerance: Patient tolerated treatment well  Medical Staff Made Aware: Yes  Strengths: Ability to acquire knowledge, Premorbid level of function, Support of Caregivers  End of Session Communication: Bedside nurse  Assessment Comment: Pt in reclinter chair at end of PT session. Pt tolerated PT well and all needs met. Wife in room. Pt would benefit from low intensity PT to maximize functional mobility, and safety.  End of Session Patient Position: Up in chair, Alarm on  IP OR SWING BED PT PLAN  Inpatient or Swing Bed: Inpatient  PT Plan  Treatment/Interventions: Transfer training, Gait training, Stair training, Balance training, Therapeutic activity, Postural re-education  PT Plan: Skilled PT  PT Frequency: Daily  PT Discharge Recommendations: Low intensity level of continued care  Equipment Recommended upon Discharge: Wheeled walker  PT Recommended Transfer Status: Contact guard  PT - OK to Discharge: Yes (Per POC)      Subjective   General Visit Information:  General  Reason for Referral: 58 year old male POD 0 lumbar spine surgery  Referred By: [place] (PAC)  Past Medical History Relevant to Rehab: BPH, cervical disc disease, lupus, diverticulitis, sepsis left knee, HLD, HTN, insomnia, lumbar stenosis, tinnitus, ACL repair, elbow surgery,  Family/Caregiver Present: Yes (wife)  Prior to Session Communication: Bedside nurse  Patient Position Received: Bed, 3 rail up, Alarm on  Preferred Learning Style: auditory, verbal, visual, written  General Comment: Pt recevied supine lying, wife in room and no apparent distress. PIV in place. Pt  pleasant and agreeable to PT.  Home Living:  Home Living  Type of Home: House  Lives With: Spouse (2 kids)  Home Layout: Two level  Home Access: Stairs to enter without rails  Entrance Stairs-Rails: None  Entrance Stairs-Number of Steps: 2  Bathroom Shower/Tub: Tub/shower unit  Bathroom Toilet: Handicapped height  Prior Level of Function:  Prior Function Per Pt/Caregiver Report  Level of Pine: Independent with ADLs and functional transfers, Independent with homemaking with ambulation  Receives Help From: Family  Precautions:  Precautions  Medical Precautions: Fall precautions  Post-Surgical Precautions: Spinal precautions  Vital Signs:       Objective   Pain:  Pain Assessment  Pain Assessment: 0-10  Pain Score: 6  Pain Type: Surgical pain  Pain Location: Back  Pain Orientation: Lower  Cognition:  Cognition  Overall Cognitive Status: Within Functional Limits  Attention: Within Functional Limits  Impulsive: Within functional limits  Processing Speed: Within funtional limits    General Assessments:  General Observation  General Observation: no apparent distress               Activity Tolerance  Endurance: Endurance does not limit participation in activity         Strength  Strength Comments: Bilat LEs WFLs  Strength  Strength Comments: Bilat LEs WFLs           Coordination  Movements are Fluid and Coordinated: Yes         Static Sitting Balance  Static Sitting-Balance Support: Feet supported  Static Sitting-Level of Assistance: Independent       Functional Assessments:       Bed Mobility  Bed Mobility: Yes  Bed Mobility 1  Bed Mobility 1: Supine to sitting, Rolling right (Cueing for spinal precautions/ log roll)  Level of Assistance 1: Close supervision    Transfers  Transfer: Yes  Transfer 1  Technique 1: Sit to stand, Stand to sit  Transfer Device 1: Walker  Transfer Level of Assistance 1: Contact guard    Ambulation/Gait Training  Ambulation/Gait Training Performed: Yes  Ambulation/Gait Training 1  Surface  1: Level tile  Device 1: Rolling walker  Assistance 1: Contact guard  Comments/Distance (ft) 1: Pt ambulated 200 feet with CG and assist for PIV. Tolerated well. used FWW . Normal gait pattern noted.       Extremity/Trunk Assessments:  RLE   RLE : Within Functional Limits  LLE   LLE : Within Functional Limits  Outcome Measures:  [facility] Basic Mobility  Turning from your back to your side while in a flat bed without using bedrails: A little  Moving from lying on your back to sitting on the side of a flat bed without using bedrails: A little  Moving to and from bed to chair (including a wheelchair): A little  Standing up from a chair using your arms (e.g. wheelchair or bedside chair): A little  To walk in hospital room: A little  Climbing 3-5 steps with railing: A little  Basic Mobility - Total Score: 18    Encounter Problems       Encounter Problems (Active)       Mobility       STG - Patient will ambulate 350 feet independently       Start:  12/29/23    Expected End:  01/05/24            STG - Patient will ascend and descend a flight of stairs independently while holding one rail       Start:  12/29/23    Expected End:  01/05/24               Safety       Goal 1- pt will follow spinal precautions independently       Start:  12/29/23    Expected End:  01/05/24               Transfers       STG - Transfer from bed to chair independently       Start:  12/29/23    Expected End:  01/05/24            STG - Patient will perform bed mobility independently       Start:  12/29/23    Expected End:  01/05/24            STG - Patient will transfer sit to and from stand independently       Start:  12/29/23    Expected End:  01/05/24                   Education Documentation  Handouts, taught by Kassidy Hughes PT at 12/29/2023  4:23 PM.  Learner: Patient  Readiness: Acceptance  Method: Explanation  Response: Verbalizes Understanding, Demonstrated Understanding    Precautions, taught by Kassidy Hughes PT at  12/29/2023  4:23 PM.  Learner: Patient  Readiness: Acceptance  Method: Explanation  Response: Verbalizes Understanding, Demonstrated Understanding    Body Mechanics, taught by Kassidy Hughes, PT at 12/29/2023  4:23 PM.  Learner: Patient  Readiness: Acceptance  Method: Explanation  Response: Verbalizes Understanding, Demonstrated Understanding    Home Exercise Program, taught by Kassidy Hughes, PT at 12/29/2023  4:23 PM.  Learner: Patient  Readiness: Acceptance  Method: Explanation  Response: Verbalizes Understanding, Demonstrated Understanding    Mobility Training, taught by Kassidy Hughes, PT at 12/29/2023  4:23 PM.  Learner: Patient  Readiness: Acceptance  Method: Explanation  Response: Verbalizes Understanding, Demonstrated Understanding    Education Comments  No comments found.

## 2023-12-29 NOTE — DISCHARGE INSTRUCTIONS
Activity  Progressively walk 2 times a day.  Weight bearing as tolerated.  No pushing, pulling, or lifting objects greater than 10 pounds.  No excessive bending, twisting. No heavy house or yard work.  You may shower when there is no drainage from the incision site for 48 hours.  You may not return to school/work until your follow up appointment.  You may not drive until your follow up appointment or while taking narcotic medication.    Wound Type: Surgical Incision  -Change the dressing daily and continue until the incision is no longer draining.   -Once the incision  has been dry for 48 hours, you may leave the dressing off and the site open to air.  -Cover the wound with an abdominal pad and secure it with paper tape around the edges.  -If there is a Prineo/Exofin dressing (mesh strip) over your incision, do not remove it with your     dressing changes. The dressing will slowly lift away from the skin over time.   -No Lotions or Creams on or around the incision site.  -No tub soaks  -You may use heat or ice to your incision sites and or back as needed for comfort.    Call the office if:  You are breathing faster than normal.  Fever of 100.4 F or higher.  Chills  Urinating less than 4 times per day.  Acting very sleepy and difficult to awaken.  Vomiting and not able to eat or drink for 12 hours  3 or more loose, watery bowel movements in 24 hours (Diarrhea)  Concerns over the appearance of your incision.    Return immediately to the ER:  Persistent bilateral leg pain and or numbness >24 hours.  Perineal numbness/sensory loss  Urinary retention >12 hours  Loss of bowel/ bladder control

## 2023-12-29 NOTE — ANESTHESIA POSTPROCEDURE EVALUATION
Patient: Sharath Weiss    Procedure Summary       Date: 12/29/23 Room / Location: [facility] A OR 09 / Virtual [facility] A OR    Anesthesia Start: 0740 Anesthesia Stop: 0912    Procedure: L2-L4 Lateral Lumbar Fusion; Posterior Instrumentation (Spine Lumbar) Diagnosis:       Neurogenic claudication due to lumbar spinal stenosis      (Neurogenic claudication due to lumbar spinal stenosis [M48.062])    Surgeons: Nasim Quiros MD Responsible Provider: Los House MD    Anesthesia Type: general ASA Status: 2            Anesthesia Type: general    Vitals Value Taken Time   /73 12/29/23 1319   Temp 36.8 °C (98.2 °F) 12/29/23 1319   Pulse 84 12/29/23 1319   Resp 14 12/29/23 1319   SpO2 98 % 12/29/23 1319       Anesthesia Post Evaluation    Patient participation: complete - patient participated  Level of consciousness: awake  Pain management: adequate  Airway patency: patent  Cardiovascular status: acceptable and hemodynamically stable  Respiratory status: acceptable  Hydration status: acceptable  Postoperative Nausea and Vomiting: none        No notable events documented.

## 2023-12-29 NOTE — OP NOTE
L2-L4 Lateral Lumbar Fusion; Posterior Instrumentation Operative Note     Date: 2023  OR Location: [facility] A OR    Name: Sharath Weiss, : 1965, Age: 58 y.o., MRN: [id number], Sex: male    Diagnosis  Pre-op Diagnosis     * Neurogenic claudication due to lumbar spinal stenosis [M48.062] Post-op Diagnosis     * Neurogenic claudication due to lumbar spinal stenosis [M48.062]     Procedures  L2-L4 Lateral Lumbar Fusion; Posterior Instrumentation   - TX ARTHRD ANT INTERBODY MIN DSC LUMBAR    TX ARTHRD ANT NTRBD MIN DSC EA ADDL INTERSPACE []  TX INSJ BIOMCHN DEV INTERVERTEBRAL DSC SPC W/ARTHRD []  TX ALLOGRAFT FOR SPINE SURGERY ONLY MORSELIZED []  TX POSTERIOR NON-SEGMENTAL INSTRUMENTATION []  Surgeons      * Nasim Quiros - Primary    Resident/Fellow/Other Assistant:  Surgeon(s) and Role:    Procedure Summary  Anesthesia: General  ASA: ASA status not filed in the log.  Anesthesia Staff: Anesthesiologist: Los House MD  C-AA: BRANNON Weathers  Estimated Blood Loss: 50mL  Intra-op Medications:   Medication Name Total Dose   sodium chloride 0.9 % irrigation solution 1,000 mL   Non-Formulary Medication 1 each   HYDROmorphone (Dilaudid) injection 0.5 mg 0.5 mg   lactated Ringer's infusion 248.33 mL   acetaminophen (Tylenol) tablet 975 mg 975 mg   ceFAZolin in dextrose (iso-os) (Ancef) IVPB 2 g 2 g   gabapentin (Neurontin) capsule 600 mg 600 mg              Anesthesia Record               Intraprocedure I/O Totals          Intake    .00 mL    Propofol Drip 0.00 mL    The total shown is the total volume documented since Anesthesia Start was filed.    ceFAZolin in dextrose (iso-os) (Ancef) IVPB 2 g 10.00 mL    Total Intake 810 mL       Output    Est. Blood Loss 50 mL    Total Output 50 mL       Net    Net Volume 760 mL          Specimen: No specimens collected     Staff:   Circulator: Moon Byers RN  Scrub Person: Vicki Lui         Drains and/or Catheters: * None in  log *    Tourniquet Times:         Implants:  Implants       Type Name Action Serial No.      Spinal Hardware SPACER, MODULUS XLW, [id number], 10 DEG - SN/ - [id number] Implanted N/     Spinal Hardware SPACER, MODULUS XLW, [id number], 10 DEG - SN/A - [id number] Implanted N/A     Neuro Interventional Implant ACCESS SYSTEM, PEDICLE, I-PAS III, BEVELED TIP, STERILE - SN/A - [id number] Implanted N/A     Graft BONE MATRIX, OSTEOCEL PRO CELLULAR, LARGE - [id number] - [id number] Implanted [id number]     Neuro Interventional Implant K WIRE, NITINOL, PRECEPT, BEVEL TIP - SN/A - [id number] Implanted N/A     Screw SCREW, RELINE MAS RED, 6.5X55MM POLY 2C - SN/A - [id number] Implanted N/A     Screw SCREW, RELINE MAS RED, 6.5X50MM POLY 2C - SN/A - [id number] Implanted N/A     Screw SCREW, RELINE MAS RED, 6.5X45MM POLY 2C - SN/A - [id number] Implanted N/A     Neuro Interventional Implant SCREW, RELINE LOCK, 5.5MM OPEN TULIP - SN/A - [id number] Implanted N/A     Screw ALANA, RELINE MAS, 5.5 X 80MM, LORDOTIC - SN/A - [id number] Implanted N/A              Findings: scoliosis L2-4, improved alignment with instrumentation.    Indications: Sharath Weiss is an 58 y.o. male who is having surgery for Neurogenic claudication due to lumbar spinal stenosis [M48.062].  Patient presented with intractable left leg radicular pain and neurogenic claudication.  Previous history of L4-5 laminectomy and fusion without instrumentation.  X-rays and MRI demonstrated degenerative scoliosis from L2-4 with moderate to severe central lateral recess stenosis.    The patient was seen in the preoperative area. The risks, benefits, complications, treatment options, non-operative alternatives, expected recovery and outcomes were discussed with the patient. The possibilities of reaction to medication, pulmonary aspiration, injury to surrounding structures, bleeding, recurrent infection, the need for additional procedures, failure to diagnose a condition, and creating a  complication requiring transfusion or operation were discussed with the patient. The patient concurred with the proposed plan, giving informed consent.  The site of surgery was properly noted/marked if necessary per policy. The patient has been actively warmed in preoperative area. Preoperative antibiotics have been ordered and given within 1 hours of incision. Venous thrombosis prophylaxis have been ordered including bilateral sequential compression devices    Procedure Details:   Patient was brought back to the operating room and general anesthesia was induced.  Leads for neural monitoring using directionally stimulated EMGs were placed in the lower extremities.  Patient was positioned in the lateral decubitus position with the right side up.  The bed was broken slightly.  Grounding electrodes were applied and a twitch test was performed.  Fluoroscopy was brought in and the bed was rotated to obtain perfect AP and lateral images.  The disc space was marked on the patient's skin using fluoroscopy.  The lateral spine was then prepped and draped in the usual sterile fashion.  A time-out was performed and preoperative antibiotics were given    Small incision was made just posterior to the planned incision for retroperitoneal access.  The abdominal wall was identified and dissected bluntly in line with its fibers.  Retroperitoneal contents including psoas muscle, inner table of the ilium, and undersurface of the 12th rib were palpated.  All retroperitoneal contents were bluntly swept off the lateral spine.  The incision was then made directly over the disc space.  Again the abdominal wall was dissected bluntly in line with its fibers.  The initial dilator was walked down to the lateral psoas muscle using my finger through the counter incision.  Localization was performed arthroscopy, the dilator was then passed through the psoas muscle under EMG stimulation.  The dilator was affixed to the lateral disc using a  guidewire.  Sequential dilation was performed, all EMG thresholds localized the lumbar plexus posterior to the dilators and a safe distance away.  The Maxcess retractor was then applied over the dilators and affixed to the table mounted arm.  The dilators removed, free running EMG probe was passed through the operative field and no neurologic structures were identified.  The discs patient was then passed on the posterior blade increase the retractor to the disc space.  Retractor positioning was verified under lateral fluoroscopy, the retractor was then opened primarily in the anterior to posterior direction.    Beginning at L2-3 an annulotomy was performed with a 15 blade. A Parra elevator was passed across the disc space to release the contralateral annulus. A thorough diskectomy was performed using a series of pituitary rongeurs and angled curettes.  Endplates were prepared using ring curettes.  Trial sizers were placed and a size 10 x 22 x 55 mm cage was filled with Osteocel bone graft and then passed across the disc space under fluoroscopic visualization.  Position was verified in AP and lateral planes, the  handle was then removed.  Hemostasis was obtained with FloSeal and bipolar electrocautery.    The same procedure was then carried out at the L3-4 levels.  12 x 22 x 55 mm size cage was placed at that level.    Posterior pedicle screws were then placed in a standard fashion through percutaneous incisions.  Tacho size was measured using calipers, tacho was fashioned into lordosis and passed the reduction towers.  It was held in place with locking caps.  The caps were finally tightened with a torque wrench    Final AP and lateral images were obtained verifying correct positioning and correct operative levels.    All wounds were irrigated.  They were closed in layers.  Dermabond Prineo and dry sterile dressings were then applied.  The patient was transferred off the OR table and awakened without difficulty.   There is no complication during the case.  I was present and scrubbed for the entire operation.    I performed this operation with a physician's assistant, as no qualified resident was available.  Nallely Callahan was the full and primary assistant during the entire case.    Complications:  None; patient tolerated the procedure well.    Disposition: PACU - hemodynamically stable.  Condition: stable         Additional Details:     Attending Attestation: A qualified resident physician was not available.    Nasim Quiros  Phone Number: [phone]

## 2023-12-30 VITALS
SYSTOLIC BLOOD PRESSURE: 138 MMHG | HEIGHT: 71 IN | WEIGHT: 193.12 LBS | OXYGEN SATURATION: 95 % | DIASTOLIC BLOOD PRESSURE: 77 MMHG | TEMPERATURE: 97.8 F | BODY MASS INDEX: 27.04 KG/M2 | RESPIRATION RATE: 15 BRPM | HEART RATE: 92 BPM

## 2023-12-30 LAB
ATRIAL RATE: 88 BPM
P AXIS: 56 DEGREES
P OFFSET: 202 MS
P ONSET: 146 MS
PR INTERVAL: 154 MS
Q ONSET: 223 MS
QRS COUNT: 15 BEATS
QRS DURATION: 88 MS
QT INTERVAL: 380 MS
QTC CALCULATION(BAZETT): 459 MS
QTC FREDERICIA: 431 MS
R AXIS: 22 DEGREES
T AXIS: 12 DEGREES
T OFFSET: 413 MS
VENTRICULAR RATE: 88 BPM

## 2023-12-30 PROCEDURE — 97116 GAIT TRAINING THERAPY: CPT | Mod: GP,CQ

## 2023-12-30 PROCEDURE — 96376 TX/PRO/DX INJ SAME DRUG ADON: CPT

## 2023-12-30 PROCEDURE — 97110 THERAPEUTIC EXERCISES: CPT | Mod: GP,CQ

## 2023-12-30 PROCEDURE — 97530 THERAPEUTIC ACTIVITIES: CPT | Mod: GP,CQ

## 2023-12-30 PROCEDURE — 2500000004 HC RX 250 GENERAL PHARMACY W/ HCPCS (ALT 636 FOR OP/ED): Performed by: PHYSICIAN ASSISTANT

## 2023-12-30 PROCEDURE — 2500000001 HC RX 250 WO HCPCS SELF ADMINISTERED DRUGS (ALT 637 FOR MEDICARE OP): Performed by: PHYSICIAN ASSISTANT

## 2023-12-30 PROCEDURE — G0378 HOSPITAL OBSERVATION PER HR: HCPCS

## 2023-12-30 RX ADMIN — SODIUM CHLORIDE, POTASSIUM CHLORIDE, SODIUM LACTATE AND CALCIUM CHLORIDE 100 ML/HR: 600; 310; 30; 20 INJECTION, SOLUTION INTRAVENOUS at 01:17

## 2023-12-30 RX ADMIN — CEFAZOLIN SODIUM 2 G: 2 INJECTION, SOLUTION INTRAVENOUS at 01:16

## 2023-12-30 RX ADMIN — POLYETHYLENE GLYCOL 3350 17 G: 17 POWDER, FOR SOLUTION ORAL at 10:13

## 2023-12-30 RX ADMIN — METHOCARBAMOL TABLETS 1000 MG: 500 TABLET, COATED ORAL at 10:13

## 2023-12-30 RX ADMIN — DEXAMETHASONE SODIUM PHOSPHATE 8 MG: 4 INJECTION, SOLUTION INTRAMUSCULAR; INTRAVENOUS at 05:26

## 2023-12-30 RX ADMIN — ACETAMINOPHEN 975 MG: 325 TABLET ORAL at 05:26

## 2023-12-30 RX ADMIN — ROSUVASTATIN 10 MG: 10 TABLET, FILM COATED ORAL at 10:13

## 2023-12-30 RX ADMIN — KETOROLAC TROMETHAMINE 15 MG: 30 INJECTION, SOLUTION INTRAMUSCULAR; INTRAVENOUS at 05:26

## 2023-12-30 RX ADMIN — PANTOPRAZOLE SODIUM 20 MG: 20 TABLET, DELAYED RELEASE ORAL at 05:26

## 2023-12-30 RX ADMIN — OXYCODONE HYDROCHLORIDE 10 MG: 5 TABLET ORAL at 10:17

## 2023-12-30 RX ADMIN — LISINOPRIL 10 MG: 10 TABLET ORAL at 10:13

## 2023-12-30 RX ADMIN — HYDROXYCHLOROQUINE SULFATE 200 MG: 200 TABLET, FILM COATED ORAL at 10:13

## 2023-12-30 ASSESSMENT — PAIN SCALES - GENERAL
PAINLEVEL_OUTOF10: 0 - NO PAIN
PAINLEVEL_OUTOF10: 8

## 2023-12-30 ASSESSMENT — COGNITIVE AND FUNCTIONAL STATUS - GENERAL
WALKING IN HOSPITAL ROOM: A LITTLE
STANDING UP FROM CHAIR USING ARMS: A LITTLE
CLIMB 3 TO 5 STEPS WITH RAILING: A LITTLE
MOBILITY SCORE: 17
MOVING TO AND FROM BED TO CHAIR: A LITTLE
MOVING TO AND FROM BED TO CHAIR: A LITTLE
MOBILITY SCORE: 20
MOVING FROM LYING ON BACK TO SITTING ON SIDE OF FLAT BED WITH BEDRAILS: A LITTLE
DRESSING REGULAR LOWER BODY CLOTHING: A LITTLE
TURNING FROM BACK TO SIDE WHILE IN FLAT BAD: A LITTLE
DRESSING REGULAR UPPER BODY CLOTHING: A LITTLE
STANDING UP FROM CHAIR USING ARMS: A LITTLE
DAILY ACTIVITIY SCORE: 21
WALKING IN HOSPITAL ROOM: A LITTLE
HELP NEEDED FOR BATHING: A LITTLE
CLIMB 3 TO 5 STEPS WITH RAILING: A LOT

## 2023-12-30 ASSESSMENT — PAIN DESCRIPTION - ORIENTATION: ORIENTATION: LEFT

## 2023-12-30 ASSESSMENT — PAIN DESCRIPTION - LOCATION: LOCATION: HIP

## 2023-12-30 ASSESSMENT — PAIN - FUNCTIONAL ASSESSMENT: PAIN_FUNCTIONAL_ASSESSMENT: 0-10

## 2023-12-30 NOTE — PROGRESS NOTES
Doing well today  Preop leg symptoms more or less resolved  Has been ambulating ok    incisions c/d/i  5/5 strength b/l lower ext  SILT L2-S1    OOB ad melany  Discharge home today

## 2023-12-30 NOTE — DISCHARGE SUMMARY
Discharge Diagnosis  Neurogenic claudication due to lumbar spinal stenosis    Issues Requiring Follow-Up  Lumbar spine surgery    Test Results Pending At Discharge  Pending Labs       No current pending labs.            Hospital Course   unremarkable    Pertinent Physical Exam At Time of Discharge  incisions c/d/i  5/5 strength b/l lower ext  SILT L2-S1      Home Medications     Medication List      START taking these medications     methocarbamol 500 mg tablet; Commonly known as: Robaxin; Take 1-2 tabs   every 8 hours as needed for spasms   oxyCODONE 5 mg immediate release tablet; Commonly known as: Roxicodone;   Take 1 tablet (5 mg) by mouth every 4 hours if needed for severe pain (7 -   10) for up to 7 days.   polyethylene glycol 17 gram/dose powder; Commonly known as: Miralax;   Take 17 g by mouth 2 times a day as needed (constipation).     CHANGE how you take these medications     acetaminophen 500 mg tablet; Commonly known as: Tylenol Extra Strength;   Take 2 tablets (1,000 mg) by mouth every 8 hours for 14 days.; What   changed: how much to take, when to take this, reasons to take this     CONTINUE taking these medications     azelastine 137 mcg (0.1 %) nasal spray; Commonly known as: Astelin;   Administer 1 spray into each nostril in the morning and 1 spray before   bedtime. Use in each nostril as directed.   ciclopirox 1 % shampoo; Wet hair and apply shampoo to scalp. Lather and   leave on for 3 minutes. Rinse. Do this twice a week at night.   Flonase Sensimist 27.5 mcg/actuation nasal spray; Generic drug:   fluticasone   hydroxychloroquine 200 mg tablet; Commonly known as: PlaqueniL; Take 1   tablet (200 mg) by mouth once daily.   ketoconazole 2 % shampoo; Commonly known as: NIZOral   lidocaine-diphenhydrAMINE-Maalox 1:1:1 825--40 mg/30 mL liquid   mouthwash; Commonly known as: Magic Mouthwash; Swish and swallow 30 mL 3   times a day as needed (severe bdominal pain).   lisinopril 10 mg tablet; Take 1  tablet (10 mg) by mouth once daily.   pantoprazole 20 mg EC tablet; Commonly known as: ProtoNix   rosuvastatin 10 mg tablet; Commonly known as: Crestor; Take 1 tablet (10   mg) by mouth once daily.   tacrolimus 0.1 % ointment; Commonly known as: Protopic   zolpidem 10 mg tablet; Commonly known as: Ambien; Take 1 tablet (10 mg)   by mouth as needed at bedtime for sleep.     STOP taking these medications     chlorhexidine 0.12 % solution; Commonly known as: Peridex   cyclobenzaprine 10 mg tablet; Commonly known as: Flexeril   ibuprofen 400 mg tablet       Outpatient Follow-Up  Future Appointments   Date Time Provider Department Center   1/17/2024  9:00 AM Nallely Callahan PA-C [id number] [facility]   1/29/2024  8:45 AM Hannah Holman PA-C [id number] [facility]   3/4/2024  8:45 AM Hannah Holman PA-C [id number] [facility]   3/12/2024  4:00 PM Buster Barney DO [id number] [place]   10/8/2024  4:00 PM DO ANASTASIA [id number] [place]       Nasim Quiros MD

## 2023-12-30 NOTE — CARE PLAN
The patient's goals for the shift include      The clinical goals for the shift include Patient will be free from pain throughout shift.

## 2023-12-30 NOTE — PROGRESS NOTES
Occupational Therapy                 Therapy Communication Note    Patient Name: Sharath Weiss  MRN: [id number]  Today's Date: 12/30/2023     Discipline: Occupational Therapy    Missed Visit Reason: Missed Visit Reason: Other (Comment) (Pt up ad melany in room on arrival, fully dressed, waiting for d/c paperwork. Reviewed spinal precautions re: ADLs for home going with no concerns from pt. Hx of lumbar fusion prior with good recall. No add't OT skilled needs ID'd at this time.)    Missed Time: Attempt [id number]    Comment: No formal OT evaluation completed this date d/t no acute OT skilled needs identified. Pt has assist PRN at discharge from family. OT screen only.

## 2023-12-30 NOTE — PROGRESS NOTES
Physical Therapy    Physical Therapy Treatment    Patient Name: Sharath Weiss  MRN: [id number]  Today's Date: 12/30/2023  Time Calculation  Start Time: 0924  Stop Time: 1006  Time Calculation (min): 42 min       Assessment/Plan   PT Assessment  PT Assessment Results: Decreased strength, Decreased endurance, Decreased safety awareness, Orthopedic restrictions  Rehab Prognosis: Good  Barriers to Discharge: none  Evaluation/Treatment Tolerance: Patient tolerated treatment well  End of Session Communication: Bedside nurse  End of Session Patient Position: Bed, 2 rail up, Alarm off, not on at start of session  PT Plan  Inpatient/Swing Bed or Outpatient: Inpatient  PT Plan  Treatment/Interventions: Bed mobility, Transfer training, Gait training, Stair training, Balance training, Strengthening, Endurance training, Range of motion, Therapeutic exercise, Therapeutic activity, Home exercise program  PT Plan: Skilled PT  PT Frequency: Daily  PT Discharge Recommendations: Low intensity level of continued care  Equipment Recommended upon Discharge: Wheeled walker  PT Recommended Transfer Status: Contact guard  PT - OK to Discharge: Yes (Per POC)      General Visit Information:   PT  Visit  PT Received On: 12/30/23  Response to Previous Treatment: Patient with no complaints from previous session.  General  Reason for Referral: 58 year old male POD 0 lumbar spine surgery  Prior to Session Communication: Bedside nurse  Patient Position Received: Bed, 2 rail up, Alarm off, not on at start of session  Preferred Learning Style: auditory, verbal, visual, written    Subjective   Precautions:  Precautions  LE Weight Bearing Status: Weight Bearing as Tolerated  Precautions Comment: Review of spinal precautions with education provided.  Vital Signs:  Vital Signs  Patient Position: Lying    Objective   Pain:  Pain Assessment  Pain Assessment: 0-10  Pain Score: 0 - No pain  Cognition:  Cognition  Overall Cognitive Status: Within Functional  Limits  Attention: Within Functional Limits  Memory: Within Funtional Limits  Problem Solving: Within Functional Limits  Safety/Judgement: Within Functional Limits  Impulsive: Within functional limits  Postural Control:     Extremity/Trunk Assessments:    Activity Tolerance:  Activity Tolerance  Endurance: Tolerates 30 min exercise with multiple rests  Activity Tolerance Comments: Patient tolerates session well. Expressing desire to return home this am.  Treatments:  Therapeutic Exercise  Therapeutic Exercise Performed: Yes  Therapeutic Exercise Activity 1: Patient is instructed in and performed supine bilateral lower extremity ankle pumps, quad sets, gluteal sets, heel slides, abduction/adduction, and straight leg raises x 15reps x1 set.  Seated bilateral lower extremity toe raises, knee extension, and marches x 15 reps. Education provided to improve form and technique with review of home exercise program.         Bed Mobility  Bed Mobility: Yes  Bed Mobility 1  Bed Mobility 1: Supine to sitting, Rolling right  Level of Assistance 1: Close supervision  Bed Mobility Comments 1: Patient with good rolling techique. Verbal cues provided to improve safety awareness with review of spinal precautions.    Ambulation/Gait Training  Ambulation/Gait Training Performed: Yes  Ambulation/Gait Training 1  Surface 1: Level tile  Device 1: Rolling walker  Gait Support Devices: Gait belt  Assistance 1: Close supervision  Quality of Gait 1: Narrow base of support, Diminished heel strike, Decreased step length  Comments/Distance (ft) 1: Patient ambulated 200 feet with close supervision initially. CGA needed intermittently during the last 25 feet with c/o increased fatigue.  Transfers  Transfer: Yes  Transfer 1  Transfer From 1: Bed to, Stand to  Transfer to 1: Stand, Bed  Technique 1: Sit to stand, Stand to sit  Transfer Device 1: Walker  Transfer Level of Assistance 1: Contact guard, Close supervision  Trials/Comments 1: Patient  demonstrates ability to stand with cues provided for correct hand and feet placement to improve safety awareness.    Stairs  Stairs: Yes    Outcome Measures:  [facility] Basic Mobility  Turning from your back to your side while in a flat bed without using bedrails: None  Moving from lying on your back to sitting on the side of a flat bed without using bedrails: None  Moving to and from bed to chair (including a wheelchair): A little  Standing up from a chair using your arms (e.g. wheelchair or bedside chair): A little  To walk in hospital room: A little  Climbing 3-5 steps with railing: A little  Basic Mobility - Total Score: 20    Education Documentation  Handouts, taught by Katelin Barker PTA at 12/30/2023  1:32 PM.  Learner: Patient  Readiness: Acceptance  Method: Explanation, Demonstration  Response: Verbalizes Understanding, Demonstrated Understanding    Precautions, taught by Katelin Barker PTA at 12/30/2023  1:32 PM.  Learner: Patient  Readiness: Acceptance  Method: Explanation, Demonstration  Response: Verbalizes Understanding, Demonstrated Understanding    Body Mechanics, taught by Katelin Barker PTA at 12/30/2023  1:32 PM.  Learner: Patient  Readiness: Acceptance  Method: Explanation, Demonstration  Response: Verbalizes Understanding, Demonstrated Understanding    Home Exercise Program, taught by Katelin Barker PTA at 12/30/2023  1:32 PM.  Learner: Patient  Readiness: Acceptance  Method: Explanation, Demonstration  Response: Verbalizes Understanding, Demonstrated Understanding    Mobility Training, taught by Katelin Barker PTA at 12/30/2023  1:32 PM.  Learner: Patient  Readiness: Acceptance  Method: Explanation, Demonstration  Response: Verbalizes Understanding, Demonstrated Understanding    Education Comments  No comments found.        OP EDUCATION:  Outpatient Education  Individual(s) Educated: Patient  Education Provided: Fall Risk, Home Exercise Program, Posture  Patient/Caregiver Demonstrated Understanding:  yes  Patient Response to Education: Patient/Caregiver Performed Return Demonstration of Exercises/Activities    Encounter Problems       Encounter Problems (Active)       Mobility       STG - Patient will ambulate 350 feet independently (Progressing)       Start:  12/29/23    Expected End:  01/05/24            STG - Patient will ascend and descend a flight of stairs independently while holding one rail (Progressing)       Start:  12/29/23    Expected End:  01/05/24               Pain - Adult          Safety       Goal 1- pt will follow spinal precautions independently (Progressing)       Start:  12/29/23    Expected End:  01/05/24               Transfers       STG - Transfer from bed to chair independently (Progressing)       Start:  12/29/23    Expected End:  01/05/24            STG - Patient will perform bed mobility independently (Progressing)       Start:  12/29/23    Expected End:  01/05/24            STG - Patient will transfer sit to and from stand independently (Progressing)       Start:  12/29/23    Expected End:  01/05/24

## 2024-01-08 DIAGNOSIS — M48.062 SPINAL STENOSIS OF LUMBAR REGION WITH NEUROGENIC CLAUDICATION: ICD-10-CM

## 2024-01-12 ENCOUNTER — OFFICE VISIT (OUTPATIENT)
Dept: PRIMARY CARE | Facility: CLINIC | Age: 59
End: 2024-01-12
Payer: COMMERCIAL

## 2024-01-12 VITALS
BODY MASS INDEX: 26.64 KG/M2 | WEIGHT: 191 LBS | SYSTOLIC BLOOD PRESSURE: 121 MMHG | DIASTOLIC BLOOD PRESSURE: 76 MMHG | TEMPERATURE: 98 F

## 2024-01-12 DIAGNOSIS — I80.8 SUPERFICIAL PHLEBITIS OF ARM: Primary | ICD-10-CM

## 2024-01-12 PROCEDURE — 99213 OFFICE O/P EST LOW 20 MIN: CPT | Performed by: FAMILY MEDICINE

## 2024-01-12 PROCEDURE — 3074F SYST BP LT 130 MM HG: CPT | Performed by: FAMILY MEDICINE

## 2024-01-12 PROCEDURE — 3078F DIAST BP <80 MM HG: CPT | Performed by: FAMILY MEDICINE

## 2024-01-12 PROCEDURE — 1036F TOBACCO NON-USER: CPT | Performed by: FAMILY MEDICINE

## 2024-01-12 NOTE — PROGRESS NOTES
Subjective   Patient ID: [id number]     Sharath Weiss is a 58 y.o. male who presents for Hand Injury.    HPI  9 days ago Sharath noticed 4 inches of a painful vein raised fro over dorsla left wrist where he recently had an IV  Had a back fusion a few weeks ago and getting up to urinate during the night he bumped his left hand on a counter and noticed pain and  a blue bump in the morning     Review of Systems  ID-no fever  PULM-no cough or shortness of breath  GI- hx of ulcers    Objective     /76 (BP Location: Right arm, Patient Position: Sitting, BP Cuff Size: Adult)   Temp 36.7 °C (98 °F) (Oral)   Wt 86.6 kg (191 lb)   BMI 26.64 kg/m²      Physical Exam  Lymph node survey negative  EXTREMS-no proximal streaking  or lymphadenopathy;  mildly tender palpable vein approximately four inches in length dorsal left wrist without redness; proximal arm and forearm not tender and without deep cords  Neck-supple without lymphadenopathy or thyromegaly; no carotid bruits  Heart- regular rate and rhythm, normal s1 and s2 without murmur or gallop  Lungs-clear to auscultation    Assessment/Plan     Problem List Items Addressed This Visit       Superficial phlebitis of arm - Primary     Use a heating pad on the area three times daily for the next two weeks. Call for any worsening.    Buster Rodriguez MD

## 2024-01-15 ENCOUNTER — APPOINTMENT (OUTPATIENT)
Dept: PRIMARY CARE | Facility: CLINIC | Age: 59
End: 2024-01-15
Payer: COMMERCIAL

## 2024-01-17 ENCOUNTER — OFFICE VISIT (OUTPATIENT)
Dept: ORTHOPEDIC SURGERY | Facility: CLINIC | Age: 59
End: 2024-01-17
Payer: COMMERCIAL

## 2024-01-17 ENCOUNTER — ANCILLARY PROCEDURE (OUTPATIENT)
Dept: RADIOLOGY | Facility: CLINIC | Age: 59
End: 2024-01-17
Payer: COMMERCIAL

## 2024-01-17 DIAGNOSIS — M48.062 NEUROGENIC CLAUDICATION DUE TO LUMBAR SPINAL STENOSIS: Primary | ICD-10-CM

## 2024-01-17 DIAGNOSIS — M48.062 SPINAL STENOSIS OF LUMBAR REGION WITH NEUROGENIC CLAUDICATION: ICD-10-CM

## 2024-01-17 DIAGNOSIS — M48.061 SPINAL STENOSIS OF LUMBAR REGION WITHOUT NEUROGENIC CLAUDICATION: ICD-10-CM

## 2024-01-17 PROCEDURE — 99024 POSTOP FOLLOW-UP VISIT: CPT | Performed by: PHYSICIAN ASSISTANT

## 2024-01-17 PROCEDURE — 72100 X-RAY EXAM L-S SPINE 2/3 VWS: CPT

## 2024-01-17 PROCEDURE — 72100 X-RAY EXAM L-S SPINE 2/3 VWS: CPT | Performed by: RADIOLOGY

## 2024-01-17 PROCEDURE — 1036F TOBACCO NON-USER: CPT | Performed by: PHYSICIAN ASSISTANT

## 2024-01-17 RX ORDER — PREDNISONE 20 MG/1
20 TABLET ORAL DAILY
Qty: 7 TABLET | Refills: 0 | Status: SHIPPED
Start: 2024-01-17 | End: 2024-02-20 | Stop reason: WASHOUT

## 2024-01-17 ASSESSMENT — PAIN DESCRIPTION - DESCRIPTORS: DESCRIPTORS: ACHING

## 2024-01-17 ASSESSMENT — PAIN SCALES - GENERAL: PAINLEVEL_OUTOF10: 4

## 2024-01-17 ASSESSMENT — PAIN - FUNCTIONAL ASSESSMENT: PAIN_FUNCTIONAL_ASSESSMENT: 0-10

## 2024-01-17 NOTE — PROGRESS NOTES
Sharath is 3 weeks postop from a L2-4 XLIF with perc screws performed Dr. Quiros on 12/29.    He is gradually recovering from surgery.  He continues to have right-sided groin pain with numbness just above his right testicle.  He is having intermittent residual right leg radiculopathy, mostly burning in his calf.  He is ambulating daily without issues.  He did not take any pain medication or muscle relaxers after leaving the hospital.    On exam, normal gait without assistive devices.  Full strength of lower extremities bilaterally.  Incisions healing well, no evidence infection.    I personally reviewed x-rays of the lumbar spine completed today.  There is no evidence of acute fracture or hardware failure.  Stable alignment of lumbar fusion.    He can continue to increase activities as tolerated, he should start to bend lift and twist.  No lifting greater than 25 pounds for another 3 weeks.  A prescription for prednisone 20 mg was sent to his pharmacy for residual inflammation.  He does not need refills of any other medications.  He will see me back in 3 months with repeat x-rays, lumbar AP and lateral only.    **This note was dictated using speech recognition software and was not corrected for spelling or grammatical errors**

## 2024-01-18 ENCOUNTER — OFFICE (OUTPATIENT)
Dept: URBAN - METROPOLITAN AREA CLINIC 26 | Facility: CLINIC | Age: 59
End: 2024-01-18

## 2024-01-18 VITALS
DIASTOLIC BLOOD PRESSURE: 76 MMHG | HEIGHT: 71 IN | HEART RATE: 89 BPM | SYSTOLIC BLOOD PRESSURE: 147 MMHG | WEIGHT: 194 LBS

## 2024-01-18 DIAGNOSIS — K21.9 GASTRO-ESOPHAGEAL REFLUX DISEASE WITHOUT ESOPHAGITIS: ICD-10-CM

## 2024-01-18 DIAGNOSIS — R10.13 EPIGASTRIC PAIN: ICD-10-CM

## 2024-01-18 DIAGNOSIS — R13.10 DYSPHAGIA, UNSPECIFIED: ICD-10-CM

## 2024-01-18 DIAGNOSIS — F19.90 OTHER PSYCHOACTIVE SUBSTANCE USE, UNSPECIFIED, UNCOMPLICATED: ICD-10-CM

## 2024-01-18 PROCEDURE — 99213 OFFICE O/P EST LOW 20 MIN: CPT | Performed by: NURSE PRACTITIONER

## 2024-01-18 RX ORDER — PANTOPRAZOLE 40 MG/1
TABLET, DELAYED RELEASE ORAL
Qty: 30 | Refills: 1 | Status: ACTIVE

## 2024-01-29 ENCOUNTER — OFFICE VISIT (OUTPATIENT)
Dept: DERMATOLOGY | Facility: CLINIC | Age: 59
End: 2024-01-29
Payer: COMMERCIAL

## 2024-01-29 DIAGNOSIS — L93.0 DISCOID LUPUS ERYTHEMATOSUS: ICD-10-CM

## 2024-01-29 DIAGNOSIS — L21.9 SEBORRHEIC DERMATITIS: Primary | ICD-10-CM

## 2024-01-29 PROCEDURE — 1036F TOBACCO NON-USER: CPT

## 2024-01-29 PROCEDURE — 11900 INJECT SKIN LESIONS </W 7: CPT

## 2024-01-29 PROCEDURE — 99213 OFFICE O/P EST LOW 20 MIN: CPT

## 2024-01-29 RX ORDER — HYDROXYCHLOROQUINE SULFATE 200 MG/1
200 TABLET, FILM COATED ORAL DAILY
Qty: 30 TABLET | Refills: 2 | Status: SHIPPED | OUTPATIENT
Start: 2024-01-29

## 2024-01-29 NOTE — PROGRESS NOTES
Subjective   HPI: Sharath Weiss is a 58 y.o. male is here for follow-up evaluation and treatment of discoid lupus of scalp and seborrheic dermatitis.  Patient offers no complaints today.  He has been taking the Plaquenil as prescribed and is not experiencing any side effects.  His dry eyes have significantly improved and he is only having to use over-the-counter eyedrops once every other day.    ROS: No other skin or systemic complaints other than what is documented elsewhere in the note.    ALLERGIES: Vancomycin    SOCIAL:  reports that he quit smoking about 6 years ago. His smoking use included cigarettes. He has never used smokeless tobacco. He reports current alcohol use of about 12.0 standard drinks of alcohol per week. He reports that he does not use drugs.    Objective       Assessment/Plan   1. Discoid lupus erythematosus  Scalp    Related Medications  triamcinolone acetonide (Kenalog) injection 10 mg      hydroxychloroquine (PlaqueniL) 200 mg tablet  Take 1 tablet (200 mg) by mouth once daily.         FOLLOW UP: 6 months    The patient was encouraged to contact me with any further questions or concerns.  Hannah Holman PA-C  1/29/2024

## 2024-02-02 DIAGNOSIS — M48.061 SPINAL STENOSIS OF LUMBAR REGION, UNSPECIFIED WHETHER NEUROGENIC CLAUDICATION PRESENT: ICD-10-CM

## 2024-02-05 DIAGNOSIS — M16.11 PRIMARY OSTEOARTHRITIS OF RIGHT HIP: ICD-10-CM

## 2024-02-05 DIAGNOSIS — M25.551 RIGHT HIP PAIN: ICD-10-CM

## 2024-02-07 ENCOUNTER — OFFICE VISIT (OUTPATIENT)
Dept: ORTHOPEDIC SURGERY | Facility: CLINIC | Age: 59
End: 2024-02-07
Payer: COMMERCIAL

## 2024-02-07 ENCOUNTER — HOSPITAL ENCOUNTER (OUTPATIENT)
Dept: RADIOLOGY | Facility: CLINIC | Age: 59
Discharge: HOME | End: 2024-02-07
Payer: COMMERCIAL

## 2024-02-07 DIAGNOSIS — M48.061 SPINAL STENOSIS OF LUMBAR REGION, UNSPECIFIED WHETHER NEUROGENIC CLAUDICATION PRESENT: ICD-10-CM

## 2024-02-07 DIAGNOSIS — M54.16 LUMBAR RADICULOPATHY: Primary | ICD-10-CM

## 2024-02-07 PROCEDURE — 72100 X-RAY EXAM L-S SPINE 2/3 VWS: CPT

## 2024-02-07 PROCEDURE — 72100 X-RAY EXAM L-S SPINE 2/3 VWS: CPT | Performed by: RADIOLOGY

## 2024-02-07 PROCEDURE — 99212 OFFICE O/P EST SF 10 MIN: CPT | Performed by: ORTHOPAEDIC SURGERY

## 2024-02-07 PROCEDURE — 1036F TOBACCO NON-USER: CPT | Performed by: ORTHOPAEDIC SURGERY

## 2024-02-07 ASSESSMENT — PAIN SCALES - GENERAL: PAINLEVEL_OUTOF10: 6

## 2024-02-07 ASSESSMENT — PAIN - FUNCTIONAL ASSESSMENT: PAIN_FUNCTIONAL_ASSESSMENT: 0-10

## 2024-02-07 ASSESSMENT — PAIN DESCRIPTION - DESCRIPTORS: DESCRIPTORS: ACHING;RADIATING

## 2024-02-07 NOTE — PROGRESS NOTES
3-month status post L2-4 XLIF.  Doing well.  Complains of some right groin and thigh pain, worse with ambulation.    On exam no focal deficits.  Positive hip impingement signs.    X-rays of the lumbar spine show stable alignment.  No evidence of hardware failure or loosening.    Hip x-rays demonstrate changes consistent with cam impingement.    From a spine standpoint he is doing well, he can continue activities as tolerated.  He does not require any further follow-up at this point.    He is scheduled with Dr. Pantoja for evaluation of his right hip pain.    *This note was dictated using speech recognition software and was not corrected for spelling or grammatical errors*

## 2024-02-16 DIAGNOSIS — M65.311 TRIGGER THUMB OF RIGHT HAND: ICD-10-CM

## 2024-02-20 ENCOUNTER — OFFICE VISIT (OUTPATIENT)
Dept: ORTHOPEDIC SURGERY | Facility: CLINIC | Age: 59
End: 2024-02-20
Payer: COMMERCIAL

## 2024-02-20 VITALS — BODY MASS INDEX: 26.6 KG/M2 | WEIGHT: 190 LBS | HEIGHT: 71 IN

## 2024-02-20 DIAGNOSIS — M25.551 RIGHT HIP PAIN: ICD-10-CM

## 2024-02-20 DIAGNOSIS — M16.11 PRIMARY OSTEOARTHRITIS OF RIGHT HIP: ICD-10-CM

## 2024-02-20 DIAGNOSIS — M25.551 HIP PAIN, ACUTE, RIGHT: ICD-10-CM

## 2024-02-20 PROCEDURE — 1036F TOBACCO NON-USER: CPT | Performed by: ORTHOPAEDIC SURGERY

## 2024-02-20 PROCEDURE — 99203 OFFICE O/P NEW LOW 30 MIN: CPT | Performed by: ORTHOPAEDIC SURGERY

## 2024-02-20 RX ORDER — ONDANSETRON 4 MG/1
4 TABLET, ORALLY DISINTEGRATING ORAL EVERY 8 HOURS PRN
COMMUNITY
Start: 2023-12-29 | End: 2024-03-12 | Stop reason: WASHOUT

## 2024-02-20 RX ORDER — PANTOPRAZOLE SODIUM 40 MG/1
TABLET, DELAYED RELEASE ORAL
COMMUNITY
Start: 2024-01-18

## 2024-02-20 NOTE — PROGRESS NOTES
58-year-old is seen with right hip pain.  He has been having an intermittent severe sharp shooting pain along the anterior aspect of the right hip and groin.  He is a  and does mostly desk type work.  He has had lumbar spine fusion December 2023 and recently saw Dr. Quiros and was advised that the fusion has healed well.  He had a intra-articular hip injection under ultrasound last August without significant relief.  He has used ibuprofen and Tylenol.  He is applied ice and heat.  He is done physical therapy.  He has difficulty going up and down stairs and getting in and out of a car and he has difficulty sleeping.  He occasionally has numbness radiating along the right hip and thigh.    Pleasant in no acute distress.  Walks with mildly antalgic gait.  There is decreased flexibility lumbosacral spine.  There is pain with range of motion of the right hip and tenderness along the anterior aspect of the hip.  Right hip flexes to 90 degrees with decreased internal and external rotation.  Left hip flexes 95 degrees good internal/external rotation without pain.    AP pelvis and AP/lateral x-rays of the right hip are personally reviewed and the joint spaces are maintained there is no obvious acute bony abnormality.    A discussion about his pain was done.  He will be sent for an MRI to evaluate for avascular necrosis or insufficiency type fracture as the etiology of pain.  MRI will also allow for imaging of the soft tissues around the hip.  He will continue with the exercise program and ibuprofen and Tylenol and avoidance of aggravating activities.

## 2024-02-21 ENCOUNTER — OFFICE (OUTPATIENT)
Dept: URBAN - METROPOLITAN AREA CLINIC 26 | Facility: CLINIC | Age: 59
End: 2024-02-21

## 2024-02-21 VITALS
HEIGHT: 71 IN | WEIGHT: 195 LBS | DIASTOLIC BLOOD PRESSURE: 76 MMHG | SYSTOLIC BLOOD PRESSURE: 128 MMHG | HEART RATE: 78 BPM | TEMPERATURE: 98.2 F

## 2024-02-21 DIAGNOSIS — K57.90 DIVERTICULOSIS OF INTESTINE, PART UNSPECIFIED, WITHOUT PERFO: ICD-10-CM

## 2024-02-21 DIAGNOSIS — R13.10 DYSPHAGIA, UNSPECIFIED: ICD-10-CM

## 2024-02-21 DIAGNOSIS — R10.13 EPIGASTRIC PAIN: ICD-10-CM

## 2024-02-21 PROCEDURE — 99213 OFFICE O/P EST LOW 20 MIN: CPT | Performed by: INTERNAL MEDICINE

## 2024-02-29 ENCOUNTER — PATIENT MESSAGE (OUTPATIENT)
Dept: ORTHOPEDIC SURGERY | Facility: CLINIC | Age: 59
End: 2024-02-29
Payer: COMMERCIAL

## 2024-03-01 ENCOUNTER — LAB (OUTPATIENT)
Dept: LAB | Facility: LAB | Age: 59
End: 2024-03-01
Payer: COMMERCIAL

## 2024-03-01 ENCOUNTER — HOSPITAL ENCOUNTER (OUTPATIENT)
Facility: CLINIC | Age: 59
Setting detail: OUTPATIENT SURGERY
Discharge: HOME | End: 2024-03-01
Attending: ORTHOPAEDIC SURGERY | Admitting: ORTHOPAEDIC SURGERY
Payer: COMMERCIAL

## 2024-03-01 VITALS
OXYGEN SATURATION: 100 % | SYSTOLIC BLOOD PRESSURE: 152 MMHG | WEIGHT: 195.55 LBS | TEMPERATURE: 98.2 F | RESPIRATION RATE: 18 BRPM | HEIGHT: 71 IN | BODY MASS INDEX: 27.38 KG/M2 | DIASTOLIC BLOOD PRESSURE: 82 MMHG | HEART RATE: 71 BPM

## 2024-03-01 DIAGNOSIS — F51.01 PRIMARY INSOMNIA: ICD-10-CM

## 2024-03-01 DIAGNOSIS — I10 HYPERTENSION, ESSENTIAL: ICD-10-CM

## 2024-03-01 DIAGNOSIS — Z02.83 ENCOUNTER FOR DRUG SCREENING: ICD-10-CM

## 2024-03-01 DIAGNOSIS — M25.551 HIP PAIN, ACUTE, RIGHT: ICD-10-CM

## 2024-03-01 DIAGNOSIS — M65.311 TRIGGER THUMB OF RIGHT HAND: Primary | ICD-10-CM

## 2024-03-01 DIAGNOSIS — Z51.81 ENCOUNTER FOR THERAPEUTIC DRUG LEVEL MONITORING: ICD-10-CM

## 2024-03-01 DIAGNOSIS — M25.551 HIP PAIN, ACUTE, RIGHT: Primary | ICD-10-CM

## 2024-03-01 LAB
ALBUMIN SERPL BCP-MCNC: 4.7 G/DL (ref 3.4–5)
ALP SERPL-CCNC: 54 U/L (ref 33–120)
ALT SERPL W P-5'-P-CCNC: 14 U/L (ref 10–52)
AMPHETAMINES UR QL SCN: NORMAL
ANION GAP SERPL CALC-SCNC: 15 MMOL/L (ref 10–20)
AST SERPL W P-5'-P-CCNC: 15 U/L (ref 9–39)
BARBITURATES UR QL SCN: NORMAL
BASOPHILS # BLD AUTO: 0.06 X10*3/UL (ref 0–0.1)
BASOPHILS NFR BLD AUTO: 1 %
BILIRUB SERPL-MCNC: 0.4 MG/DL (ref 0–1.2)
BUN SERPL-MCNC: 12 MG/DL (ref 6–23)
BZE UR QL SCN: NORMAL
CALCIUM SERPL-MCNC: 9.7 MG/DL (ref 8.6–10.6)
CANNABINOIDS UR QL SCN: NORMAL
CHLORIDE SERPL-SCNC: 103 MMOL/L (ref 98–107)
CO2 SERPL-SCNC: 28 MMOL/L (ref 21–32)
CREAT SERPL-MCNC: 0.88 MG/DL (ref 0.5–1.3)
CREAT UR-MCNC: 49.1 MG/DL (ref 20–370)
CRP SERPL-MCNC: 0.25 MG/DL
EGFRCR SERPLBLD CKD-EPI 2021: >90 ML/MIN/1.73M*2
EOSINOPHIL # BLD AUTO: 0.06 X10*3/UL (ref 0–0.7)
EOSINOPHIL NFR BLD AUTO: 1 %
ERYTHROCYTE [DISTWIDTH] IN BLOOD BY AUTOMATED COUNT: 11.9 % (ref 11.5–14.5)
ERYTHROCYTE [SEDIMENTATION RATE] IN BLOOD BY WESTERGREN METHOD: <1 MM/H (ref 0–20)
GLUCOSE SERPL-MCNC: 130 MG/DL (ref 74–99)
HCT VFR BLD AUTO: 40.4 % (ref 41–52)
HGB BLD-MCNC: 13.1 G/DL (ref 13.5–17.5)
IMM GRANULOCYTES # BLD AUTO: 0.01 X10*3/UL (ref 0–0.7)
IMM GRANULOCYTES NFR BLD AUTO: 0.2 % (ref 0–0.9)
LYMPHOCYTES # BLD AUTO: 1.28 X10*3/UL (ref 1.2–4.8)
LYMPHOCYTES NFR BLD AUTO: 20.6 %
MCH RBC QN AUTO: 29.7 PG (ref 26–34)
MCHC RBC AUTO-ENTMCNC: 32.4 G/DL (ref 32–36)
MCV RBC AUTO: 92 FL (ref 80–100)
MONOCYTES # BLD AUTO: 0.6 X10*3/UL (ref 0.1–1)
MONOCYTES NFR BLD AUTO: 9.7 %
NEUTROPHILS # BLD AUTO: 4.2 X10*3/UL (ref 1.2–7.7)
NEUTROPHILS NFR BLD AUTO: 67.5 %
NRBC BLD-RTO: 0 /100 WBCS (ref 0–0)
PCP UR QL SCN: NORMAL
PLATELET # BLD AUTO: 258 X10*3/UL (ref 150–450)
POTASSIUM SERPL-SCNC: 4.1 MMOL/L (ref 3.5–5.3)
PROT SERPL-MCNC: 6.5 G/DL (ref 6.4–8.2)
RBC # BLD AUTO: 4.41 X10*6/UL (ref 4.5–5.9)
SODIUM SERPL-SCNC: 142 MMOL/L (ref 136–145)
WBC # BLD AUTO: 6.2 X10*3/UL (ref 4.4–11.3)

## 2024-03-01 PROCEDURE — 2500000005 HC RX 250 GENERAL PHARMACY W/O HCPCS: Performed by: ORTHOPAEDIC SURGERY

## 2024-03-01 PROCEDURE — 80354 DRUG SCREENING FENTANYL: CPT

## 2024-03-01 PROCEDURE — 80365 DRUG SCREENING OXYCODONE: CPT

## 2024-03-01 PROCEDURE — 85652 RBC SED RATE AUTOMATED: CPT

## 2024-03-01 PROCEDURE — 3600000008 HC OR TIME - EACH INCREMENTAL 1 MINUTE - PROCEDURE LEVEL THREE: Performed by: ORTHOPAEDIC SURGERY

## 2024-03-01 PROCEDURE — 80307 DRUG TEST PRSMV CHEM ANLYZR: CPT

## 2024-03-01 PROCEDURE — 80361 OPIATES 1 OR MORE: CPT

## 2024-03-01 PROCEDURE — 80346 BENZODIAZEPINES1-12: CPT

## 2024-03-01 PROCEDURE — 80358 DRUG SCREENING METHADONE: CPT

## 2024-03-01 PROCEDURE — 80368 SEDATIVE HYPNOTICS: CPT

## 2024-03-01 PROCEDURE — 80373 DRUG SCREENING TRAMADOL: CPT

## 2024-03-01 PROCEDURE — 86140 C-REACTIVE PROTEIN: CPT

## 2024-03-01 PROCEDURE — 85025 COMPLETE CBC W/AUTO DIFF WBC: CPT

## 2024-03-01 PROCEDURE — 26055 INCISE FINGER TENDON SHEATH: CPT | Performed by: ORTHOPAEDIC SURGERY

## 2024-03-01 PROCEDURE — 36415 COLL VENOUS BLD VENIPUNCTURE: CPT

## 2024-03-01 PROCEDURE — 7100000010 HC PHASE TWO TIME - EACH INCREMENTAL 1 MINUTE: Performed by: ORTHOPAEDIC SURGERY

## 2024-03-01 PROCEDURE — 80053 COMPREHEN METABOLIC PANEL: CPT

## 2024-03-01 PROCEDURE — 3600000003 HC OR TIME - INITIAL BASE CHARGE - PROCEDURE LEVEL THREE: Performed by: ORTHOPAEDIC SURGERY

## 2024-03-01 PROCEDURE — 2500000004 HC RX 250 GENERAL PHARMACY W/ HCPCS (ALT 636 FOR OP/ED): Performed by: ORTHOPAEDIC SURGERY

## 2024-03-01 PROCEDURE — A4217 STERILE WATER/SALINE, 500 ML: HCPCS | Performed by: ORTHOPAEDIC SURGERY

## 2024-03-01 PROCEDURE — 82570 ASSAY OF URINE CREATININE: CPT

## 2024-03-01 PROCEDURE — 7100000009 HC PHASE TWO TIME - INITIAL BASE CHARGE: Performed by: ORTHOPAEDIC SURGERY

## 2024-03-01 RX ORDER — HYDROCODONE BITARTRATE AND ACETAMINOPHEN 5; 325 MG/1; MG/1
1 TABLET ORAL EVERY 6 HOURS PRN
Qty: 12 TABLET | Refills: 0 | Status: SHIPPED | OUTPATIENT
Start: 2024-03-01

## 2024-03-01 RX ORDER — SODIUM CHLORIDE 0.9 G/100ML
IRRIGANT IRRIGATION AS NEEDED
Status: DISCONTINUED | OUTPATIENT
Start: 2024-03-01 | End: 2024-03-01 | Stop reason: HOSPADM

## 2024-03-01 RX ORDER — LIDOCAINE HYDROCHLORIDE 10 MG/ML
INJECTION INFILTRATION; PERINEURAL AS NEEDED
Status: DISCONTINUED | OUTPATIENT
Start: 2024-03-01 | End: 2024-03-01 | Stop reason: HOSPADM

## 2024-03-01 RX ORDER — BUPIVACAINE HYDROCHLORIDE 5 MG/ML
INJECTION, SOLUTION PERINEURAL AS NEEDED
Status: DISCONTINUED | OUTPATIENT
Start: 2024-03-01 | End: 2024-03-01 | Stop reason: HOSPADM

## 2024-03-01 ASSESSMENT — PAIN SCALES - GENERAL
PAINLEVEL_OUTOF10: 1
PAINLEVEL_OUTOF10: 1
PAINLEVEL_OUTOF10: 0 - NO PAIN

## 2024-03-01 ASSESSMENT — COLUMBIA-SUICIDE SEVERITY RATING SCALE - C-SSRS
1. IN THE PAST MONTH, HAVE YOU WISHED YOU WERE DEAD OR WISHED YOU COULD GO TO SLEEP AND NOT WAKE UP?: NO
2. HAVE YOU ACTUALLY HAD ANY THOUGHTS OF KILLING YOURSELF?: NO
6. HAVE YOU EVER DONE ANYTHING, STARTED TO DO ANYTHING, OR PREPARED TO DO ANYTHING TO END YOUR LIFE?: NO

## 2024-03-01 ASSESSMENT — PAIN - FUNCTIONAL ASSESSMENT
PAIN_FUNCTIONAL_ASSESSMENT: 0-10

## 2024-03-01 NOTE — PATIENT COMMUNICATION
Patient called this morning concerned about his right hip MRI results and a procedure today with Dr. Vanegas. He is scheduled for a trigger finger procedure with Dr. Vaneags today, and is inquiring whether antibiotics are needed due to a finding on his MRI. Right hip MRI findings with concern for possible acetabulum osteomyelitis vs bone reaction/edema. Patient states that he discussed with Dr. Vanegas, and he advised antibiotics are not needed, but advised to also reach out to Dr. Colby.      MRI results and recent lab work from December including normal WBC and CRP reviewed per myself and Dr. LoPresti. Dr. LoPresti advises that antibiotics are not needed for trigger finger procedure with Dr. Vanegas after review of hip MRI and recent labs, but that ultimately that is up to Dr. Vanegas.      Discussed this with patient. Advised follow up with Dr. Colby. I have also ordered CBC w diff, CRP and sed rate. Patient denies fever/chills or recent illness.

## 2024-03-01 NOTE — H&P
[facility] Department of Orthopaedic Surgery   Surgical History & Physical >30 Days    Reason for Surgery: R trigger thumb   Planned Procedure: R trigger thumb release    History & Physical Reviewed:  I have reviewed the History and Physical dated 23. Relevant findings and updates are noted below:  No significant changes.     Past Medical History:   Diagnosis Date    BPH (benign prostatic hyperplasia)     Cervical disc disease     Discoid lupus erythematosus     Diverticulitis     s/p bowel resection     GERD (gastroesophageal reflux disease)     History of sepsis      - left knee    Hyperlipidemia     Hypertension     Insomnia     Irritable bowel syndrome     Lichen planus     Lumbar stenosis     Osteoarthritis     Spinal stenosis     Superficial phlebitis of arm     Tinnitus     Trigger finger of right thumb     Trigger finger of thumb     Urachal cyst     Vitamin D deficiency      Past Surgical History:   Procedure Laterality Date    ABSCESS DRAINAGE      abdominal    ANTERIOR CRUCIATE LIGAMENT REPAIR  2015    Primary Repair Of Knee Ligament Cruciate Anterior    BOWEL RESECTION      COLONOSCOPY  2022    Complete Colonoscopy    ELBOW FRACTURE SURGERY Left     KNEE ARTHROSCOPY W/ DEBRIDEMENT  2015    Knee Arthroscopy (Therapeutic)    KNEE ARTHROSCOPY W/ HARDWARE REMOVAL Left         LUMBAR FUSION  2015    Lumbar Vertebral Fusion    LUMBAR LAMINECTOMY  2015    Laminectomy Lumbar    OTHER SURGICAL HISTORY  2020    Epidural steroid injection    OTHER SURGICAL HISTORY  2018    Arthroscopy Shoulder Right x 2    SHOULDER ARTHROSCOPY Left     x 3    SPINAL FUSION  2023    UPPER GASTROINTESTINAL ENDOSCOPY       Social History     Tobacco Use    Smoking status: Former     Packs/day: .25     Types: Cigarettes     Quit date: 2018     Years since quittin.1     Passive exposure: Never    Smokeless tobacco: Never    Tobacco comments:     Pt denies  any illness in past 30 days.      Denies SOB with stairs or activity.     Denies need for assist devices for ambulation.     Denies any personal or family reactions to anesthesia.   Substance Use Topics    Alcohol use: Yes     Alcohol/week: 10.0 standard drinks of alcohol     Types: 10 Cans of beer per week     Prior to Admission medications    Medication Sig Start Date End Date Taking? Authorizing Provider   ciclopirox 1 % shampoo Wet hair and apply shampoo to scalp. Lather and leave on for 3 minutes. Rinse. Do this twice a week at night. 11/9/23  Yes Hannah Holman PA-C   fluticasone (Flonase Sensimist) 27.5 mcg/actuation nasal spray Administer 1 spray into each nostril once daily as needed for rhinitis.   Yes Historical Provider, MD   hydroxychloroquine (PlaqueniL) 200 mg tablet Take 1 tablet (200 mg) by mouth once daily. 1/29/24  Yes Hannah Holman PA-C   ketoconazole (NIZOral) 2 % shampoo Apply 1 Application topically 2 times a week.   Yes Historical Provider, MD   lido-diphen-Maalox 1:1:1 Magic Mouthwash Swish and swallow 30 mL 3 times a day as needed (severe bdominal pain). 10/20/23  Yes Buster Barney, DO   lisinopril 10 mg tablet Take 1 tablet (10 mg) by mouth once daily. 10/4/23 9/28/24 Yes Buster Barney DO   methocarbamol (Robaxin) 500 mg tablet Take 1-2 tabs every 8 hours as needed for spasms 12/29/23  Yes Nallely Callahan PA-C   pantoprazole (ProtoNix) 40 mg EC tablet  1/18/24  Yes Historical Provider, MD   rosuvastatin (Crestor) 10 mg tablet Take 1 tablet (10 mg) by mouth once daily. 10/4/23  Yes Buster Barney DO   tacrolimus (Protopic) 0.1 % ointment in the morning and at bedtime. APPLY AND GENTLY MASSAGE INTO AFFECTED AREA(S) 1/13/22  Yes Historical Provider, MD   zolpidem (Ambien) 10 mg tablet Take 1 tablet (10 mg) by mouth as needed at bedtime for sleep.  Patient taking differently: Take 1 tablet (10 mg) by mouth once daily at bedtime. 10/4/23 4/1/24 Yes Buster Barney,    azelastine (Astelin)  137 mcg (0.1 %) nasal spray Administer 1 spray into each nostril in the morning and 1 spray before bedtime. Use in each nostril as directed. 3/14/23 10/25/23  Buster Barney DO   ondansetron ODT (Zofran-ODT) 4 mg disintegrating tablet Take 1 tablet (4 mg) by mouth every 8 hours if needed. 12/29/23   Historical Provider, MD     Allergies   Allergen Reactions    Vancomycin Nausea/vomiting, Anxiety and Tinnitus     Other reaction(s): Intolerance   Tinnitus and shaking       Review of Systems:   Gen: Denies recent weight loss  Neuro: Denies recent confusion  Ophtho: Denies changes in vision  ENT: Denies changes in hearing  Endo: Denies weight loss/weight gain  CV: Denies chest pain  Resp: Denies shortness of breath  GI: Denies melena/hematochezia  : Denies painful urination  MSK: Per above HPI  Heme: No abnormal bleeding  Psych: Denies hallucinations      ERAS patient?: No    COVID-19 Risk Consent:   Surgeon has reviewed the key risks related to leonie COVID-19 and subsequent sequelae.       Alex Gonzáles MD 03/01/24

## 2024-03-01 NOTE — OP NOTE
Right Thumb Trigger Release / 25 Minutes (R) Operative Note     Date: 3/1/2024  OR Location: [facility] OR    Name: Sharath Weiss, : 1965, Age: 58 y.o., MRN: [id number], Sex: male    Diagnosis  Pre-op Diagnosis     * Trigger thumb of right hand [M65.311] Post-op Diagnosis     * Trigger thumb of right hand [M65.311]     Procedures  Right Thumb Trigger Release / 25 Minutes  36467 - LA TENDON SHEATH INCISION      Surgeons      * Yovanny Vanegas - Primary    Resident/Fellow/Other Assistant:  Surgeon(s) and Role: Alex Gonzáles MD    Procedure Summary  Anesthesia: Local  ASA: ASA status not filed in the log.  Anesthesia Staff: No anesthesia staff entered.  Estimated Blood Loss: 0 mL  Intra-op Medications: Administrations occurring from 1245 to 1330 on 24:  * No intraprocedure medications in log *      Intraprocedure I/O Totals       None           Specimen: No specimens collected     Staff:   Circulator: Helga Westfall RN; Allie Lau RN  Scrub Person: Karen Lui RN; Ibrahima Abdullahi         Drains and/or Catheters: * None in log *    Tourniquet Times:     Total Tourniquet Time Documented:  Arm - Upper (Right) - 7 minutes  Total: Arm - Upper (Right) - 7 minutes      Implants:     Findings:   Right thumb trigger finger with hypertrophic A1 pulley      Indications: Sharath Weiss is an 58 y.o. male who is having surgery for Trigger thumb of right hand [M65.311].      The patient was seen in the preoperative area. The risks, benefits, complications, treatment options, non-operative alternatives, expected recovery and outcomes were discussed with the patient. The possibilities of reaction to medication, pulmonary aspiration, injury to surrounding structures, bleeding, recurrent infection, the need for additional procedures, failure to diagnose a condition, and creating a complication requiring transfusion or operation were discussed with the patient. The patient concurred with the proposed plan, giving  informed consent.  The site of surgery was properly noted/marked if necessary per policy. The patient has been actively warmed in preoperative area. Preoperative antibiotics are not indicated. Venous thrombosis prophylaxis are not indicated.    Procedure Details:   58-year-old gentleman with symptomatic trigger finger right thumb presents today for trigger thumb release.  Preoperatively the right thumb was identified and marked for surgery per informed consent process was completed.    Patient was brought to the operating room placed supine on the operating table.  Timeout procedure performed to verify correct patient procedure operative site.  Local anesthetic administered at the base of the right thumb.  Right upper extremity prepped and draped usual sterile fashion.  Limb was exsanguinated and tourniquet was inflated to 2 and 50 mmHg.    We made a transverse incision at the thumb MP joint flexion crease.  We bluntly dissected down to expose the flexor tendon sheath.  Tractors were placed to protect the neurovascular bundles.  We identified the A1 pulley which was thickened and hypertrophic and very prominent.  We made an incision releasing the A1 pulley.  We then excised a central segment of the A1 pulley to minimize the bulk and the soft tissue prominence.  The patient was able to demonstrate full active thumb flexion without further triggering.  The wound was irrigated and closed after fashion.  Bandage was applied.  Tourniquet was deflated.  Patient was transferred back to the recovery in stable condition.    Postoperatively he will be discharged home once comfortable.  He can remove his bandage on postop day #4 and begin wound care with gentle activities as instructed.  Return to clinic in 2 weeks for wound check and advancement of activities.        Complications:  None; patient tolerated the procedure well.    Disposition: PACU - hemodynamically stable.  Condition: stable         Additional Details:       Attending Attestation: I was present and scrubbed for the entire procedure.    Yovanny Vanegas  Phone Number: [phone]

## 2024-03-01 NOTE — PROGRESS NOTES
Patient called this morning concerned about his right hip MRI results and a procedure today with Dr. Vanegas. He is scheduled for a trigger finger procedure with Dr. Vanegas today, and is inquiring whether antibiotics are needed due to a finding on his MRI. Right hip MRI findings with concern for possible acetabulum osteomyelitis vs bone reaction/edema. Patient states that he discussed with Dr. Vanegas, and he advised antibiotics are not needed, but advised to also reach out to Dr. Colby.     MRI results and recent lab work from December including normal WBC and CRP reviewed per myself and Dr. LoPresti. Dr. LoPresti advises that antibiotics are not needed for trigger finger procedure with Dr. Vanegas after review of hip MRI and recent labs, but that ultimately that is up to Dr. Vanegas.     Discussed this with patient. Advised follow up with Dr. Colby. I have also ordered CBC w diff, CRP and sed rate. Patient denies fever/chills or recent illness.

## 2024-03-04 ENCOUNTER — OFFICE VISIT (OUTPATIENT)
Dept: DERMATOLOGY | Facility: CLINIC | Age: 59
End: 2024-03-04
Payer: COMMERCIAL

## 2024-03-04 DIAGNOSIS — L21.9 SEBORRHEIC DERMATITIS: ICD-10-CM

## 2024-03-04 DIAGNOSIS — L93.0 DISCOID LUPUS ERYTHEMATOSUS: ICD-10-CM

## 2024-03-04 PROCEDURE — 99213 OFFICE O/P EST LOW 20 MIN: CPT

## 2024-03-04 PROCEDURE — 1036F TOBACCO NON-USER: CPT

## 2024-03-04 PROCEDURE — 11900 INJECT SKIN LESIONS </W 7: CPT

## 2024-03-04 ASSESSMENT — PAIN SCALES - GENERAL: PAINLEVEL_OUTOF10: 0 - NO PAIN

## 2024-03-04 ASSESSMENT — DERMATOLOGY QUALITY OF LIFE (QOL) ASSESSMENT
RATE HOW EMOTIONALLY BOTHERED YOU ARE BY YOUR SKIN PROBLEM (FOR EXAMPLE, WORRY, EMBARRASSMENT, FRUSTRATION): 0 - NEVER BOTHERED
RATE HOW BOTHERED YOU ARE BY EFFECTS OF YOUR SKIN PROBLEMS ON YOUR ACTIVITIES (EG, GOING OUT, ACCOMPLISHING WHAT YOU WANT, WORK ACTIVITIES OR YOUR RELATIONSHIPS WITH OTHERS): 0 - NEVER BOTHERED
RATE HOW EMOTIONALLY BOTHERED YOU ARE BY YOUR SKIN PROBLEM (FOR EXAMPLE, WORRY, EMBARRASSMENT, FRUSTRATION): 0 - NEVER BOTHERED
RATE HOW BOTHERED YOU ARE BY SYMPTOMS OF YOUR SKIN PROBLEM (EG, ITCHING, STINGING BURNING, HURTING OR SKIN IRRITATION): 2
RATE HOW BOTHERED YOU ARE BY EFFECTS OF YOUR SKIN PROBLEMS ON YOUR ACTIVITIES (EG, GOING OUT, ACCOMPLISHING WHAT YOU WANT, WORK ACTIVITIES OR YOUR RELATIONSHIPS WITH OTHERS): 0 - NEVER BOTHERED
RATE HOW BOTHERED YOU ARE BY SYMPTOMS OF YOUR SKIN PROBLEM (EG, ITCHING, STINGING BURNING, HURTING OR SKIN IRRITATION): 2
WHAT SINGLE SKIN CONDITION LISTED BELOW IS THE PATIENT ANSWERING THE QUALITY-OF-LIFE ASSESSMENT QUESTIONS ABOUT: NONE OF THE ABOVE
WHAT SINGLE SKIN CONDITION LISTED BELOW IS THE PATIENT ANSWERING THE QUALITY-OF-LIFE ASSESSMENT QUESTIONS ABOUT: NONE OF THE ABOVE

## 2024-03-04 NOTE — PROGRESS NOTES
Subjective   HPI: Sharath Weiss is a 58 y.o. male is here for evaluation and treatment of discoid lupus of scalp and seborrheic dermatitis.  Patient offers no complaints today.  He has been taking the Plaquenil as prescribed and is not experiencing any side effects.  Patient does mention that he received a second opinion from [facility] dermatology regarding his discoid lupus of the scalp and they agree with our treatment regimen.    ROS: No other skin or systemic complaints other than what is documented elsewhere in the note.    ALLERGIES: Vancomycin    SOCIAL:  reports that he quit smoking about 6 years ago. His smoking use included cigarettes. He smoked an average of .25 packs per day. He has never been exposed to tobacco smoke. He has never used smokeless tobacco. He reports current alcohol use of about 10.0 standard drinks of alcohol per week. He reports that he does not use drugs.    Objective   Numerous patches of hair loss that appear less inflamed than last time. There appears to be some hair growth and previous injected areas.     Scalp  No acute skin findings today.        Assessment/Plan   1. Seborrheic dermatitis  Scalp    Patient is experience significant improvement of seborrheic dermatitis while using the ciclopirox shampoo and ketoconazole shampoo.  He is no longer experiencing scabs on his scalp.  I recommended continuing treatment.      Related Medications  ciclopirox 1 % shampoo  Wet hair and apply shampoo to scalp. Lather and leave on for 3 minutes. Rinse. Do this twice a week at night.    2. Discoid lupus erythematosus    I would like patient to continue taking the Plaquenil 200 mg tablet p.o. daily.  Discussed with patient that we can do a trial of going off of the medication this fall.  Discussed that I want him to stay on the medication over the summer as the son can cause more hair loss to patients with discoid lupus erythematosus.    Patient opts for more intralesional Kenalog  injections today as he thinks that is helping with the hair loss and hair regrowth.    Related Medications  hydroxychloroquine (PlaqueniL) 200 mg tablet  Take 1 tablet (200 mg) by mouth once daily.    triamcinolone acetonide (Kenalog) injection 10 mg           FOLLOW UP: 4 to 6 weeks for intralesional injections otherwise 6 months     The patient was encouraged to contact me with any further questions or concerns.  Hannah Holman PA-C  3/4/2024

## 2024-03-08 ENCOUNTER — OFFICE VISIT (OUTPATIENT)
Dept: ORTHOPEDIC SURGERY | Facility: CLINIC | Age: 59
End: 2024-03-08
Payer: COMMERCIAL

## 2024-03-08 DIAGNOSIS — M16.11 PRIMARY OSTEOARTHRITIS OF RIGHT HIP: Primary | ICD-10-CM

## 2024-03-08 LAB
1OH-MIDAZOLAM UR CFM-MCNC: <25 NG/ML
6MAM UR CFM-MCNC: <25 NG/ML
7AMINOCLONAZEPAM UR CFM-MCNC: <25 NG/ML
A-OH ALPRAZ UR CFM-MCNC: <25 NG/ML
ALPRAZ UR CFM-MCNC: <25 NG/ML
CHLORDIAZEP UR CFM-MCNC: <25 NG/ML
CLONAZEPAM UR CFM-MCNC: <25 NG/ML
CODEINE UR CFM-MCNC: <50 NG/ML
DIAZEPAM UR CFM-MCNC: <25 NG/ML
EDDP UR CFM-MCNC: <25 NG/ML
FENTANYL UR CFM-MCNC: <2.5 NG/ML
HYDROCODONE CTO UR CFM-MCNC: <25 NG/ML
HYDROMORPHONE UR CFM-MCNC: <25 NG/ML
LORAZEPAM UR CFM-MCNC: <25 NG/ML
METHADONE UR CFM-MCNC: <25 NG/ML
MIDAZOLAM UR CFM-MCNC: <25 NG/ML
MORPHINE UR CFM-MCNC: <50 NG/ML
NORDIAZEPAM UR CFM-MCNC: <25 NG/ML
NORFENTANYL UR CFM-MCNC: <2.5 NG/ML
NORHYDROCODONE UR CFM-MCNC: <25 NG/ML
NOROXYCODONE UR CFM-MCNC: <25 NG/ML
NORTRAMADOL UR-MCNC: <50 NG/ML
OXAZEPAM UR CFM-MCNC: <25 NG/ML
OXYCODONE UR CFM-MCNC: <25 NG/ML
OXYMORPHONE UR CFM-MCNC: <25 NG/ML
TEMAZEPAM UR CFM-MCNC: <25 NG/ML
TRAMADOL UR CFM-MCNC: <50 NG/ML
ZOLPIDEM UR CFM-MCNC: 958 NG/ML
ZOLPIDEM UR-MCNC: <25 NG/ML

## 2024-03-08 PROCEDURE — 99213 OFFICE O/P EST LOW 20 MIN: CPT | Performed by: EMERGENCY MEDICINE

## 2024-03-08 PROCEDURE — 1036F TOBACCO NON-USER: CPT | Performed by: EMERGENCY MEDICINE

## 2024-03-08 RX ORDER — MELOXICAM 15 MG/1
15 TABLET ORAL DAILY
Qty: 30 TABLET | Refills: 1 | Status: SHIPPED | OUTPATIENT
Start: 2024-03-08 | End: 2024-05-07

## 2024-03-08 NOTE — PROGRESS NOTES
Subjective   Sharath Weiss is a 58 y.o. male who presents for No chief complaint on file.    HPI    3/8/24: Patient returns today for right hip pain.  We did perform a right hip intra-articular injection for him on 8/25/2023.  Unfortunately, he did not have significant relief from this injection.  Since that time, he had an MRI performed network radiology showed signs concerning for possible underlying synovitis infection.  He then followed up and had lab work including CBC, CMP, ESR, and CRP.  Labs were quite reassuring.  However, he continues to have anterior hip and groin pain.  He would like to discuss further treatment options today.    8/25/23: This is a 57 yo M referred by Dr. Vanegas w/ R hip pain. He had ultrasound of R hip on 6/23/23 at [facility] which showed mild teninosis of the gluteal tendons without tear. First had pain about 3 months ago but was able to rehab it but had to take a break due to shoulder surgery. Over the past 10 days, it has progressed. He has pain starting on his R hip and radiates down his medial thigh. No falls, just gradually developed pain. No back pain or sciatic pain.       ROS: All pertinent positive symptoms are included in the history of present illness.    All other systems have been reviewed and are negative and noncontributory to this patient's current ailments.    Objective     There were no vitals filed for this visit.    Physical Exam  General/Constitutional: No apparent distress. Well-nourished and well developed.  Head: Normocephalic, Atraumatic.   Eyes: EOMI.  Vascular: No edema, swelling or tenderness, except as noted in detailed exam.  Respiratory: Non-labored breathing.  Integumentary: No impressive skin lesions present, except as noted in detailed exam.  Neurological: Alert and oriented.  Psychological: Normal mood and affect.  Musculoskeletal: Normal, except as noted in detailed exam.     Right Hip: Appearance: Normal. Tenderness: None. ROM: Full. Motor: Normal.  Special Tests: positive impingement test, positive Rosemary's test and positive Michael' test, but negative JIA test, no joint laxity, negative Sacroiliac Compression test and negative Straight Leg Raise.         Assessment/Plan   Problem List Items Addressed This Visit       DJD (degenerative joint disease) - Primary    Relevant Medications    meloxicam (Mobic) 15 mg tablet     I discussed with patient that I do feel that his pain is likely originating from the hip joint itself.  I do feel this is likely due to underlying DJD.  I discussed with patient I am more than happy to perform a repeat intra-articular injection, however I have concern considering that he did not have significant relief from the previous injection.  Additionally, I discussed with patient that he cannot have a total hip replacement or any surgeries within 3 months of the injection.  After this discussion, he would like to hold off considering that he has a surgical consultation next week.  I am more than happy to continue injections for him as long as he would like.    Ishan Pantoja, DO  Sports Medicine  [facility], [facility] [place]    ** Please excuse any errors in grammar or translation related to this dictation. Voice recognition software was utilized to prepare this document. **

## 2024-03-12 ENCOUNTER — OFFICE VISIT (OUTPATIENT)
Dept: ORTHOPEDIC SURGERY | Facility: CLINIC | Age: 59
End: 2024-03-12
Payer: COMMERCIAL

## 2024-03-12 ENCOUNTER — OFFICE VISIT (OUTPATIENT)
Dept: PRIMARY CARE | Facility: CLINIC | Age: 59
End: 2024-03-12
Payer: COMMERCIAL

## 2024-03-12 VITALS
WEIGHT: 194 LBS | HEART RATE: 78 BPM | BODY MASS INDEX: 27.16 KG/M2 | DIASTOLIC BLOOD PRESSURE: 86 MMHG | SYSTOLIC BLOOD PRESSURE: 156 MMHG | HEIGHT: 71 IN | OXYGEN SATURATION: 95 %

## 2024-03-12 VITALS — BODY MASS INDEX: 26.6 KG/M2 | WEIGHT: 190 LBS | HEIGHT: 71 IN

## 2024-03-12 DIAGNOSIS — Z23 NEED FOR TDAP VACCINATION: ICD-10-CM

## 2024-03-12 DIAGNOSIS — M76.891 HIP FLEXOR TENDINITIS, RIGHT: ICD-10-CM

## 2024-03-12 DIAGNOSIS — E78.5 HYPERLIPIDEMIA, UNSPECIFIED HYPERLIPIDEMIA TYPE: ICD-10-CM

## 2024-03-12 DIAGNOSIS — K29.50 CHRONIC GASTRITIS WITHOUT BLEEDING, UNSPECIFIED GASTRITIS TYPE: ICD-10-CM

## 2024-03-12 DIAGNOSIS — F51.01 PRIMARY INSOMNIA: Primary | ICD-10-CM

## 2024-03-12 DIAGNOSIS — M16.11 ARTHRITIS OF RIGHT HIP: ICD-10-CM

## 2024-03-12 DIAGNOSIS — Z51.81 ENCOUNTER FOR THERAPEUTIC DRUG LEVEL MONITORING: ICD-10-CM

## 2024-03-12 DIAGNOSIS — I10 HYPERTENSION, ESSENTIAL: ICD-10-CM

## 2024-03-12 DIAGNOSIS — Z02.83 ENCOUNTER FOR DRUG SCREENING: ICD-10-CM

## 2024-03-12 PROCEDURE — 99214 OFFICE O/P EST MOD 30 MIN: CPT | Performed by: FAMILY MEDICINE

## 2024-03-12 PROCEDURE — 90471 IMMUNIZATION ADMIN: CPT | Performed by: FAMILY MEDICINE

## 2024-03-12 PROCEDURE — 99213 OFFICE O/P EST LOW 20 MIN: CPT | Performed by: ORTHOPAEDIC SURGERY

## 2024-03-12 PROCEDURE — 1036F TOBACCO NON-USER: CPT | Performed by: ORTHOPAEDIC SURGERY

## 2024-03-12 PROCEDURE — 3079F DIAST BP 80-89 MM HG: CPT | Performed by: FAMILY MEDICINE

## 2024-03-12 PROCEDURE — 1036F TOBACCO NON-USER: CPT | Performed by: FAMILY MEDICINE

## 2024-03-12 PROCEDURE — 90715 TDAP VACCINE 7 YRS/> IM: CPT | Performed by: FAMILY MEDICINE

## 2024-03-12 PROCEDURE — 3077F SYST BP >= 140 MM HG: CPT | Performed by: FAMILY MEDICINE

## 2024-03-12 RX ORDER — ROSUVASTATIN CALCIUM 10 MG/1
10 TABLET, COATED ORAL DAILY
Qty: 90 TABLET | Refills: 2 | Status: SHIPPED | OUTPATIENT
Start: 2024-03-12

## 2024-03-12 RX ORDER — ZOLPIDEM TARTRATE 10 MG/1
10 TABLET ORAL NIGHTLY PRN
Qty: 90 TABLET | Refills: 1 | Status: SHIPPED | OUTPATIENT
Start: 2024-03-12 | End: 2024-09-08

## 2024-03-12 RX ORDER — LISINOPRIL 10 MG/1
10 TABLET ORAL DAILY
Qty: 90 TABLET | Refills: 3 | Status: SHIPPED | OUTPATIENT
Start: 2024-03-12 | End: 2025-03-07

## 2024-03-12 NOTE — PROGRESS NOTES
General Medical Management Note    58 y.o. male presents for Medical Management  HPI    Lumbar laminectomy by Dr Quiros in December 2023.  Lower back has felt better however right hip is now severely painful.  He is walking with a limp.  He has difficulty sleeping.  Seen earlier today by orthopedic surgeon, Dr. Gale who diagnosed hip flexor tendinitis and arthritis of the right hip.  Started on Mobic.  Planning intra-articular corticosteroid injection in the near future.    Trigger thumb of right hand was repaired on March 1, 2024 by Dr. Vanegas.  Patient has recovered well.    I manage 3 of his medications: Magic mouthwash (intermittent esophagitis), hypertension, hyperlipidemia and insomnia    Patient also sees gastroenterology and dermatology.    Mild anemia: Patient also requested a review of recent labs that were done by Dr. Jimenez to evaluate progressive decline in hemoglobin and RBCs.  Dr. Ochoa's message to the patient was that the lab tests did not indicate a particular problem and that she would like to repeat a CBC in 6 months.      Past Medical History:   Diagnosis Date    BPH (benign prostatic hyperplasia)     Cervical disc disease     Discoid lupus erythematosus     Diverticulitis     s/p bowel resection 2021    GERD (gastroesophageal reflux disease)     History of sepsis     2020 - left knee    Hyperlipidemia     Hypertension     Insomnia     Irritable bowel syndrome     Lichen planus     Lumbar stenosis     Osteoarthritis     Spinal stenosis     Superficial phlebitis of arm     Tinnitus     Trigger finger of right thumb     Trigger finger of thumb     Urachal cyst     Vitamin D deficiency       Past Surgical History:   Procedure Laterality Date    ABSCESS DRAINAGE  2022    abdominal    ANTERIOR CRUCIATE LIGAMENT REPAIR  04/24/2015    Primary Repair Of Knee Ligament Cruciate Anterior    BOWEL RESECTION      COLONOSCOPY  11/02/2022    Complete Colonoscopy    ELBOW FRACTURE SURGERY Left     KNEE  ARTHROSCOPY W/ DEBRIDEMENT  2015    Knee Arthroscopy (Therapeutic)    KNEE ARTHROSCOPY W/ HARDWARE REMOVAL Left     2015    LUMBAR FUSION  2015    Lumbar Vertebral Fusion    LUMBAR LAMINECTOMY  2015    Laminectomy Lumbar    OTHER SURGICAL HISTORY  2020    Epidural steroid injection    OTHER SURGICAL HISTORY  2018    Arthroscopy Shoulder Right x 2    SHOULDER ARTHROSCOPY Left     x 3    SPINAL FUSION  2023    UPPER GASTROINTESTINAL ENDOSCOPY       Family History   Problem Relation Name Age of Onset    Breast cancer Mother      Hypertension Father      Transient ischemic attack Father      Irritable bowel syndrome Sister        Social History     Socioeconomic History    Marital status:      Spouse name: Not on file    Number of children: Not on file    Years of education: Not on file    Highest education level: Not on file   Occupational History    Not on file   Tobacco Use    Smoking status: Former     Packs/day: .25     Types: Cigarettes     Quit date:      Years since quittin.1     Passive exposure: Never    Smokeless tobacco: Never    Tobacco comments:     Pt denies any illness in past 30 days.      Denies SOB with stairs or activity.     Denies need for assist devices for ambulation.     Denies any personal or family reactions to anesthesia.   Vaping Use    Vaping Use: Every day    Substances: Nicotine   Substance and Sexual Activity    Alcohol use: Yes     Alcohol/week: 10.0 standard drinks of alcohol     Types: 10 Cans of beer per week     Comment: MODERATELY    Drug use: Never    Sexual activity: Defer   Other Topics Concern    Not on file   Social History Narrative    Not on file     Social Determinants of Health     Financial Resource Strain: Low Risk  (2023)    Overall Financial Resource Strain (CARDIA)     Difficulty of Paying Living Expenses: Not hard at all   Food Insecurity: Not on file   Transportation Needs: No Transportation Needs (2023)     PRAPARE - Transportation     Lack of Transportation (Medical): No     Lack of Transportation (Non-Medical): No   Physical Activity: Not on file   Stress: Not on file   Social Connections: Not on file   Intimate Partner Violence: Not on file   Housing Stability: Low Risk  (12/29/2023)    Housing Stability Vital Sign     Unable to Pay for Housing in the Last Year: No     Number of Places Lived in the Last Year: 1     Unstable Housing in the Last Year: No       Current Outpatient Medications on File Prior to Visit   Medication Sig Dispense Refill    ciclopirox 1 % shampoo Wet hair and apply shampoo to scalp. Lather and leave on for 3 minutes. Rinse. Do this twice a week at night. 120 mL 11    fluticasone (Flonase Sensimist) 27.5 mcg/actuation nasal spray Administer 1 spray into each nostril once daily as needed for rhinitis.      HYDROcodone-acetaminophen (Norco) 5-325 mg tablet Take 1 tablet by mouth every 6 hours if needed for severe pain (7 - 10). 12 tablet 0    hydroxychloroquine (PlaqueniL) 200 mg tablet Take 1 tablet (200 mg) by mouth once daily. 30 tablet 2    ketoconazole (NIZOral) 2 % shampoo Apply 1 Application topically 2 times a week.      lido-diphen-Maalox 1:1:1 Magic Mouthwash Swish and swallow 30 mL 3 times a day as needed (severe bdominal pain). 300 mL 2    lisinopril 10 mg tablet Take 1 tablet (10 mg) by mouth once daily. 90 tablet 3    meloxicam (Mobic) 15 mg tablet Take 1 tablet (15 mg) by mouth once daily. 30 tablet 1    pantoprazole (ProtoNix) 40 mg EC tablet       rosuvastatin (Crestor) 10 mg tablet Take 1 tablet (10 mg) by mouth once daily. 90 tablet 2    zolpidem (Ambien) 10 mg tablet Take 1 tablet (10 mg) by mouth as needed at bedtime for sleep. (Patient taking differently: Take 1 tablet (10 mg) by mouth once daily at bedtime.) 90 tablet 1    azelastine (Astelin) 137 mcg (0.1 %) nasal spray Administer 1 spray into each nostril in the morning and 1 spray before bedtime. Use in each nostril as  "directed. 30 mL 2    methocarbamol (Robaxin) 500 mg tablet Take 1-2 tabs every 8 hours as needed for spasms (Patient not taking: Reported on 3/12/2024) 60 tablet 2    ondansetron ODT (Zofran-ODT) 4 mg disintegrating tablet Take 1 tablet (4 mg) by mouth every 8 hours if needed.      tacrolimus (Protopic) 0.1 % ointment in the morning and at bedtime. APPLY AND GENTLY MASSAGE INTO AFFECTED AREA(S)       No current facility-administered medications on file prior to visit.       Allergies   Allergen Reactions    Vancomycin Nausea/vomiting, Anxiety and Tinnitus     Other reaction(s): Intolerance   Tinnitus and shaking         ROS: Denies chest pain, SOB, Headache, GI problems     Visit Vitals  /86   Pulse 78   Ht 1.803 m (5' 11\")   Wt 88 kg (194 lb)   SpO2 95%   BMI 27.06 kg/m²   Smoking Status Former   BSA 2.1 m²        PHYSICAL EXAM:  Alert and oriented x3.  Eyes: EOM grossly intact  Neck supple without lymph adenopathy or carotid bruit.  No masses or thyromegaly  Heart regular rate and rhythm without murmur.  Lungs clear to auscultation.  Legs without edema.  Gait is non-antalgic  Speech clear.  Hearing adequate.          DIAGNOSIS/PLAN:  1. Primary insomnia  - zolpidem (Ambien) 10 mg tablet; Take 1 tablet (10 mg) by mouth as needed at bedtime for sleep.  Dispense: 90 tablet; Refill: 1    2. Hypertension, essential  - Comprehensive Metabolic Panel; Future  - lisinopril 10 mg tablet; Take 1 tablet (10 mg) by mouth once daily.  Dispense: 90 tablet; Refill: 3    3. Hyperlipidemia, unspecified hyperlipidemia typ  - rosuvastatin (Crestor) 10 mg tablet; Take 1 tablet (10 mg) by mouth once daily.  Dispense: 90 tablet; Refill: 2    4. Encounter for therapeutic drug level monitoring  - Opiate/Opioid/Benzo Prescription Compliance; Future    5. Encounter for drug screening  - Opiate/Opioid/Benzo Prescription Compliance; Future    6. Need for Tdap vaccination  - Tdap vaccine, age 7 years and older    7. Chronic gastritis " without bleeding, unspecified gastritis type  - lidocaine-diphenhydrAMINE-Maalox 1:1:1 Magic Mouthwash; Swish and swallow 30 mL 3 times a day as needed (severe bdominal pain).  Dispense: 300 mL; Refill: 2        Return to office in 6 months for comprehensive medical evaluation, long-term medication use monitoring, and preventative services screening    We will continue to monitor, evaluate, assess and treat all problems/diagnoses as appropriate and continue to collaborate with specialists.    Encouraged to sign up with [facility]    Contact office or send a  [employer] message with any questions or concerns    Patient will only be notified of labs that require medical intervention.    Prescriptions will not be filled unless you are compliant with your follow up appointments or have a follow up appointment scheduled as per instruction of your physician. Refills should be requested at the time of your visit.    **Charting was completed using voice recognition technology and may include unintended errors**    Buster Barney DO, RITA  [address] [id number]  [place] [id number] [phone]  Buster Barney DO, RITA

## 2024-03-13 NOTE — PROGRESS NOTES
58-year-old is seen with right hip pain.  He has been having a persistent moderate throbbing pain along the anterior aspect of the right hip.  Pain is worse with standing and walking and increased activities.  He has had a lumbar spine fusion December 2023 and has been advised by Dr. Quiros the fusion is well-healed.  He had a intra-articular hip injection under ultrasound August 2023 without significant relief.  He has taken ibuprofen and Tylenol.    Pleasant in no acute distress.  Walks with a mildly antalgic gait.  There is tenderness on the anterior aspect of the hip.  Right hip flexes 90 degrees with slight decrease internal/external rotation compared to the left hip.    MRI of the right hip is personally reviewed and there is tearing involving the labrum.  There is mild to moderate chondral change.  There is marrow edema in the area of the rectus femoris origin.  Reactive intramuscular fluid involving the rectus femoris, tensor fascia esteban, sartorius and iliopsoas.    A discussion about his hip was done.  He does have early degenerative changes in the hip but based on plain x-ray and MRI does not appear to be the point to which hip replacement would be necessary at this point.  Also his lack of improvement with the intra-articular injection suggest that pain is more from an extra-articular location.  The MRI has signal change along the anterior aspect of the hip particular along the rectus insertion.  He did not benefit significantly from intra-articular injection but injection in the area of the rectus insertion or adjacent areas with reactive intramuscular fluid may be beneficial.  He can use Tylenol or a NSAID.

## 2024-03-19 ENCOUNTER — APPOINTMENT (OUTPATIENT)
Dept: ORTHOPEDIC SURGERY | Facility: CLINIC | Age: 59
End: 2024-03-19
Payer: COMMERCIAL

## 2024-03-21 ENCOUNTER — OFFICE VISIT (OUTPATIENT)
Dept: ORTHOPEDIC SURGERY | Facility: HOSPITAL | Age: 59
End: 2024-03-21
Payer: COMMERCIAL

## 2024-03-21 DIAGNOSIS — M25.551 RIGHT HIP PAIN: ICD-10-CM

## 2024-03-21 PROCEDURE — 1036F TOBACCO NON-USER: CPT | Performed by: EMERGENCY MEDICINE

## 2024-03-21 PROCEDURE — 99214 OFFICE O/P EST MOD 30 MIN: CPT | Performed by: EMERGENCY MEDICINE

## 2024-03-21 PROCEDURE — 76942 ECHO GUIDE FOR BIOPSY: CPT | Performed by: EMERGENCY MEDICINE

## 2024-03-21 PROCEDURE — 64425 NJX AA&/STRD II IH NERVES: CPT | Performed by: EMERGENCY MEDICINE

## 2024-03-21 NOTE — PROGRESS NOTES
Subjective   Sharath Weiss is a 58 y.o. male who presents for Follow-up of the Right Hip    HPI    3/21/24: Patient returns today for reevaluation for right hip pain.  During his last visit, we discussed the option of a repeat intra-articular injection versus surgical consultation.  He has since followed up with Dr. Colby and there is uncertainty of the origin of his pain.  Therefore, he presents today for further evaluation and consideration for therapeutic and diagnostic injection therapies.  His primary complaint is radiation of pain along the medial groin and down the medial aspect of his thigh.    3/8/24: Patient returns today for right hip pain.  We did perform a right hip intra-articular injection for him on 8/25/2023.  Unfortunately, he did not have significant relief from this injection.  Since that time, he had an MRI performed network radiology showed signs concerning for possible underlying synovitis infection.  He then followed up and had lab work including CBC, CMP, ESR, and CRP.  Labs were quite reassuring.  However, he continues to have anterior hip and groin pain.  He would like to discuss further treatment options today.    8/25/23: This is a 59 yo M referred by Dr. Vanegas w/ R hip pain. He had ultrasound of R hip on 6/23/23 at [facility] which showed mild teninosis of the gluteal tendons without tear. First had pain about 3 months ago but was able to rehab it but had to take a break due to shoulder surgery. Over the past 10 days, it has progressed. He has pain starting on his R hip and radiates down his medial thigh. No falls, just gradually developed pain. No back pain or sciatic pain.       ROS: All pertinent positive symptoms are included in the history of present illness.    All other systems have been reviewed and are negative and noncontributory to this patient's current ailments.    Objective     There were no vitals filed for this visit.    Physical Exam  General/Constitutional: No apparent  distress. Well-nourished and well developed.  Head: Normocephalic, Atraumatic.   Eyes: EOMI.  Vascular: No edema, swelling or tenderness, except as noted in detailed exam.  Respiratory: Non-labored breathing.  Integumentary: No impressive skin lesions present, except as noted in detailed exam.  Neurological: Alert and oriented.  Psychological: Normal mood and affect.  Musculoskeletal: Normal, except as noted in detailed exam.     Right Hip: Appearance: Normal. Tenderness: None. ROM: Full. Motor: Normal. Special Tests: positive impingement test, positive Rosemary's test and positive Michael' test, but negative JIA test, no joint laxity, negative Sacroiliac Compression test and negative Straight Leg Raise.     Patient ID: Sharath Weiss is a 58 y.o. male.    Right ilioinguinal nerve Hydrodissection  Consent  After discussing the various treatment options for the condition,  It was agreed that a hydrodissection procedure would be the next step in treatment.  The nature of and the indications for a corticosteroid and / or local anaesthetic injection were reviewed in detail with the patient today.  The inherent risks of injection including infection, allergic reaction, increased pain, incomplete relief or temporary relief of symptoms, alterations of blood glucose levels requiring careful monitoring and treatment as indicated, tendon, ligament or articular cartilage rupture or degeneration, nerve injury, skin depigmentation, and/or fatty atrophy were discussed.      Procedure  After the risks and benefits of the procedure were explained, consent was given, and time-out was performed.  The nerve and surrounding structures were visualized with ultrasound.   The site for the injection was properly marked and prepped with Chlorhexadine solution.       The injection site was anesthetized with ethyl chloride and 3cc of 1% Lidocaine with a 25 gauge 1.5 inch needle. Using ultrasound guidance, the ilioinguinal nerve was visualized  and the scar tissue surrounding the ilioinguinal nerve was hydrodissected with 4cc of 1% lidocaine and 40 mg Kenalog using a 25-gauge 2 inch needle. During injection, there was unrestricted flow and care was taken not to inject corticosteroid into the skin or subcutaneous tissues.     A sterile band-aide was applied.  Post-injection instructions were given regarding post-procedure care, when to follow up in clinic and what to expect from the procedure.  The patient tolerated the injection well and was discharged without complication.         Assessment/Plan   Problem List Items Addressed This Visit       Right hip pain    Relevant Orders    Point of Care Ultrasound (Completed)     Considering source of pain and lack of improvement with the previous intra-articular injection, we did discuss the option of a ilioinguinal nerve injection considering his regions of pain.  He is in agreement would like to proceed.  Discussed risks versus benefits of having injection performed. Patient has elected to proceed with procedure. Please refer to procedure note above. Discussed with patient to avoid water submersion over the next 2 days. Discussed with patient to call me immediately if they develop worsening pain, rash, erythema, or fevers. Patient should follow-up as needed pain persist or worsens.      Ishan Pantoja, DO  Sports Medicine  [facility], [facility] [place]    ** Please excuse any errors in grammar or translation related to this dictation. Voice recognition software was utilized to prepare this document. **

## 2024-03-22 ENCOUNTER — HOSPITAL ENCOUNTER (OUTPATIENT)
Dept: RADIOLOGY | Facility: EXTERNAL LOCATION | Age: 59
Discharge: HOME | End: 2024-03-22

## 2024-03-25 ENCOUNTER — OFFICE VISIT (OUTPATIENT)
Dept: ORTHOPEDIC SURGERY | Facility: CLINIC | Age: 59
End: 2024-03-25
Payer: COMMERCIAL

## 2024-03-25 VITALS — HEIGHT: 71 IN | BODY MASS INDEX: 27.16 KG/M2 | WEIGHT: 194 LBS

## 2024-03-25 DIAGNOSIS — M65.311 TRIGGER THUMB OF RIGHT HAND: Primary | ICD-10-CM

## 2024-03-25 PROCEDURE — 1036F TOBACCO NON-USER: CPT | Performed by: ORTHOPAEDIC SURGERY

## 2024-03-25 PROCEDURE — 99024 POSTOP FOLLOW-UP VISIT: CPT | Performed by: ORTHOPAEDIC SURGERY

## 2024-03-25 ASSESSMENT — PAIN DESCRIPTION - DESCRIPTORS: DESCRIPTORS: SORE

## 2024-03-25 ASSESSMENT — PAIN - FUNCTIONAL ASSESSMENT: PAIN_FUNCTIONAL_ASSESSMENT: 0-10

## 2024-03-25 ASSESSMENT — PAIN SCALES - GENERAL: PAINLEVEL_OUTOF10: 3

## 2024-03-25 NOTE — PROGRESS NOTES
Date of surgery: 03/01/24         Surgery(s) performed: Right thumb trigger release       Patient presents today for his first post-operative visit, performed on 03/25/24.   He had to cancel his initially scheduled post operative visit because of a work conflict and now is presenting for his first follow up. He has a little bit of tenderness at the incision site, but other than that he's doing well.       Exam findings: Examination reveals that his incision is well healed. He does have some mild scar thickening. He has a  little bit of tenderness to palpation but no triggering. Normal sensation.    Impression: Right thumb trigger finger     Plan: He can progressively return to activities as tolerated. I have encouraged scar massage and desensitization techniques and he may follow up with me as needed for any further issues or concerns.         Yovanny Vanegas MD          [facility]     Department of Orthopaedic Surgery     Chief of Hand and Upper Extremity Surgery     [facility]     Scribe Attestation  By signing my name below, IRosalind Scribe   attest that this documentation has been prepared under the direction and in the presence of Yovanny Vanegas MD.

## 2024-03-25 NOTE — INTERVAL H&P NOTE
H&P reviewed. The patient was examined and there are no changes to the H&P.   Monitor Summary: Sinus Bradycardia  .18/.12/.59/.54

## 2024-04-02 ENCOUNTER — APPOINTMENT (OUTPATIENT)
Dept: ORTHOPEDIC SURGERY | Facility: CLINIC | Age: 59
End: 2024-04-02
Payer: COMMERCIAL

## 2024-04-05 ENCOUNTER — OFFICE VISIT (OUTPATIENT)
Dept: OPHTHALMOLOGY | Facility: CLINIC | Age: 59
End: 2024-04-05
Payer: COMMERCIAL

## 2024-04-05 DIAGNOSIS — I80.8 PHLEBITIS OF SUPERFICIAL VEIN OF UPPER EXTREMITY: ICD-10-CM

## 2024-04-05 DIAGNOSIS — Z01.00 EXAMINATION OF EYES AND VISION: Primary | ICD-10-CM

## 2024-04-05 DIAGNOSIS — H04.123 DRY EYE SYNDROME OF BOTH EYES: ICD-10-CM

## 2024-04-05 DIAGNOSIS — I80.8 SUPERFICIAL PHLEBITIS OF ARM: ICD-10-CM

## 2024-04-05 DIAGNOSIS — Z79.64: ICD-10-CM

## 2024-04-05 PROBLEM — L93.2 CUTANEOUS LUPUS ERYTHEMATOSUS: Status: ACTIVE | Noted: 2024-04-05

## 2024-04-05 PROBLEM — H04.129 DRY EYE SYNDROME: Status: ACTIVE | Noted: 2024-04-05

## 2024-04-05 PROCEDURE — 92134 CPTRZ OPH DX IMG PST SGM RTA: CPT | Performed by: OPHTHALMOLOGY

## 2024-04-05 PROCEDURE — 99204 OFFICE O/P NEW MOD 45 MIN: CPT | Performed by: OPHTHALMOLOGY

## 2024-04-05 ASSESSMENT — CONF VISUAL FIELD
OD_SUPERIOR_NASAL_RESTRICTION: 0
OD_INFERIOR_TEMPORAL_RESTRICTION: 0
OS_INFERIOR_TEMPORAL_RESTRICTION: 0
OS_SUPERIOR_NASAL_RESTRICTION: 0
OD_INFERIOR_NASAL_RESTRICTION: 0
OD_SUPERIOR_TEMPORAL_RESTRICTION: 0
OS_INFERIOR_NASAL_RESTRICTION: 0
OS_SUPERIOR_TEMPORAL_RESTRICTION: 0

## 2024-04-05 ASSESSMENT — VISUAL ACUITY
OS_CC: 20/20
CORRECTION_TYPE: GLASSES
OD_CC: 20/20
METHOD: SNELLEN - LINEAR
OD_CC+: -2
OD_CC: 20/20

## 2024-04-05 ASSESSMENT — ENCOUNTER SYMPTOMS
CARDIOVASCULAR NEGATIVE: 0
ENDOCRINE NEGATIVE: 0
HEMATOLOGIC/LYMPHATIC NEGATIVE: 0
GASTROINTESTINAL NEGATIVE: 0
RESPIRATORY NEGATIVE: 0
NEUROLOGICAL NEGATIVE: 0
MUSCULOSKELETAL NEGATIVE: 0
CONSTITUTIONAL NEGATIVE: 0
ALLERGIC/IMMUNOLOGIC NEGATIVE: 0
PSYCHIATRIC NEGATIVE: 0
EYES NEGATIVE: 1

## 2024-04-05 ASSESSMENT — TONOMETRY
IOP_METHOD: GOLDMANN APPLANATION
OS_IOP_MMHG: 18
OD_IOP_MMHG: 18

## 2024-04-05 ASSESSMENT — SLIT LAMP EXAM - LIDS
COMMENTS: NORMAL
COMMENTS: NORMAL

## 2024-04-05 ASSESSMENT — EXTERNAL EXAM - LEFT EYE: OS_EXAM: NORMAL

## 2024-04-05 ASSESSMENT — EXTERNAL EXAM - RIGHT EYE: OD_EXAM: NORMAL

## 2024-04-05 NOTE — PROGRESS NOTES
Assessment/Plan   Diagnoses and all orders for this visit:  Examination of eyes and vision  -     OCT, Retina - OU - Both Eyes  Phlebitis of superficial vein of upper extremity  Superficial phlebitis of arm  Dry eye syndrome of both eyes  Long-term current use of hydroxycarbamide      DIAGNOSTIC PROCEDURE DONE    OCT DONE OD/OS            REASON FOR TEST: will help address and tailor  therapy by detecting subclinical  HCQ toxicity    Hi quality OCT  scans obtained  signal good    OCT OD - Normal Foveal Contour, No Edema, IS/OS Junction Normal  OCT OS - Normal Foveal Contour, No Edema, IS/OS Junction Normal    additional commnents:       There is evidence of HCQ use but no toxicity seen imaging or clincilly  Sees 202 0 at near right    He does have dry eyes   He does not need immunosuppressive therapy      Get refraction    Fu 1yr

## 2024-04-10 NOTE — PROGRESS NOTES
Subjective   HPI: Sharath Weiss is a 58 y.o. male who presents in office for follow up evaluation and treatment of discoid lupus.  Patient has concerns of blurred vision over the last 4 weeks.  He was told to discontinue Plaquenil at the beginning of April until his vision situation Figured out.  Patient has not been taking the Plaquenil since being told to discontinue.  He has seen 2 different eye doctors who have told him that there is no evidence of Plaquenil eye toxicity however he continues to have blurred vision.    ROS: No other skin or systemic complaints other than what is documented elsewhere in the note.    ALLERGIES: Vancomycin    SOCIAL:  reports that he quit smoking about 6 years ago. His smoking use included cigarettes. He has never been exposed to tobacco smoke. He has never used smokeless tobacco. He reports current alcohol use of about 10.0 standard drinks of alcohol per week. He reports that he does not use drugs.    Objective   Numerous patches of hair loss that appear less inflamed than last time. There appears to be some hair growth and previous injected areas     Scalp  No acute skin findings today        Assessment/Plan   1. Discoid lupus erythematosus    Discussed with patient alternative treatment options.  Discoid lupus erythematosus intralesional Kenalog injection as well as topical steroids such as fluocinonide solution.  Patient would like to continue on the intralesional Kenalog injections and wait on restarting the Plaquenil until he has his follow-up appointment with his eye doctor who does that antimalarial eye test.    Dr. Denney saw the patient on 4/5/2024 and did not find any evidence of Plaquenil toxicity.    Related Medications  hydroxychloroquine (PlaqueniL) 200 mg tablet  Take 1 tablet (200 mg) by mouth once daily.    triamcinolone acetonide (Kenalog) injection 10 mg      2. Seborrheic dermatitis  Scalp    Continue ciclopirox shampoo.    Related Medications  ciclopirox 1 %  shampoo  Wet hair and apply shampoo to scalp. Lather and leave on for 3 minutes. Rinse. Do this twice a week at night.         FOLLOW UP: 6 weeks-Ilk    The patient was encouraged to contact me with any further questions or concerns.  Hannah Holman PA-C  4/15/2024

## 2024-04-14 ASSESSMENT — DERMATOLOGY QUALITY OF LIFE (QOL) ASSESSMENT
RATE HOW BOTHERED YOU ARE BY EFFECTS OF YOUR SKIN PROBLEMS ON YOUR ACTIVITIES (EG, GOING OUT, ACCOMPLISHING WHAT YOU WANT, WORK ACTIVITIES OR YOUR RELATIONSHIPS WITH OTHERS): 2
RATE HOW BOTHERED YOU ARE BY EFFECTS OF YOUR SKIN PROBLEMS ON YOUR ACTIVITIES (EG, GOING OUT, ACCOMPLISHING WHAT YOU WANT, WORK ACTIVITIES OR YOUR RELATIONSHIPS WITH OTHERS): 2
RATE HOW EMOTIONALLY BOTHERED YOU ARE BY YOUR SKIN PROBLEM (FOR EXAMPLE, WORRY, EMBARRASSMENT, FRUSTRATION): 2
RATE HOW BOTHERED YOU ARE BY SYMPTOMS OF YOUR SKIN PROBLEM (EG, ITCHING, STINGING BURNING, HURTING OR SKIN IRRITATION): 0 - NEVER BOTHERED
RATE HOW BOTHERED YOU ARE BY SYMPTOMS OF YOUR SKIN PROBLEM (EG, ITCHING, STINGING BURNING, HURTING OR SKIN IRRITATION): 0 - NEVER BOTHERED
RATE HOW EMOTIONALLY BOTHERED YOU ARE BY YOUR SKIN PROBLEM (FOR EXAMPLE, WORRY, EMBARRASSMENT, FRUSTRATION): 2

## 2024-04-15 ENCOUNTER — OFFICE VISIT (OUTPATIENT)
Dept: DERMATOLOGY | Facility: CLINIC | Age: 59
End: 2024-04-15
Payer: COMMERCIAL

## 2024-04-15 DIAGNOSIS — L21.9 SEBORRHEIC DERMATITIS: ICD-10-CM

## 2024-04-15 DIAGNOSIS — L93.0 DISCOID LUPUS ERYTHEMATOSUS: Primary | ICD-10-CM

## 2024-04-15 PROCEDURE — 99213 OFFICE O/P EST LOW 20 MIN: CPT

## 2024-04-15 PROCEDURE — 11900 INJECT SKIN LESIONS </W 7: CPT

## 2024-04-24 ENCOUNTER — OFFICE VISIT (OUTPATIENT)
Dept: ORTHOPEDIC SURGERY | Facility: CLINIC | Age: 59
End: 2024-04-24
Payer: COMMERCIAL

## 2024-04-24 VITALS — BODY MASS INDEX: 27.16 KG/M2 | HEIGHT: 71 IN | WEIGHT: 194 LBS

## 2024-04-24 DIAGNOSIS — M54.50 CHRONIC LOW BACK PAIN WITHOUT SCIATICA, UNSPECIFIED BACK PAIN LATERALITY: Primary | ICD-10-CM

## 2024-04-24 DIAGNOSIS — G89.29 CHRONIC LOW BACK PAIN WITHOUT SCIATICA, UNSPECIFIED BACK PAIN LATERALITY: Primary | ICD-10-CM

## 2024-04-24 PROCEDURE — 99213 OFFICE O/P EST LOW 20 MIN: CPT | Performed by: ORTHOPAEDIC SURGERY

## 2024-04-24 ASSESSMENT — PAIN - FUNCTIONAL ASSESSMENT: PAIN_FUNCTIONAL_ASSESSMENT: 0-10

## 2024-04-25 NOTE — PROGRESS NOTES
Patient returns for follow-up.  He continues to have right groin pain.  He is now having some lower back pain as well but the biggest symptom comes from the right groin.  He walks with a limp.  He had an inguinal nerve block that did not help his symptoms.  Reports having had an intra-articular hip injection prior to surgery that did not help his symptoms either.    On exam he has definite irritation of the right hip joint with flexion and internal rotation.  No focal neurologic deficits.    X-rays and MRI of the lumbar spine show good decompression of the spinal nerve roots, no evidence of hardware failure or loosening.    Most of his symptoms are related directly to movement of the hip.  I strongly encouraged him to seek an intra-articular injection with Dr. Pantoja whom he is seeing in the near future.  I think it is worth a try at this point.    He can follow-up with me on an as-needed basis.

## 2024-04-29 ENCOUNTER — OFFICE VISIT (OUTPATIENT)
Dept: ORTHOPEDIC SURGERY | Facility: HOSPITAL | Age: 59
End: 2024-04-29
Payer: COMMERCIAL

## 2024-04-29 ENCOUNTER — HOSPITAL ENCOUNTER (OUTPATIENT)
Dept: RADIOLOGY | Facility: EXTERNAL LOCATION | Age: 59
Discharge: HOME | End: 2024-04-29

## 2024-04-29 VITALS — BODY MASS INDEX: 26.32 KG/M2 | WEIGHT: 188 LBS | HEIGHT: 71 IN

## 2024-04-29 DIAGNOSIS — M16.11 ARTHRITIS OF RIGHT HIP: Primary | ICD-10-CM

## 2024-04-29 PROCEDURE — 2500000005 HC RX 250 GENERAL PHARMACY W/O HCPCS: Performed by: EMERGENCY MEDICINE

## 2024-04-29 PROCEDURE — 20611 DRAIN/INJ JOINT/BURSA W/US: CPT | Mod: RT | Performed by: EMERGENCY MEDICINE

## 2024-04-29 PROCEDURE — 1036F TOBACCO NON-USER: CPT | Performed by: EMERGENCY MEDICINE

## 2024-04-29 PROCEDURE — 99214 OFFICE O/P EST MOD 30 MIN: CPT | Performed by: EMERGENCY MEDICINE

## 2024-04-29 PROCEDURE — 2500000004 HC RX 250 GENERAL PHARMACY W/ HCPCS (ALT 636 FOR OP/ED): Performed by: EMERGENCY MEDICINE

## 2024-04-29 RX ORDER — LIDOCAINE HYDROCHLORIDE 10 MG/ML
4 INJECTION INFILTRATION; PERINEURAL
Status: COMPLETED | OUTPATIENT
Start: 2024-04-29 | End: 2024-04-29

## 2024-04-29 RX ORDER — TRIAMCINOLONE ACETONIDE 40 MG/ML
80 INJECTION, SUSPENSION INTRA-ARTICULAR; INTRAMUSCULAR
Status: COMPLETED | OUTPATIENT
Start: 2024-04-29 | End: 2024-04-29

## 2024-04-29 RX ADMIN — LIDOCAINE HYDROCHLORIDE 4 ML: 10 INJECTION, SOLUTION INFILTRATION; PERINEURAL at 16:29

## 2024-04-29 RX ADMIN — TRIAMCINOLONE ACETONIDE 80 MG: 200 INJECTION, SUSPENSION INTRA-ARTICULAR; INTRAMUSCULAR at 16:29

## 2024-04-29 ASSESSMENT — PAIN - FUNCTIONAL ASSESSMENT: PAIN_FUNCTIONAL_ASSESSMENT: 0-10

## 2024-04-29 ASSESSMENT — PAIN SCALES - GENERAL: PAINLEVEL_OUTOF10: 3

## 2024-04-29 ASSESSMENT — PAIN DESCRIPTION - DESCRIPTORS: DESCRIPTORS: DULL;STABBING

## 2024-04-29 NOTE — PROGRESS NOTES
Subjective   Sharath Weiss is a 58 y.o. male who presents for Pain of the Right Hip    HPI    4/29/24: Patient returns today for evaluation for right hip pain.  We did perform a ilioinguinal nerve hydrodissection and corticosteroid injection for him on 3/21/2024.  He felt that this gave him approximately 15 to 20% of relief.  Since that visit, he does continue to have a deep aching groin sensation.  He does have an appointment with total joint replacement next month for second opinion.  In the meantime, he would like to discuss the option of a repeat intra-articular injection.    3/21/24: Patient returns today for reevaluation for right hip pain.  During his last visit, we discussed the option of a repeat intra-articular injection versus surgical consultation.  He has since followed up with Dr. Colby and there is uncertainty of the origin of his pain.  Therefore, he presents today for further evaluation and consideration for therapeutic and diagnostic injection therapies.  His primary complaint is radiation of pain along the medial groin and down the medial aspect of his thigh.    3/8/24: Patient returns today for right hip pain.  We did perform a right hip intra-articular injection for him on 8/25/2023.  Unfortunately, he did not have significant relief from this injection.  Since that time, he had an MRI performed network radiology showed signs concerning for possible underlying synovitis infection.  He then followed up and had lab work including CBC, CMP, ESR, and CRP.  Labs were quite reassuring.  However, he continues to have anterior hip and groin pain.  He would like to discuss further treatment options today.    8/25/23: This is a 59 yo M referred by Dr. Vanegas w/ R hip pain. He had ultrasound of R hip on 6/23/23 at [facility] which showed mild teninosis of the gluteal tendons without tear. First had pain about 3 months ago but was able to rehab it but had to take a break due to shoulder surgery. Over the past 10  "days, it has progressed. He has pain starting on his R hip and radiates down his medial thigh. No falls, just gradually developed pain. No back pain or sciatic pain.       ROS: All pertinent positive symptoms are included in the history of present illness.    All other systems have been reviewed and are negative and noncontributory to this patient's current ailments.    Objective     Vitals:    04/29/24 1541   Weight: 85.3 kg (188 lb)   Height: 1.803 m (5' 11\")       Physical Exam  General/Constitutional: No apparent distress. Well-nourished and well developed.  Head: Normocephalic, Atraumatic.   Eyes: EOMI.  Vascular: No edema, swelling or tenderness, except as noted in detailed exam.  Respiratory: Non-labored breathing.  Integumentary: No impressive skin lesions present, except as noted in detailed exam.  Neurological: Alert and oriented.  Psychological: Normal mood and affect.  Musculoskeletal: Normal, except as noted in detailed exam.     Right Hip: Appearance: Normal. Tenderness: None. ROM: Full. Motor: Normal. Special Tests: positive impingement test, positive Rosemary's test and positive Michael' test, but negative JIA test, no joint laxity, negative Sacroiliac Compression test and negative Straight Leg Raise.     Patient ID: Sharath Weiss is a 58 y.o. male.    L Inj/Asp: R hip joint on 4/29/2024 4:29 PM  Indications: pain  Details: 20 G needle, ultrasound-guided anterior approach  Medications: 80 mg triamcinolone acetonide 40 mg/mL; 4 mL lidocaine 10 mg/mL (1 %)  Outcome: tolerated well, no immediate complications  Procedure, treatment alternatives, risks and benefits explained, specific risks discussed. Consent was given by the patient. Immediately prior to procedure a time out was called to verify the correct patient, procedure, equipment, support staff and site/side marked as required. Patient was prepped and draped in the usual sterile fashion.           Assessment/Plan   Problem List Items Addressed This " Visit    None  Visit Diagnoses       Arthritis of right hip        Relevant Orders    Point of Care Ultrasound (Completed)          Discussed risks versus benefits of having injection performed. Patient has elected to proceed with procedure. Please refer to procedure note above. Discussed with patient to limit weightbearing activities over the next 24-48 hours, they can then progress to full activities as tolerated. Discussed with patient to avoid water submersion over the next 2 days. Discussed with patient to call me immediately if they develop worsening pain, rash, erythema, or fevers. Patient should follow-up with total joint replacement.  I am more than happy to continue injection therapy for him as long as he would like.  He is aware that he cannot have a total joint replacement within 3 months of an intra-articular injection.      Ishan Pantoja, DO  Sports Medicine  [facility], [facility] [place]    ** Please excuse any errors in grammar or translation related to this dictation. Voice recognition software was utilized to prepare this document. **

## 2024-05-02 ENCOUNTER — APPOINTMENT (OUTPATIENT)
Dept: ORTHOPEDIC SURGERY | Facility: HOSPITAL | Age: 59
End: 2024-05-02
Payer: COMMERCIAL

## 2024-05-15 ENCOUNTER — PATIENT MESSAGE (OUTPATIENT)
Dept: DERMATOLOGY | Facility: CLINIC | Age: 59
End: 2024-05-15
Payer: COMMERCIAL

## 2024-05-15 NOTE — TELEPHONE ENCOUNTER
From: Sharath Weiss  To: Jerome Cabrerama  Sent: 5/15/2024 11:12 AM EDT  Subject: Toenail infection    I left a message at the office today, but wanted to follow up with some details, including pictures. I’ve been utilizing the toenail fungus lacquer and thought the large toenail was under control but literally overnight. It started to turn blue green color and I’m worried about an infection. Would you be able to direct me in the proper course of action/medication. Thank you, Freddy, cell phone number is [phone].

## 2024-05-19 DIAGNOSIS — L60.8 GREEN NAILS: Primary | ICD-10-CM

## 2024-05-19 RX ORDER — GENTAMICIN SULFATE 3 MG/ML
SOLUTION/ DROPS OPHTHALMIC
Qty: 15 ML | Refills: 2 | Status: SHIPPED | OUTPATIENT
Start: 2024-05-19

## 2024-05-21 ENCOUNTER — HOSPITAL ENCOUNTER (OUTPATIENT)
Dept: RADIOLOGY | Facility: CLINIC | Age: 59
Discharge: HOME | End: 2024-05-21
Payer: COMMERCIAL

## 2024-05-21 ENCOUNTER — OFFICE VISIT (OUTPATIENT)
Dept: ORTHOPEDIC SURGERY | Facility: CLINIC | Age: 59
End: 2024-05-21
Payer: COMMERCIAL

## 2024-05-21 DIAGNOSIS — Z96.641 S/P TOTAL RIGHT HIP ARTHROPLASTY: ICD-10-CM

## 2024-05-21 DIAGNOSIS — M16.31 OSTEOARTHRITIS OF RIGHT HIP JOINT DUE TO DYSPLASIA: ICD-10-CM

## 2024-05-21 PROCEDURE — 73502 X-RAY EXAM HIP UNI 2-3 VIEWS: CPT | Mod: RIGHT SIDE | Performed by: RADIOLOGY

## 2024-05-21 PROCEDURE — 73502 X-RAY EXAM HIP UNI 2-3 VIEWS: CPT | Mod: RT

## 2024-05-21 PROCEDURE — 99214 OFFICE O/P EST MOD 30 MIN: CPT | Performed by: STUDENT IN AN ORGANIZED HEALTH CARE EDUCATION/TRAINING PROGRAM

## 2024-05-21 PROCEDURE — 1036F TOBACCO NON-USER: CPT | Performed by: STUDENT IN AN ORGANIZED HEALTH CARE EDUCATION/TRAINING PROGRAM

## 2024-05-22 NOTE — PROGRESS NOTES
PRIMARY CARE PHYSICIAN: Buster Barney DO  REFERRING PROVIDER: No referring provider defined for this encounter.       SUBJECTIVE  CHIEF COMPLAINT:   Chief Complaint   Patient presents with    Right Hip - Pain        HPI: Sharath Weiss is a pleasant 58 y.o. year-old male who is seen today for evaluation of right hip pain. The pain has been present for 4 years but has been severe for 9 months. The onset of pain was not associated with a traumatic injury.  Sharath Weiss states that the pain is located in the groin.  Sharath Weiss denies any history of inflammatory arthritis.  The pain is aggravated by activity and alleviated by nothing.     Initially PT helped his pain but the pain is now refractory to PT.     FUNCTIONAL STATUS: occasionally limited.  AMBULATORY STATUS: Independent community ambulation without devices  PREVIOUS TREATMENTS: physical therapy, activity modification, over the counter medications, and corticosteroid injections.  Intra-articular injections have not provided him with much relief.  He did get about 10 to 20% relief from an ilioinguinal nerve hydrodissection and corticosteroid injection.  HISTORY OF SURGERY ON AFFECTED HIP(S): No   BACK PAIN REPORTED: Hx of L2-L4 lateral lumbar fusion with Dr. Quiros. States that he has had numbness in the area of his pubic symphysis since surgery.  SYMPTOMS INTERFERING WITH SLEEP: Yes   INSTABILITY: Rare  IMPACTING QUALITY OF LIFE: Yes     REVIEW OF SYSTEMS  There has been no interval change in this patient's past medical, surgical, medications, allergies, family history or social history since the most recent visit to a provider within our department.  14 point review of systems was performed, reviewed, and negative except for pertinent positives documented in the history of present illness.    Past Medical History:   Diagnosis Date    BPH (benign prostatic hyperplasia)     Cervical disc disease     Discoid lupus erythematosus     Diverticulitis     s/p  bowel resection     Dry eyes     GERD (gastroesophageal reflux disease)     History of sepsis      - left knee    Hyperlipidemia     Hypertension     Insomnia     Irritable bowel syndrome     Lichen planus     Lumbar stenosis     Osteoarthritis     Spinal stenosis     Superficial phlebitis of arm     Tinnitus     Trigger finger of right thumb     Trigger finger of thumb     Urachal cyst     Vitamin D deficiency         Allergies   Allergen Reactions    Vancomycin Nausea/vomiting, Anxiety and Tinnitus     Other reaction(s): Intolerance   Tinnitus and shaking        Past Surgical History:   Procedure Laterality Date    ABSCESS DRAINAGE      abdominal    ANTERIOR CRUCIATE LIGAMENT REPAIR  2015    Primary Repair Of Knee Ligament Cruciate Anterior    BOWEL RESECTION      COLONOSCOPY  2022    Complete Colonoscopy    ELBOW FRACTURE SURGERY Left     KNEE ARTHROSCOPY W/ DEBRIDEMENT  2015    Knee Arthroscopy (Therapeutic)    KNEE ARTHROSCOPY W/ HARDWARE REMOVAL Left         LUMBAR FUSION  2015    Lumbar Vertebral Fusion    LUMBAR LAMINECTOMY  2015    Laminectomy Lumbar    OTHER SURGICAL HISTORY  2020    Epidural steroid injection    OTHER SURGICAL HISTORY  2018    Arthroscopy Shoulder Right x 2    SHOULDER ARTHROSCOPY Left     x 3    SPINAL FUSION  2023    UPPER GASTROINTESTINAL ENDOSCOPY          Family History   Problem Relation Name Age of Onset    Breast cancer Mother      Hypertension Father      Transient ischemic attack Father      Irritable bowel syndrome Sister          Social History     Socioeconomic History    Marital status:      Spouse name: Not on file    Number of children: Not on file    Years of education: Not on file    Highest education level: Not on file   Occupational History    Not on file   Tobacco Use    Smoking status: Former     Current packs/day: 0.00     Types: Cigarettes     Quit date:      Years since quittin.3      Passive exposure: Never    Smokeless tobacco: Never    Tobacco comments:     Pt denies any illness in past 30 days.      Denies SOB with stairs or activity.     Denies need for assist devices for ambulation.     Denies any personal or family reactions to anesthesia.   Vaping Use    Vaping status: Every Day    Substances: Nicotine   Substance and Sexual Activity    Alcohol use: Yes     Alcohol/week: 10.0 standard drinks of alcohol     Types: 10 Cans of beer per week     Comment: MODERATELY    Drug use: Never    Sexual activity: Defer   Other Topics Concern    Not on file   Social History Narrative    Not on file     Social Determinants of Health     Financial Resource Strain: Low Risk  (12/29/2023)    Overall Financial Resource Strain (CARDIA)     Difficulty of Paying Living Expenses: Not hard at all   Food Insecurity: Not on file   Transportation Needs: No Transportation Needs (12/29/2023)    PRAPARE - Transportation     Lack of Transportation (Medical): No     Lack of Transportation (Non-Medical): No   Physical Activity: Not on file   Stress: Not on file   Social Connections: Not on file   Intimate Partner Violence: Not on file   Housing Stability: Low Risk  (12/29/2023)    Housing Stability Vital Sign     Unable to Pay for Housing in the Last Year: No     Number of Places Lived in the Last Year: 1     Unstable Housing in the Last Year: No        CURRENT MEDICATIONS:   Current Outpatient Medications   Medication Sig Dispense Refill    azelastine (Astelin) 137 mcg (0.1 %) nasal spray Administer 1 spray into each nostril in the morning and 1 spray before bedtime. Use in each nostril as directed. 30 mL 2    ciclopirox 1 % shampoo Wet hair and apply shampoo to scalp. Lather and leave on for 3 minutes. Rinse. Do this twice a week at night. 120 mL 11    fluticasone (Flonase Sensimist) 27.5 mcg/actuation nasal spray Administer 1 spray into each nostril once daily as needed for rhinitis.      gentamicin (Garamycin) 0.3 %  ophthalmic solution Apply topically underneath affected nail plate(s) twice daily. 15 mL 2    HYDROcodone-acetaminophen (Norco) 5-325 mg tablet Take 1 tablet by mouth every 6 hours if needed for severe pain (7 - 10). 12 tablet 0    hydroxychloroquine (PlaqueniL) 200 mg tablet Take 1 tablet (200 mg) by mouth once daily. 30 tablet 2    ketoconazole (NIZOral) 2 % shampoo Apply 1 Application topically 2 times a week.      lidocaine-diphenhydrAMINE-Maalox 1:1:1 Magic Mouthwash Swish and swallow 30 mL 3 times a day as needed (severe bdominal pain). 300 mL 2    lisinopril 10 mg tablet Take 1 tablet (10 mg) by mouth once daily. 90 tablet 3    pantoprazole (ProtoNix) 40 mg EC tablet       rosuvastatin (Crestor) 10 mg tablet Take 1 tablet (10 mg) by mouth once daily. 90 tablet 2    tacrolimus (Protopic) 0.1 % ointment in the morning and at bedtime. APPLY AND GENTLY MASSAGE INTO AFFECTED AREA(S)      zolpidem (Ambien) 10 mg tablet Take 1 tablet (10 mg) by mouth as needed at bedtime for sleep. 90 tablet 1     No current facility-administered medications for this visit.        OBJECTIVE    PHYSICAL EXAM  There is no height or weight on file to calculate BMI.    General: Well-appearing male in no acute distress.  Awake, alert and oriented.  Pleasant and cooperative.  Respiratory: Non-labored breathing  Mood: Euthymic   Gait: Slight antalgia  Assistive Device: None     Limb Length Discrepancy: None    Affected Right Hip  Range of motion:   Flexion: 100  Extension: 0  Internal Rotation: 0  External Rotation: 5  Abduction: 10  Hip Flexor Strength: 5/5  Abductor Strength: 5/5  Adductor Strength: 5/5  Tenderness: None  Sensation: Intact to light touch distally  Motor function: Able to fire TA, EHL, G/S  Pulses: Palpable DP pulse    IMAGING:  AP pelvis, AP hip and false profile views: Independent review of right hip and pelvis x-rays was performed. The findings were reviewed with the patient.  The patient has a shallow and slightly  dysplastic acetabulum.  He does have a cam deformity of the femoral neck.  Patient also has a pincer lesion on the acetabular side.      ASSESSMENT & PLAN    IMPRESSION:  Sharath Weiss is a 58 y.o. male with several years of right groin pain.  His radiographs demonstrate some hip dysplasia with a cam and pincer lesion although, the majority of his joint space is well-maintained.    PLAN:  I had a very candid discussion with the patient.  He does have some irregularities on the plain film radiographs.  His MRI does also demonstrate some labral tearing.  However, he does have mostly preserved joint space on his plain film radiographs.    The pain is localized to the groin although he has not had any improvement with intra-articular injections.  I discussed with the patient that intra-articular injections are usually a good indicator of response to arthroplasty.  The fact that he has not had any improvement with 2 injections is concerning that he may not have any improvement with total hip arthroplasty.    However, the patient has been evaluated by our spine team and there does not appear to be any issue with his hardware nor with his lumbar decompression.  Therefore, it is reasonable to localize his pathology to the hip joint.    Given the failure of conservative treatment, I do believe it would be reasonable to move forward with total hip arthroplasty.  I believe his age and osteoarthritis preclude him from a hip arthroscopy.  Explained to the patient that    It is difficult for me to predict what degree of pain relief he will experience from total hip arthroplasty.  However, I do expect that he will have some improvement in his pain and function.  There may be other etiologies of his pain.    I did strongly recommend that the patient delay surgery as long as possible.  He was in agreement with this plan.  He will continue to monitor his symptoms and remain active.  I will see him for follow-up in the fall.  At that  point, we will repeat plain film radiographs.  Will also discuss moving forward with total joint arthroplasty or continued observation at that time.    *This note was created using voice recognition software and was not corrected for typographical or grammatical errors.*

## 2024-05-28 ENCOUNTER — OFFICE VISIT (OUTPATIENT)
Dept: DERMATOLOGY | Facility: CLINIC | Age: 59
End: 2024-05-28
Payer: COMMERCIAL

## 2024-05-28 DIAGNOSIS — A49.8 PSEUDOMONAS INFECTION: Primary | ICD-10-CM

## 2024-05-28 DIAGNOSIS — L21.9 SEBORRHEIC DERMATITIS: ICD-10-CM

## 2024-05-28 DIAGNOSIS — L93.0 DISCOID LUPUS ERYTHEMATOSUS: ICD-10-CM

## 2024-05-28 PROCEDURE — 99213 OFFICE O/P EST LOW 20 MIN: CPT

## 2024-05-28 PROCEDURE — 11900 INJECT SKIN LESIONS </W 7: CPT

## 2024-05-28 NOTE — PROGRESS NOTES
Subjective   HPI: Shraath Weiss is a 58 y.o. male who presents in office for evaluation and treatment of discoid lupus, seborrheic dermatitis, and Pseudomonas infection of nail.  Patient has restarted on his Plaquenil.  Experiencing no side effects.  Noticed discoloration of his right great toe a couple of weeks ago and MyChart message.  Gentamicin solution was sent.  Patient also saw his other dermatologist office and they prescribed ciprofloxacin.  Patient has not been using neither of the medications as he does not know which is better.    ROS: No other skin or systemic complaints other than what is documented elsewhere in the note.    ALLERGIES: Vancomycin    SOCIAL:  reports that he quit smoking about 6 years ago. His smoking use included cigarettes. He has never been exposed to tobacco smoke. He has never used smokeless tobacco. He reports current alcohol use of about 10.0 standard drinks of alcohol per week. He reports that he does not use drugs.    Objective   Numerous patches of hair loss that appear less inflamed than last time. There appears to be some hair growth and previous injected areas     Right Hallux Toenail  Blue-greenish tint underneath the right great toenail on the right lateral nail fold    Scalp  No acute skin findings today        Assessment/Plan   1. Pseudomonas infection  Right Hallux Toenail    Discussed with patient that clinically this is consistent with a Pseudomonas toenail infection.  I had sent gentamicin solution for patient however he had also contacted his other dermatologist and they sent for ciprofloxacin solution.  Discussed with patient that both of these topical antibiotics would cover for Pseudomonas.  He has picked both medications up.  I recommended using one of the medications until no discoloration is present.    2. Discoid lupus erythematosus    Patient has restarted taking the Plaquenil 200 mg tablet p.o. daily.  He also would like ILK injections.    Related  Medications  hydroxychloroquine (PlaqueniL) 200 mg tablet  Take 1 tablet (200 mg) by mouth once daily.    triamcinolone acetonide (Kenalog) injection 10 mg      3. Seborrheic dermatitis  Scalp    Continue ciclopirox shampoo    Related Medications  ciclopirox 1 % shampoo  Wet hair and apply shampoo to scalp. Lather and leave on for 3 minutes. Rinse. Do this twice a week at night.         FOLLOW UP: 4-6 weeks    The patient was encouraged to contact me with any further questions or concerns.  Hannah Holman PA-C  5/28/2024

## 2024-06-14 ENCOUNTER — LAB (OUTPATIENT)
Dept: LAB | Facility: LAB | Age: 59
End: 2024-06-14
Payer: COMMERCIAL

## 2024-06-14 ENCOUNTER — APPOINTMENT (OUTPATIENT)
Dept: DERMATOLOGY | Facility: CLINIC | Age: 59
End: 2024-06-14
Payer: COMMERCIAL

## 2024-06-14 DIAGNOSIS — M01.X11: Primary | ICD-10-CM

## 2024-06-14 DIAGNOSIS — M01.X11: ICD-10-CM

## 2024-06-14 LAB
BASOPHILS # BLD AUTO: 0.02 X10*3/UL (ref 0–0.1)
BASOPHILS NFR BLD AUTO: 0.2 %
CRP SERPL-MCNC: <0.1 MG/DL
EOSINOPHIL # BLD AUTO: 0.02 X10*3/UL (ref 0–0.7)
EOSINOPHIL NFR BLD AUTO: 0.2 %
ERYTHROCYTE [DISTWIDTH] IN BLOOD BY AUTOMATED COUNT: 12.3 % (ref 11.5–14.5)
ERYTHROCYTE [SEDIMENTATION RATE] IN BLOOD BY WESTERGREN METHOD: <1 MM/H (ref 0–20)
HCT VFR BLD AUTO: 42.2 % (ref 41–52)
HGB BLD-MCNC: 14 G/DL (ref 13.5–17.5)
IMM GRANULOCYTES # BLD AUTO: 0.11 X10*3/UL (ref 0–0.7)
IMM GRANULOCYTES NFR BLD AUTO: 1 % (ref 0–0.9)
LYMPHOCYTES # BLD AUTO: 1.49 X10*3/UL (ref 1.2–4.8)
LYMPHOCYTES NFR BLD AUTO: 13.2 %
MCH RBC QN AUTO: 30.7 PG (ref 26–34)
MCHC RBC AUTO-ENTMCNC: 33.2 G/DL (ref 32–36)
MCV RBC AUTO: 93 FL (ref 80–100)
MONOCYTES # BLD AUTO: 0.92 X10*3/UL (ref 0.1–1)
MONOCYTES NFR BLD AUTO: 8.1 %
NEUTROPHILS # BLD AUTO: 8.73 X10*3/UL (ref 1.2–7.7)
NEUTROPHILS NFR BLD AUTO: 77.3 %
NRBC BLD-RTO: 0 /100 WBCS (ref 0–0)
PLATELET # BLD AUTO: 235 X10*3/UL (ref 150–450)
RBC # BLD AUTO: 4.56 X10*6/UL (ref 4.5–5.9)
WBC # BLD AUTO: 11.3 X10*3/UL (ref 4.4–11.3)

## 2024-06-14 PROCEDURE — 85652 RBC SED RATE AUTOMATED: CPT

## 2024-06-14 PROCEDURE — 36415 COLL VENOUS BLD VENIPUNCTURE: CPT

## 2024-06-14 PROCEDURE — 85025 COMPLETE CBC W/AUTO DIFF WBC: CPT

## 2024-06-14 PROCEDURE — 86140 C-REACTIVE PROTEIN: CPT

## 2024-06-19 ENCOUNTER — OFFICE VISIT (OUTPATIENT)
Dept: ORTHOPEDIC SURGERY | Facility: CLINIC | Age: 59
End: 2024-06-19
Payer: COMMERCIAL

## 2024-06-19 ENCOUNTER — HOSPITAL ENCOUNTER (OUTPATIENT)
Dept: RADIOLOGY | Facility: EXTERNAL LOCATION | Age: 59
Discharge: HOME | End: 2024-06-19

## 2024-06-19 DIAGNOSIS — M16.31 OSTEOARTHRITIS OF RIGHT HIP JOINT DUE TO DYSPLASIA: ICD-10-CM

## 2024-06-19 DIAGNOSIS — M76.891 HIP FLEXOR TENDINITIS, RIGHT: Primary | ICD-10-CM

## 2024-06-19 DIAGNOSIS — M25.551 RIGHT HIP PAIN: ICD-10-CM

## 2024-06-19 PROCEDURE — 76942 ECHO GUIDE FOR BIOPSY: CPT | Performed by: EMERGENCY MEDICINE

## 2024-06-19 PROCEDURE — 1036F TOBACCO NON-USER: CPT | Performed by: EMERGENCY MEDICINE

## 2024-06-19 PROCEDURE — 20551 NJX 1 TENDON ORIGIN/INSJ: CPT | Performed by: EMERGENCY MEDICINE

## 2024-06-19 PROCEDURE — 99214 OFFICE O/P EST MOD 30 MIN: CPT | Performed by: EMERGENCY MEDICINE

## 2024-06-19 RX ORDER — TRIAMCINOLONE ACETONIDE 40 MG/ML
80 INJECTION, SUSPENSION INTRA-ARTICULAR; INTRAMUSCULAR
Status: COMPLETED | OUTPATIENT
Start: 2024-06-19 | End: 2024-06-19

## 2024-06-19 RX ORDER — CYCLOBENZAPRINE HCL 10 MG
10 TABLET ORAL 3 TIMES DAILY PRN
COMMUNITY

## 2024-06-19 RX ORDER — LIDOCAINE HYDROCHLORIDE 10 MG/ML
4 INJECTION INFILTRATION; PERINEURAL
Status: COMPLETED | OUTPATIENT
Start: 2024-06-19 | End: 2024-06-19

## 2024-06-19 ASSESSMENT — PAIN - FUNCTIONAL ASSESSMENT: PAIN_FUNCTIONAL_ASSESSMENT: 0-10

## 2024-06-19 ASSESSMENT — PAIN SCALES - GENERAL: PAINLEVEL_OUTOF10: 5 - MODERATE PAIN

## 2024-06-19 NOTE — PROGRESS NOTES
Subjective    Patient ID: Sharath Weiss is a 58 y.o. male.    Chief Complaint: Pain of the Right Hip (Dr. Pantoja patient would like to get CSI.  10 days ago pain was worse as it ever been.  He had a hip injection in the beginning of the year and it didn't do much and they wanted to try injections in tendon per patients notes that he brought with him to apt. )     Last Surgery: No surgery found  Last Surgery Date: No surgery found    Antelmo is a very pleasant 58-year-old male who is coming in with acute on chronic right hip pain.  He has been seen by multiple providers and has had ultrasounds, x-rays, and an MRI all done that have demonstrated hip dysplasia with degenerative changes, labral tearing with a paralabral cyst, possible gluteal tendinitis/tendinosis, and he has also had multiple injections even with a ultrasound-guided nerve hydrodissection earlier this year in March.  Dr. Pantoja is out of the office and the patient is coming in to follow-up with me because he had acute worsening of his pain about 10 days ago where he was barely able to walk.  Recently, he was seen by Dr. Alfie Vasquez who recommended a right hip joint replacement possibly later this year in the fall.  Everyone is a little concerned about doing a hip replacement since intra-articular cortisone injections have not provided him with much relief.  He is here today curious as to whether or not a right hip flexor tendon cortisone injection would provide him with any benefit.        Objective   Right Hip Exam     Tenderness   The patient is experiencing tenderness in the anterior.    Muscle Strength   The patient has normal right hip strength.    Tests   JIA: positive    Other   Erythema: absent  Sensation: normal  Pulse: present    Comments:  Mild discomfort with palpation over the greater trochanter.  Most of his pain and tenderness are along the hip flexor area and radiate deep into the groin.  Logroll negative.  Range of motion and  strength testing seem to be intact.  Right hip flexion against resistance does reproduce his pain.      Left Hip Exam   Left hip exam is normal.            Image Results:  Point of Care Ultrasound  These images are not reportable by radiology and will not be interpreted   by  Radiologists.    Prior x-rays of the right hip and pelvis were reviewed and interpreted by me and did not reveal any acute injuries or fractures.  Hip dysplasia.    Patient ID: Sharath Weiss is a 58 y.o. male.    Tendon Sheath Injection (Right iliopsoas tendon) on 6/19/2024 3:43 PM  Indications: pain, diagnostic and therapeutic benefit  Details: 20 G needle, ultrasound-guided anterior approach  Medications: 80 mg triamcinolone acetonide 40 mg/mL; 4 mL lidocaine 10 mg/mL (1 %)  Procedure, treatment alternatives, risks and benefits explained, specific risks discussed. Consent was given by the patient. Immediately prior to procedure a time out was called to verify the correct patient, procedure, equipment, support staff and site/side marked as required. Patient was prepped and draped in the usual sterile fashion.             Assessment/Plan   Encounter Diagnoses:  Hip flexor tendinitis, right    Right hip pain    Osteoarthritis of right hip joint due to dysplasia    Orders Placed This Encounter    Tendon Sheath Injection    Point of Care Ultrasound    Referral to Physical Therapy     No follow-ups on file.    We discussed the treatment options and agreed to perform a diagnostic and hopefully therapeutic cortisone injection under ultrasound guidance of his right hip flexor tendon sheath.  The patient tolerated the procedure well without any complications and activity modifications were reviewed.  I updated his PT order to include his right hip flexor and he can follow-up with me as needed moving forward.    ** Please excuse any errors in grammar or translation related to this dictation. Voice recognition software was utilized to prepare this  document. **       Delfin Barrett MD  [facility]

## 2024-06-28 ENCOUNTER — APPOINTMENT (OUTPATIENT)
Dept: DERMATOLOGY | Facility: CLINIC | Age: 59
End: 2024-06-28
Payer: COMMERCIAL

## 2024-07-01 ENCOUNTER — TELEPHONE (OUTPATIENT)
Dept: ORTHOPEDIC SURGERY | Facility: HOSPITAL | Age: 59
End: 2024-07-01
Payer: COMMERCIAL

## 2024-07-01 DIAGNOSIS — M16.31 OSTEOARTHRITIS OF RIGHT HIP JOINT DUE TO DYSPLASIA: Primary | ICD-10-CM

## 2024-07-01 NOTE — PROGRESS NOTES
My telephone encounter was closed before I was able to submit the order for labs.  Please see previous telephone encounter for documentation.  Infection labs ordered.

## 2024-07-01 NOTE — TELEPHONE ENCOUNTER
Sharath Weiss contacted my office due to progressively worsening right hip pain.  Over the past 2 weeks, he has had severe pain throughout the entire hip.  He is concerned about an infection.  He did have an injection into his tendon sheath which did not provide him with any relief.    We will obtain labs tomorrow.  He will then see me on Wednesday morning for further evaluation.

## 2024-07-02 ENCOUNTER — LAB (OUTPATIENT)
Dept: LAB | Facility: LAB | Age: 59
End: 2024-07-02
Payer: COMMERCIAL

## 2024-07-02 ENCOUNTER — APPOINTMENT (OUTPATIENT)
Dept: DERMATOLOGY | Facility: CLINIC | Age: 59
End: 2024-07-02
Payer: COMMERCIAL

## 2024-07-02 DIAGNOSIS — M16.31 OSTEOARTHRITIS OF RIGHT HIP JOINT DUE TO DYSPLASIA: ICD-10-CM

## 2024-07-02 DIAGNOSIS — A49.8 PSEUDOMONAS INFECTION: ICD-10-CM

## 2024-07-02 DIAGNOSIS — L93.0 DISCOID LUPUS ERYTHEMATOSUS: ICD-10-CM

## 2024-07-02 DIAGNOSIS — L21.9 SEBORRHEIC DERMATITIS: ICD-10-CM

## 2024-07-02 LAB
BASOPHILS # BLD AUTO: 0.02 X10*3/UL (ref 0–0.1)
BASOPHILS NFR BLD AUTO: 0.3 %
CRP SERPL-MCNC: <0.1 MG/DL
EOSINOPHIL # BLD AUTO: 0.03 X10*3/UL (ref 0–0.7)
EOSINOPHIL NFR BLD AUTO: 0.4 %
ERYTHROCYTE [DISTWIDTH] IN BLOOD BY AUTOMATED COUNT: 12.9 % (ref 11.5–14.5)
ERYTHROCYTE [SEDIMENTATION RATE] IN BLOOD BY WESTERGREN METHOD: <1 MM/H (ref 0–20)
HCT VFR BLD AUTO: 41.9 % (ref 41–52)
HGB BLD-MCNC: 13.8 G/DL (ref 13.5–17.5)
IMM GRANULOCYTES # BLD AUTO: 0.11 X10*3/UL (ref 0–0.7)
IMM GRANULOCYTES NFR BLD AUTO: 1.4 % (ref 0–0.9)
LYMPHOCYTES # BLD AUTO: 1.62 X10*3/UL (ref 1.2–4.8)
LYMPHOCYTES NFR BLD AUTO: 21.2 %
MCH RBC QN AUTO: 30.7 PG (ref 26–34)
MCHC RBC AUTO-ENTMCNC: 32.9 G/DL (ref 32–36)
MCV RBC AUTO: 93 FL (ref 80–100)
MONOCYTES # BLD AUTO: 0.56 X10*3/UL (ref 0.1–1)
MONOCYTES NFR BLD AUTO: 7.3 %
NEUTROPHILS # BLD AUTO: 5.3 X10*3/UL (ref 1.2–7.7)
NEUTROPHILS NFR BLD AUTO: 69.4 %
NRBC BLD-RTO: 0 /100 WBCS (ref 0–0)
PLATELET # BLD AUTO: 238 X10*3/UL (ref 150–450)
RBC # BLD AUTO: 4.49 X10*6/UL (ref 4.5–5.9)
WBC # BLD AUTO: 7.6 X10*3/UL (ref 4.4–11.3)

## 2024-07-02 PROCEDURE — 36415 COLL VENOUS BLD VENIPUNCTURE: CPT

## 2024-07-02 PROCEDURE — 85652 RBC SED RATE AUTOMATED: CPT

## 2024-07-02 PROCEDURE — 86140 C-REACTIVE PROTEIN: CPT

## 2024-07-02 PROCEDURE — 85025 COMPLETE CBC W/AUTO DIFF WBC: CPT

## 2024-07-03 ENCOUNTER — HOSPITAL ENCOUNTER (OUTPATIENT)
Dept: RADIOLOGY | Facility: CLINIC | Age: 59
Discharge: HOME | End: 2024-07-03
Payer: COMMERCIAL

## 2024-07-03 ENCOUNTER — OFFICE VISIT (OUTPATIENT)
Dept: ORTHOPEDIC SURGERY | Facility: CLINIC | Age: 59
End: 2024-07-03
Payer: COMMERCIAL

## 2024-07-03 VITALS — BODY MASS INDEX: 26.32 KG/M2 | HEIGHT: 71 IN | WEIGHT: 188 LBS

## 2024-07-03 DIAGNOSIS — M16.31 OSTEOARTHRITIS OF RIGHT HIP JOINT DUE TO DYSPLASIA: ICD-10-CM

## 2024-07-03 DIAGNOSIS — M16.31 OSTEOARTHRITIS OF RIGHT HIP JOINT DUE TO DYSPLASIA: Primary | ICD-10-CM

## 2024-07-03 PROCEDURE — 73502 X-RAY EXAM HIP UNI 2-3 VIEWS: CPT | Mod: RIGHT SIDE | Performed by: RADIOLOGY

## 2024-07-03 PROCEDURE — 1036F TOBACCO NON-USER: CPT | Performed by: STUDENT IN AN ORGANIZED HEALTH CARE EDUCATION/TRAINING PROGRAM

## 2024-07-03 PROCEDURE — 99214 OFFICE O/P EST MOD 30 MIN: CPT | Performed by: STUDENT IN AN ORGANIZED HEALTH CARE EDUCATION/TRAINING PROGRAM

## 2024-07-03 PROCEDURE — 73502 X-RAY EXAM HIP UNI 2-3 VIEWS: CPT | Mod: RT

## 2024-07-03 ASSESSMENT — PAIN - FUNCTIONAL ASSESSMENT: PAIN_FUNCTIONAL_ASSESSMENT: 0-10

## 2024-07-03 ASSESSMENT — PAIN DESCRIPTION - DESCRIPTORS: DESCRIPTORS: ACHING

## 2024-07-03 ASSESSMENT — PAIN SCALES - GENERAL: PAINLEVEL_OUTOF10: 5 - MODERATE PAIN

## 2024-07-03 NOTE — PROGRESS NOTES
PRIMARY CARE PHYSICIAN: Buster Barney DO  REFERRING PROVIDER: No referring provider defined for this encounter.       SUBJECTIVE  CHIEF COMPLAINT:   Chief Complaint   Patient presents with    Right Hip - Pain        HPI: Sharath Weiss is a pleasant 58 y.o. year-old male who is seen today for evaluation of right hip pain. The pain has been present for 4 years but has been severe for 9 months. The onset of pain was not associated with a traumatic injury.  Sharath Weiss states that the pain is located in the groin.  Sharath Weiss denies any history of inflammatory arthritis.  The pain is aggravated by activity and alleviated by nothing.     Since his last office visit with us, he received a right hip flexor tendon sheath steroid injection with Dr. Barrett on 6/19/2024 which he states provided no relief at all, even during the first couple hours after injection. Prior to 3 weeks ago, he felt like his pain was somewhat manageable but since 3 weeks, he noticed a steady increase in pain and decline in daily function. The past couple days, his pain was severe to the point that he had to use a wheelchair for ambulation. He presents today to discuss potential surgical management.     Initially PT helped his pain but the pain is now refractory to PT.     FUNCTIONAL STATUS: limited:  difficulty performing activities of daily living.  AMBULATORY STATUS: Independent community ambulation without devices  PREVIOUS TREATMENTS: physical therapy, activity modification, over the counter medications, and corticosteroid injections.  Intra-articular and tendon sheath injections have not provided him with much relief.  He did get about 10 to 20% relief from an ilioinguinal nerve hydrodissection and corticosteroid injection.  HISTORY OF SURGERY ON AFFECTED HIP(S): No   BACK PAIN REPORTED: Hx of L2-L4 lateral lumbar fusion with Dr. Quiros. States that he has had numbness in the area of his pubic symphysis since surgery.  SYMPTOMS  INTERFERING WITH SLEEP: Yes   INSTABILITY: Rare  IMPACTING QUALITY OF LIFE: Yes     REVIEW OF SYSTEMS  There has been no interval change in this patient's past medical, surgical, medications, allergies, family history or social history since the most recent visit to a provider within our department.  14 point review of systems was performed, reviewed, and negative except for pertinent positives documented in the history of present illness.    Past Medical History:   Diagnosis Date    BPH (benign prostatic hyperplasia)     Cervical disc disease     Discoid lupus erythematosus     Diverticulitis     s/p bowel resection 2021    Dry eyes     GERD (gastroesophageal reflux disease)     History of sepsis     2020 - left knee    Hyperlipidemia     Hypertension     Insomnia     Irritable bowel syndrome     Lichen planus     Lumbar stenosis     Osteoarthritis     Spinal stenosis     Superficial phlebitis of arm     Tinnitus     Trigger finger of right thumb     Trigger finger of thumb     Urachal cyst     Vitamin D deficiency         Allergies   Allergen Reactions    Vancomycin Nausea/vomiting, Anxiety and Tinnitus     Other reaction(s): Intolerance   Tinnitus and shaking        Past Surgical History:   Procedure Laterality Date    ABSCESS DRAINAGE  2022    abdominal    ANTERIOR CRUCIATE LIGAMENT REPAIR  04/24/2015    Primary Repair Of Knee Ligament Cruciate Anterior    BOWEL RESECTION      COLONOSCOPY  11/02/2022    Complete Colonoscopy    ELBOW FRACTURE SURGERY Left     KNEE ARTHROSCOPY W/ DEBRIDEMENT  04/24/2015    Knee Arthroscopy (Therapeutic)    KNEE ARTHROSCOPY W/ HARDWARE REMOVAL Left     2015    LUMBAR FUSION  04/24/2015    Lumbar Vertebral Fusion    LUMBAR LAMINECTOMY  04/24/2015    Laminectomy Lumbar    OTHER SURGICAL HISTORY  07/13/2020    Epidural steroid injection    OTHER SURGICAL HISTORY  02/22/2018    Arthroscopy Shoulder Right x 2    SHOULDER ARTHROSCOPY Left     x 3    SPINAL FUSION  12/2023    UPPER  GASTROINTESTINAL ENDOSCOPY          Family History   Problem Relation Name Age of Onset    Breast cancer Mother      Hypertension Father      Transient ischemic attack Father      Irritable bowel syndrome Sister          Social History     Socioeconomic History    Marital status:      Spouse name: Not on file    Number of children: Not on file    Years of education: Not on file    Highest education level: Not on file   Occupational History    Not on file   Tobacco Use    Smoking status: Former     Current packs/day: 0.00     Types: Cigarettes     Quit date:      Years since quittin.5     Passive exposure: Never    Smokeless tobacco: Never    Tobacco comments:     Pt denies any illness in past 30 days.      Denies SOB with stairs or activity.     Denies need for assist devices for ambulation.     Denies any personal or family reactions to anesthesia.   Vaping Use    Vaping status: Every Day    Substances: Nicotine   Substance and Sexual Activity    Alcohol use: Yes     Alcohol/week: 10.0 standard drinks of alcohol     Types: 10 Cans of beer per week     Comment: MODERATELY    Drug use: Never    Sexual activity: Defer   Other Topics Concern    Not on file   Social History Narrative    Not on file     Social Determinants of Health     Financial Resource Strain: Low Risk  (2023)    Overall Financial Resource Strain (CARDIA)     Difficulty of Paying Living Expenses: Not hard at all   Food Insecurity: Not on file   Transportation Needs: No Transportation Needs (2023)    PRAPARE - Transportation     Lack of Transportation (Medical): No     Lack of Transportation (Non-Medical): No   Physical Activity: Not on file   Stress: Not on file   Social Connections: Not on file   Intimate Partner Violence: Not on file   Housing Stability: Low Risk  (2023)    Housing Stability Vital Sign     Unable to Pay for Housing in the Last Year: No     Number of Places Lived in the Last Year: 1     Unstable  Housing in the Last Year: No        CURRENT MEDICATIONS:   Current Outpatient Medications   Medication Sig Dispense Refill    azelastine (Astelin) 137 mcg (0.1 %) nasal spray Administer 1 spray into each nostril in the morning and 1 spray before bedtime. Use in each nostril as directed. 30 mL 2    ciclopirox 1 % shampoo Wet hair and apply shampoo to scalp. Lather and leave on for 3 minutes. Rinse. Do this twice a week at night. 120 mL 11    cyclobenzaprine (Flexeril) 10 mg tablet Take 1 tablet (10 mg) by mouth 3 times a day as needed for muscle spasms.      fluticasone (Flonase Sensimist) 27.5 mcg/actuation nasal spray Administer 1 spray into each nostril once daily as needed for rhinitis.      gentamicin (Garamycin) 0.3 % ophthalmic solution Apply topically underneath affected nail plate(s) twice daily. 15 mL 2    HYDROcodone-acetaminophen (Norco) 5-325 mg tablet Take 1 tablet by mouth every 6 hours if needed for severe pain (7 - 10). 12 tablet 0    hydroxychloroquine (PlaqueniL) 200 mg tablet Take 1 tablet (200 mg) by mouth once daily. 30 tablet 2    ketoconazole (NIZOral) 2 % shampoo Apply 1 Application topically 2 times a week.      lidocaine-diphenhydrAMINE-Maalox 1:1:1 Magic Mouthwash Swish and swallow 30 mL 3 times a day as needed (severe bdominal pain). 300 mL 2    lisinopril 10 mg tablet Take 1 tablet (10 mg) by mouth once daily. 90 tablet 3    pantoprazole (ProtoNix) 40 mg EC tablet       rosuvastatin (Crestor) 10 mg tablet Take 1 tablet (10 mg) by mouth once daily. 90 tablet 2    tacrolimus (Protopic) 0.1 % ointment in the morning and at bedtime. APPLY AND GENTLY MASSAGE INTO AFFECTED AREA(S)      zolpidem (Ambien) 10 mg tablet Take 1 tablet (10 mg) by mouth as needed at bedtime for sleep. 90 tablet 1     No current facility-administered medications for this visit.        OBJECTIVE    PHYSICAL EXAM  Body mass index is 26.22 kg/m².    General: Well-appearing male in no acute distress.  Awake, alert and  oriented.  Pleasant and cooperative.  Respiratory: Non-labored breathing  Mood: Euthymic   Gait: Slight antalgia  Assistive Device: None     Limb Length Discrepancy: None    Affected Right Hip  Range of motion:   Flexion: 100  Extension: 0  Internal Rotation: 5  External Rotation: 25  Abduction: 20  Hip Flexor Strength: 5/5  Abductor Strength: 5/5  Adductor Strength: 5/5  Tenderness: None  Sensation: Intact to light touch distally  Motor function: Able to fire TA, EHL, G/S  Pulses: Palpable DP pulse    IMAGING:  AP pelvis, AP hip and false profile views: Independent review of right hip and pelvis x-rays was performed. The findings were reviewed with the patient.  The patient has a shallow and slightly dysplastic acetabulum.  He does have a cam deformity of the femoral neck.  Patient also has a pincer lesion on the acetabular side. He has end stage osteoarthritis of that hip, notably worse since prior XR approximately 6 weeks ago with increased joint space narrowing.    Lab testing: ESR and CRP within normal limits.    ASSESSMENT & PLAN    IMPRESSION:  Sharath Weiss is a 58 y.o. male with several years of right groin pain.  His radiographs demonstrate some hip dysplasia with a cam and pincer lesion and there is increased interval joint space narrowing since his prior XR 6 weeks ago.     PLAN:  The patient has tried multiple modalities of conservative management with interval acute worsening of his symptoms the past few months. We had a lengthy discussion regarding the risks and benefits of a total hip replacement, the recovery process, and the clinical progression of his symptoms following the surgery. Given the acute decline in quality of life and acutely worse radiographic findings of osteoarthritis and failure of conservative management, we discussed that he would be a good candidate for a total hip replacement.     We encouraged the patient to follow up with his spine team (Dr. Quiros) to discuss that he will be  getting a total hip replacement and to evaluate for any residual spinal pathology that may be attributing to his symptoms that is not otherwise explained by his hip osteoarthritis. We also discussed that he should follow up with his primary care provider regarding potential labwork his provider may want prior to the surgery.     He recently received a steroid injection on 6/19/2024 so he will need to wait at least 3 months after that date before he can undergo surgery.     Our office will contact him some time next week to set up the surgery date and preoperative office visit. At the preoperative office visit, he will need repeat XR R hip with pelvis views.     The patient is agreeable to this plan and all questions were answered.     *This note was created using voice recognition software and was not corrected for typographical or grammatical errors.*

## 2024-07-08 ENCOUNTER — HOSPITAL ENCOUNTER (OUTPATIENT)
Facility: HOSPITAL | Age: 59
Setting detail: OUTPATIENT SURGERY
End: 2024-07-08
Attending: STUDENT IN AN ORGANIZED HEALTH CARE EDUCATION/TRAINING PROGRAM | Admitting: STUDENT IN AN ORGANIZED HEALTH CARE EDUCATION/TRAINING PROGRAM
Payer: COMMERCIAL

## 2024-07-08 DIAGNOSIS — M16.11 PRIMARY OSTEOARTHRITIS OF RIGHT HIP: ICD-10-CM

## 2024-07-17 ENCOUNTER — OFFICE VISIT (OUTPATIENT)
Dept: ORTHOPEDIC SURGERY | Facility: CLINIC | Age: 59
End: 2024-07-17
Payer: COMMERCIAL

## 2024-07-17 VITALS — WEIGHT: 188 LBS | BODY MASS INDEX: 26.32 KG/M2 | HEIGHT: 71 IN

## 2024-07-17 DIAGNOSIS — M76.891 HIP FLEXOR TENDINITIS, RIGHT: Primary | ICD-10-CM

## 2024-07-17 PROCEDURE — 99212 OFFICE O/P EST SF 10 MIN: CPT | Performed by: ORTHOPAEDIC SURGERY

## 2024-07-17 PROCEDURE — 3008F BODY MASS INDEX DOCD: CPT | Performed by: ORTHOPAEDIC SURGERY

## 2024-07-17 NOTE — PROGRESS NOTES
Patient returns for follow-up.  He is scheduled to have hip surgery with Dr. Alfie Vasquez.  He was sent back to me just to evaluate his spine once again to make sure there is no further intervention necessary.    I reviewed all of his imaging.  His fusion is well-healed and there is no residual stenosis.  I recommended continued treatment of his right hip which is clearly the problem, he is walking with a substantial limp today and this is not typical for spinal pathology whatsoever.    From my end he can proceed as planned, he does not need any further spine intervention.

## 2024-07-23 ENCOUNTER — APPOINTMENT (OUTPATIENT)
Dept: OTOLARYNGOLOGY | Facility: CLINIC | Age: 59
End: 2024-07-23
Payer: COMMERCIAL

## 2024-07-24 ENCOUNTER — OFFICE VISIT (OUTPATIENT)
Dept: ORTHOPEDIC SURGERY | Facility: HOSPITAL | Age: 59
End: 2024-07-24
Payer: COMMERCIAL

## 2024-07-24 ENCOUNTER — HOSPITAL ENCOUNTER (OUTPATIENT)
Dept: RADIOLOGY | Facility: HOSPITAL | Age: 59
Discharge: HOME | End: 2024-07-24
Payer: COMMERCIAL

## 2024-07-24 DIAGNOSIS — M25.511 RIGHT SHOULDER PAIN, UNSPECIFIED CHRONICITY: ICD-10-CM

## 2024-07-24 DIAGNOSIS — M25.512 LEFT SHOULDER PAIN: ICD-10-CM

## 2024-07-24 DIAGNOSIS — M25.512 LEFT SHOULDER PAIN, UNSPECIFIED CHRONICITY: ICD-10-CM

## 2024-07-24 PROCEDURE — 73030 X-RAY EXAM OF SHOULDER: CPT | Mod: LT

## 2024-07-24 PROCEDURE — 99213 OFFICE O/P EST LOW 20 MIN: CPT | Performed by: ORTHOPAEDIC SURGERY

## 2024-07-24 PROCEDURE — 73030 X-RAY EXAM OF SHOULDER: CPT | Mod: LEFT SIDE | Performed by: RADIOLOGY

## 2024-07-24 NOTE — PROGRESS NOTES
Patient is here for third opinion about his shoulder he has had at least 3 rotator cuff surgeries most recently done by very expert surgeon who knowledge the it cannot be fixed at this point he had a follow-up MRI scan 14 months after the surgery and has residual tearing of the cuff.  He did have another opinion and then recommended reverse arthroplasty.  He is here for another opinion regarding the shoulder.    The patient is pleasant and cooperative.  The patient is alert and oriented ×3.  Auditory function is intact.  The patient is a good historian.  The patient is not in acute distress.  Eye exam significant for nonicteric sclera, intact ocular muscle movement.  Breathing is rhythmic symmetric and nonlabored.  Patient's left radial pulses easily palpable he has brisk capillary refills light touch sensation is grossly intact over the left upper extremity can actively elevate to 150 degrees and maintain that against resistance grade 4/5 external and internal rotation also 4/5.  There is pain on active motion pain with passive elevation.    X-rays demonstrate preservation of the subacromial glenohumeral spaces.  Evidence of previous rotator cuff surgery noted on the greater tuberosity.  Slight acromioclavicular arthritis noted incidentally.    MRI scan review significant for large supraspinatus and infraspinatus tendon tear.  The actual scan is not available.    Recurrent rotator cuff tear    Patient is an athletic male with recurrent rotator cuff tear likely irreparable.  We detailed discussion about treatment alternatives including surgical options.  Surgical options include reverse arthroplasty with tendon transfer.  I have recommended the patient see my colleague Dr. Jared Leavitt for discussion of possible alternatives including tendon transfer before committing to a reverse arthroplasty.  Will help arrange for this consultation as soon as possible.    This was dictated using voice recognition software and not  corrected for grammatical or spelling errors.

## 2024-08-02 ENCOUNTER — APPOINTMENT (OUTPATIENT)
Dept: OPHTHALMOLOGY | Facility: CLINIC | Age: 59
End: 2024-08-02
Payer: COMMERCIAL

## 2024-08-05 ENCOUNTER — OFFICE (OUTPATIENT)
Dept: URBAN - METROPOLITAN AREA CLINIC 26 | Facility: CLINIC | Age: 59
End: 2024-08-05
Payer: COMMERCIAL

## 2024-08-05 VITALS
TEMPERATURE: 99 F | HEIGHT: 71 IN | WEIGHT: 182 LBS | HEART RATE: 91 BPM | DIASTOLIC BLOOD PRESSURE: 80 MMHG | SYSTOLIC BLOOD PRESSURE: 140 MMHG

## 2024-08-05 DIAGNOSIS — R14.0 ABDOMINAL DISTENSION (GASEOUS): ICD-10-CM

## 2024-08-05 DIAGNOSIS — R19.4 CHANGE IN BOWEL HABIT: ICD-10-CM

## 2024-08-05 DIAGNOSIS — K21.9 GASTRO-ESOPHAGEAL REFLUX DISEASE WITHOUT ESOPHAGITIS: ICD-10-CM

## 2024-08-05 DIAGNOSIS — R10.813 RIGHT LOWER QUADRANT ABDOMINAL TENDERNESS: ICD-10-CM

## 2024-08-05 PROCEDURE — 99214 OFFICE O/P EST MOD 30 MIN: CPT | Performed by: NURSE PRACTITIONER

## 2024-08-05 RX ORDER — PANTOPRAZOLE 40 MG/1
TABLET, DELAYED RELEASE ORAL
Qty: 90 | Refills: 1 | Status: ACTIVE

## 2024-08-09 DIAGNOSIS — L60.8 GREEN NAILS: ICD-10-CM

## 2024-08-09 RX ORDER — GENTAMICIN SULFATE 3 MG/ML
SOLUTION/ DROPS OPHTHALMIC
Qty: 15 ML | Refills: 2 | Status: SHIPPED | OUTPATIENT
Start: 2024-08-09

## 2024-08-12 ENCOUNTER — HOSPITAL ENCOUNTER (EMERGENCY)
Facility: HOSPITAL | Age: 59
Discharge: HOME | End: 2024-08-12
Attending: EMERGENCY MEDICINE
Payer: COMMERCIAL

## 2024-08-12 ENCOUNTER — APPOINTMENT (OUTPATIENT)
Dept: RADIOLOGY | Facility: HOSPITAL | Age: 59
End: 2024-08-12
Payer: COMMERCIAL

## 2024-08-12 VITALS
TEMPERATURE: 98.8 F | HEIGHT: 71 IN | RESPIRATION RATE: 16 BRPM | SYSTOLIC BLOOD PRESSURE: 145 MMHG | HEART RATE: 79 BPM | DIASTOLIC BLOOD PRESSURE: 80 MMHG | WEIGHT: 176 LBS | OXYGEN SATURATION: 97 % | BODY MASS INDEX: 24.64 KG/M2

## 2024-08-12 DIAGNOSIS — R10.9 ABDOMINAL PAIN, UNSPECIFIED ABDOMINAL LOCATION: Primary | ICD-10-CM

## 2024-08-12 LAB
ALBUMIN SERPL BCP-MCNC: 4.7 G/DL (ref 3.4–5)
ALP SERPL-CCNC: 56 U/L (ref 33–120)
ALT SERPL W P-5'-P-CCNC: 17 U/L (ref 10–52)
ANION GAP SERPL CALC-SCNC: 12 MMOL/L (ref 10–20)
APPEARANCE UR: CLEAR
AST SERPL W P-5'-P-CCNC: 16 U/L (ref 9–39)
BASOPHILS # BLD AUTO: 0.04 X10*3/UL (ref 0–0.1)
BASOPHILS NFR BLD AUTO: 0.5 %
BILIRUB SERPL-MCNC: 0.5 MG/DL (ref 0–1.2)
BILIRUB UR STRIP.AUTO-MCNC: NEGATIVE MG/DL
BUN SERPL-MCNC: 7 MG/DL (ref 6–23)
CALCIUM SERPL-MCNC: 9.5 MG/DL (ref 8.6–10.3)
CHLORIDE SERPL-SCNC: 102 MMOL/L (ref 98–107)
CO2 SERPL-SCNC: 28 MMOL/L (ref 21–32)
COLOR UR: NORMAL
CREAT SERPL-MCNC: 1.11 MG/DL (ref 0.5–1.3)
EGFRCR SERPLBLD CKD-EPI 2021: 77 ML/MIN/1.73M*2
EOSINOPHIL # BLD AUTO: 0.02 X10*3/UL (ref 0–0.7)
EOSINOPHIL NFR BLD AUTO: 0.3 %
ERYTHROCYTE [DISTWIDTH] IN BLOOD BY AUTOMATED COUNT: 12.9 % (ref 11.5–14.5)
GLUCOSE SERPL-MCNC: 93 MG/DL (ref 74–99)
GLUCOSE UR STRIP.AUTO-MCNC: NORMAL MG/DL
HCT VFR BLD AUTO: 40.4 % (ref 41–52)
HGB BLD-MCNC: 13.5 G/DL (ref 13.5–17.5)
IMM GRANULOCYTES # BLD AUTO: 0.04 X10*3/UL (ref 0–0.7)
IMM GRANULOCYTES NFR BLD AUTO: 0.5 % (ref 0–0.9)
KETONES UR STRIP.AUTO-MCNC: NEGATIVE MG/DL
LACTATE SERPL-SCNC: 1.1 MMOL/L (ref 0.4–2)
LEUKOCYTE ESTERASE UR QL STRIP.AUTO: NEGATIVE
LIPASE SERPL-CCNC: 31 U/L (ref 9–82)
LYMPHOCYTES # BLD AUTO: 1.12 X10*3/UL (ref 1.2–4.8)
LYMPHOCYTES NFR BLD AUTO: 14.6 %
MCH RBC QN AUTO: 31.6 PG (ref 26–34)
MCHC RBC AUTO-ENTMCNC: 33.4 G/DL (ref 32–36)
MCV RBC AUTO: 95 FL (ref 80–100)
MONOCYTES # BLD AUTO: 0.81 X10*3/UL (ref 0.1–1)
MONOCYTES NFR BLD AUTO: 10.6 %
NEUTROPHILS # BLD AUTO: 5.63 X10*3/UL (ref 1.2–7.7)
NEUTROPHILS NFR BLD AUTO: 73.5 %
NITRITE UR QL STRIP.AUTO: NEGATIVE
NRBC BLD-RTO: 0 /100 WBCS (ref 0–0)
PH UR STRIP.AUTO: 6 [PH]
PLATELET # BLD AUTO: 267 X10*3/UL (ref 150–450)
POTASSIUM SERPL-SCNC: 3.7 MMOL/L (ref 3.5–5.3)
PROT SERPL-MCNC: 6.8 G/DL (ref 6.4–8.2)
PROT UR STRIP.AUTO-MCNC: NEGATIVE MG/DL
RBC # BLD AUTO: 4.27 X10*6/UL (ref 4.5–5.9)
RBC # UR STRIP.AUTO: NEGATIVE /UL
SODIUM SERPL-SCNC: 138 MMOL/L (ref 136–145)
SP GR UR STRIP.AUTO: 1.01
UROBILINOGEN UR STRIP.AUTO-MCNC: NORMAL MG/DL
WBC # BLD AUTO: 7.7 X10*3/UL (ref 4.4–11.3)

## 2024-08-12 PROCEDURE — 83690 ASSAY OF LIPASE: CPT

## 2024-08-12 PROCEDURE — 74177 CT ABD & PELVIS W/CONTRAST: CPT

## 2024-08-12 PROCEDURE — 36415 COLL VENOUS BLD VENIPUNCTURE: CPT

## 2024-08-12 PROCEDURE — 2550000001 HC RX 255 CONTRASTS: Performed by: EMERGENCY MEDICINE

## 2024-08-12 PROCEDURE — 80053 COMPREHEN METABOLIC PANEL: CPT

## 2024-08-12 PROCEDURE — 85025 COMPLETE CBC W/AUTO DIFF WBC: CPT

## 2024-08-12 PROCEDURE — 99284 EMERGENCY DEPT VISIT MOD MDM: CPT | Mod: 25

## 2024-08-12 PROCEDURE — 83605 ASSAY OF LACTIC ACID: CPT

## 2024-08-12 PROCEDURE — 81003 URINALYSIS AUTO W/O SCOPE: CPT

## 2024-08-12 RX ORDER — PHENOBARBITAL, HYOSCYAMINE SULFATE, ATROPINE SULFATE AND SCOPOLAMINE HYDROBROMIDE .0194; .1037; 16.2; .0065 MG/1; MG/1; MG/1; MG/1
1 TABLET ORAL 4 TIMES DAILY
Qty: 20 TABLET | Refills: 0 | Status: SHIPPED | OUTPATIENT
Start: 2024-08-12 | End: 2024-08-17

## 2024-08-12 RX ORDER — DICYCLOMINE HYDROCHLORIDE 20 MG/1
20 TABLET ORAL 3 TIMES DAILY
Qty: 15 TABLET | Refills: 0 | Status: SHIPPED | OUTPATIENT
Start: 2024-08-12 | End: 2024-08-17

## 2024-08-12 ASSESSMENT — COLUMBIA-SUICIDE SEVERITY RATING SCALE - C-SSRS
2. HAVE YOU ACTUALLY HAD ANY THOUGHTS OF KILLING YOURSELF?: NO
1. IN THE PAST MONTH, HAVE YOU WISHED YOU WERE DEAD OR WISHED YOU COULD GO TO SLEEP AND NOT WAKE UP?: NO

## 2024-08-12 ASSESSMENT — PAIN - FUNCTIONAL ASSESSMENT: PAIN_FUNCTIONAL_ASSESSMENT: 0-10

## 2024-08-12 NOTE — ED TRIAGE NOTES
Pt to the ED from home for RUQ pain, pt has hx of diverticulosis and had large part of bowel removed, pt denies n/v, pt has lost about 20 pounds in 3 week period due to not being able to eat

## 2024-08-12 NOTE — ED PROVIDER NOTES
HPI   Chief Complaint   Patient presents with    Abdominal Pain       HPI  HISTORY OF PRESENT ILLNESS:  58 y.o. male presenting to the ED with complaint of right-sided abdominal pain for the past 2 weeks.  He reports relatively constant aching, cramping pain in the right upper quadrant that radiates into the right lower quadrant.  He states the pain is worse after eating.  He states that he has not been eating much due to the pain, and he states that he has lost about 15 pounds in the past 3 weeks due to this.  Pain does not radiate into the back or flanks, does not radiate into the chest.  He reports nausea, but no vomiting.  Denies constipation or diarrhea, no melena or hematochezia.  Denies fever chills.  Denies urinary symptoms, no dysuria, hematuria, urinary urgency or frequency.  States that he was constipated about a week ago, he states his GI doctor ordered an x-ray which also showed constipation, took some over-the-counter medications and is no longer constipated.  Does a history of diverticulitis, previously had a bowel resection in 2021 due to diverticulitis.  No other medications taken for symptoms.  No other complaints or symptoms voiced.    12 point review of systems was performed and is negative unless otherwise specified in HPI.    PMH: GERD, BPH, diverticulitis s/p bowel resection 2021, GERD, HTN, HLD, IBS, DJD, discoid lupus on hydroxychloroquine  Family history: noncontributory  Social history: Former smoker, no ETOH, no illicit substances    Abnormal Labs Reviewed   CBC WITH AUTO DIFFERENTIAL - Abnormal; Notable for the following components:       Result Value    RBC 4.27 (*)     Hematocrit 40.4 (*)     Lymphocytes Absolute 1.12 (*)     All other components within normal limits     Patient History   Past Medical History:   Diagnosis Date    BPH (benign prostatic hyperplasia)     Cervical disc disease     Discoid lupus erythematosus     Diverticulitis     s/p bowel resection 2021    Dry eyes      GERD (gastroesophageal reflux disease)     History of sepsis      - left knee    Hyperlipidemia     Hypertension     Insomnia     Irritable bowel syndrome     Lichen planus     Lumbar stenosis     Osteoarthritis     Spinal stenosis     Superficial phlebitis of arm     Tinnitus     Trigger finger of right thumb     Trigger finger of thumb     Urachal cyst     Vitamin D deficiency      Past Surgical History:   Procedure Laterality Date    ABSCESS DRAINAGE      abdominal    ANTERIOR CRUCIATE LIGAMENT REPAIR  2015    Primary Repair Of Knee Ligament Cruciate Anterior    BOWEL RESECTION      COLONOSCOPY  2022    Complete Colonoscopy    ELBOW FRACTURE SURGERY Left     KNEE ARTHROSCOPY W/ DEBRIDEMENT  2015    Knee Arthroscopy (Therapeutic)    KNEE ARTHROSCOPY W/ HARDWARE REMOVAL Left     2015    LUMBAR FUSION  2015    Lumbar Vertebral Fusion    LUMBAR LAMINECTOMY  2015    Laminectomy Lumbar    OTHER SURGICAL HISTORY  2020    Epidural steroid injection    OTHER SURGICAL HISTORY  2018    Arthroscopy Shoulder Right x 2    SHOULDER ARTHROSCOPY Left     x 3    SPINAL FUSION  2023    UPPER GASTROINTESTINAL ENDOSCOPY       Family History   Problem Relation Name Age of Onset    Breast cancer Mother      Hypertension Father      Transient ischemic attack Father      Irritable bowel syndrome Sister       Social History     Tobacco Use    Smoking status: Former     Current packs/day: 0.00     Types: Cigarettes     Quit date:      Years since quittin.6     Passive exposure: Never    Smokeless tobacco: Never    Tobacco comments:     Pt denies any illness in past 30 days.      Denies SOB with stairs or activity.     Denies need for assist devices for ambulation.     Denies any personal or family reactions to anesthesia.   Vaping Use    Vaping status: Every Day    Substances: Nicotine   Substance Use Topics    Alcohol use: Yes     Alcohol/week: 10.0 standard drinks of alcohol      Types: 10 Cans of beer per week     Comment: MODERATELY    Drug use: Never       Physical Exam   ED Triage Vitals   Temperature Heart Rate Respirations BP   08/12/24 1121 08/12/24 1121 08/12/24 1121 08/12/24 1121   36.7 °C (98.1 °F) 93 14 133/75      Pulse Ox Temp Source Heart Rate Source Patient Position   08/12/24 1121 08/12/24 1121 08/12/24 1203 08/12/24 1203   98 % Temporal Monitor Sitting      BP Location FiO2 (%)     08/12/24 1203 --     Left arm        Physical Exam  Constitutional:       General: He is not in acute distress.     Appearance: He is not ill-appearing, toxic-appearing or diaphoretic.   HENT:      Head: Normocephalic and atraumatic.      Nose: No congestion.      Mouth/Throat:      Mouth: Mucous membranes are moist.   Eyes:      General: No scleral icterus.     Extraocular Movements: Extraocular movements intact.      Pupils: Pupils are equal, round, and reactive to light.   Cardiovascular:      Rate and Rhythm: Normal rate and regular rhythm.      Pulses: Normal pulses.   Pulmonary:      Effort: Pulmonary effort is normal. No respiratory distress.   Abdominal:      General: There is no distension.      Palpations: Abdomen is soft. There is no mass or pulsatile mass.      Tenderness: There is abdominal tenderness in the right upper quadrant, right lower quadrant and periumbilical area. There is no right CVA tenderness, left CVA tenderness, guarding or rebound.   Musculoskeletal:         General: Normal range of motion.      Cervical back: Normal range of motion and neck supple. No rigidity.      Right lower leg: No edema.      Left lower leg: No edema.   Skin:     General: Skin is warm and dry.      Capillary Refill: Capillary refill takes less than 2 seconds.   Neurological:      General: No focal deficit present.      Mental Status: He is alert and oriented to person, place, and time.      Cranial Nerves: No cranial nerve deficit.      Gait: Gait normal.   Psychiatric:         Mood and Affect:  Mood normal.         Behavior: Behavior normal.         Judgment: Judgment normal.     ED Course & MDM   Diagnoses as of 08/12/24 1906   Abdominal pain, unspecified abdominal location     Medical Decision Making  ED course / MDM     Summary:  Patient presented with right-sided abdominal pain for the past 3 weeks, worsened after meals, some nausea, has lost about 15 pounds due to decreased appetite from pain.  Vital signs stable, patient is overall very well-appearing.  Ambulates unassisted, no acute distress.  On exam, there is some tenderness across the right side of the abdomen, no distention, no guarding or rigidity, no peritoneal signs.  No CVA tenderness.  IV established, labs drawn.  Labs reassuring and largely normal, notably no leukocytosis, normal hemoglobin, no electrode abnormalities, normal kidney and liver function.  UA noninfected.  Normal lactate and lipase.  CT scan shows no acute process, fluid density within the right iliopsoas near the hip consistent with iliopsoas bursitis.  Patient declined any pain medications in the ED, stating his pain is overall mild.  Patient case discussed with ED attending Dr. Butler, who also saw and evaluated the patient, and also performed the final evaluation and provided the patient with discharge instructions, including education, prescriptions, follow up instructions, and return precautions.     Impression:  1. See diagnosis    Disposition: Discharge    Patient seen and discussed with Dr. Butler    This note has been transcribed using voice recognition and may contain grammatical errors, misplaced words, incorrect words, incorrect phrases or other errors.   Procedure  Procedures     Homa Anaya PA-C  08/12/24 1908

## 2024-08-13 LAB — HOLD SPECIMEN: NORMAL

## 2024-08-14 ENCOUNTER — OFFICE VISIT (OUTPATIENT)
Dept: ORTHOPEDIC SURGERY | Facility: CLINIC | Age: 59
End: 2024-08-14
Payer: COMMERCIAL

## 2024-08-14 DIAGNOSIS — M25.512 LEFT SHOULDER PAIN, UNSPECIFIED CHRONICITY: ICD-10-CM

## 2024-08-14 DIAGNOSIS — M75.122 COMPLETE TEAR OF LEFT ROTATOR CUFF, UNSPECIFIED WHETHER TRAUMATIC: Primary | ICD-10-CM

## 2024-08-14 PROCEDURE — 99214 OFFICE O/P EST MOD 30 MIN: CPT | Performed by: STUDENT IN AN ORGANIZED HEALTH CARE EDUCATION/TRAINING PROGRAM

## 2024-08-14 NOTE — PROGRESS NOTES
CHIEF COMPLAINT: No chief complaint on file.    History: 58 y.o. male presents to the office today for evaluation of his left shoulder.  The patient is right-hand dominant.  He works a desk job.  He is active and likes to go to swim, but does not do heavy lifting.  He has a very extensive history regarding his left shoulder.  He has undergone 4 surgeries on the left shoulder in the past.  He had a large rotator cuff tear that has attempted to be fixed on multiple occasions.  His most recent surgery was about a year ago for revision rotator cuff repair.  Unfortunately, that has failed as well.  He is here today to discuss surgical options.  Says his main issue is pain, but he also has some weakness.  States that the pain is on daily basis and it especially is painful at night.  Denies numbness or tingling.  Pain is really deep and anterior lateral in the shoulder.    Past medical history, past surgical history, medications, allergies, family history, social history, and review of systems were reviewed today.    A 12 point review of systems was negative other than as stated in the HPI.    Past Medical History:   Diagnosis Date    BPH (benign prostatic hyperplasia)     Cervical disc disease     Discoid lupus erythematosus     Diverticulitis     s/p bowel resection 2021    Dry eyes     GERD (gastroesophageal reflux disease)     History of sepsis     2020 - left knee    Hyperlipidemia     Hypertension     Insomnia     Irritable bowel syndrome     Lichen planus     Lumbar stenosis     Osteoarthritis     Spinal stenosis     Superficial phlebitis of arm     Tinnitus     Trigger finger of right thumb     Trigger finger of thumb     Urachal cyst     Vitamin D deficiency         Allergies   Allergen Reactions    Vancomycin Nausea/vomiting, Anxiety and Tinnitus     Other reaction(s): Intolerance   Tinnitus and shaking        Past Surgical History:   Procedure Laterality Date    ABSCESS DRAINAGE  2022    abdominal    ANTERIOR  CRUCIATE LIGAMENT REPAIR  2015    Primary Repair Of Knee Ligament Cruciate Anterior    BOWEL RESECTION      COLONOSCOPY  2022    Complete Colonoscopy    ELBOW FRACTURE SURGERY Left     KNEE ARTHROSCOPY W/ DEBRIDEMENT  2015    Knee Arthroscopy (Therapeutic)    KNEE ARTHROSCOPY W/ HARDWARE REMOVAL Left     2015    LUMBAR FUSION  2015    Lumbar Vertebral Fusion    LUMBAR LAMINECTOMY  2015    Laminectomy Lumbar    OTHER SURGICAL HISTORY  2020    Epidural steroid injection    OTHER SURGICAL HISTORY  2018    Arthroscopy Shoulder Right x 2    SHOULDER ARTHROSCOPY Left     x 3    SPINAL FUSION  2023    UPPER GASTROINTESTINAL ENDOSCOPY          Family History   Problem Relation Name Age of Onset    Breast cancer Mother      Hypertension Father      Transient ischemic attack Father      Irritable bowel syndrome Sister          Social History     Socioeconomic History    Marital status:      Spouse name: Not on file    Number of children: Not on file    Years of education: Not on file    Highest education level: Not on file   Occupational History    Not on file   Tobacco Use    Smoking status: Former     Current packs/day: 0.00     Types: Cigarettes     Quit date:      Years since quittin.6     Passive exposure: Never    Smokeless tobacco: Never    Tobacco comments:     Pt denies any illness in past 30 days.      Denies SOB with stairs or activity.     Denies need for assist devices for ambulation.     Denies any personal or family reactions to anesthesia.   Vaping Use    Vaping status: Every Day    Substances: Nicotine   Substance and Sexual Activity    Alcohol use: Yes     Alcohol/week: 10.0 standard drinks of alcohol     Types: 10 Cans of beer per week     Comment: MODERATELY    Drug use: Never    Sexual activity: Defer   Other Topics Concern    Not on file   Social History Narrative    Not on file     Social Determinants of Health     Financial Resource Strain:  Low Risk  (12/29/2023)    Overall Financial Resource Strain (CARDIA)     Difficulty of Paying Living Expenses: Not hard at all   Food Insecurity: Not on file   Transportation Needs: No Transportation Needs (12/29/2023)    PRAPARE - Transportation     Lack of Transportation (Medical): No     Lack of Transportation (Non-Medical): No   Physical Activity: Not on file   Stress: Not on file   Social Connections: Not on file   Intimate Partner Violence: Not on file   Housing Stability: Low Risk  (12/29/2023)    Housing Stability Vital Sign     Unable to Pay for Housing in the Last Year: No     Number of Places Lived in the Last Year: 1     Unstable Housing in the Last Year: No        CURRENT MEDICATIONS:   Current Outpatient Medications   Medication Sig Dispense Refill    azelastine (Astelin) 137 mcg (0.1 %) nasal spray Administer 1 spray into each nostril in the morning and 1 spray before bedtime. Use in each nostril as directed. 30 mL 2    ciclopirox 1 % shampoo Wet hair and apply shampoo to scalp. Lather and leave on for 3 minutes. Rinse. Do this twice a week at night. 120 mL 11    cyclobenzaprine (Flexeril) 10 mg tablet Take 1 tablet (10 mg) by mouth 3 times a day as needed for muscle spasms.      dicyclomine (Bentyl) 20 mg tablet Take 1 tablet (20 mg) by mouth 3 times a day for 5 days. As needed for abdominal pain 15 tablet 0    fluticasone (Flonase Sensimist) 27.5 mcg/actuation nasal spray Administer 1 spray into each nostril once daily as needed for rhinitis.      gentamicin (Garamycin) 0.3 % ophthalmic solution Apply topically underneath affected nail plate(s) twice daily. 15 mL 2    HYDROcodone-acetaminophen (Norco) 5-325 mg tablet Take 1 tablet by mouth every 6 hours if needed for severe pain (7 - 10). 12 tablet 0    hydroxychloroquine (PlaqueniL) 200 mg tablet Take 1 tablet (200 mg) by mouth once daily. 30 tablet 2    ketoconazole (NIZOral) 2 % shampoo Apply 1 Application topically 2 times a week.       "lidocaine-diphenhydrAMINE-Maalox 1:1:1 Magic Mouthwash Swish and swallow 30 mL 3 times a day as needed (severe bdominal pain). 300 mL 2    lisinopril 10 mg tablet Take 1 tablet (10 mg) by mouth once daily. 90 tablet 3    pantoprazole (ProtoNix) 40 mg EC tablet       PHENobarbital-hyoscyamine-atropine-scopoloamine (DonnataL) 16.2-0.1037 -0.0194 mg tablet Take 1 tablet by mouth 4 times a day for 5 days. As needed for abdominal pain 20 tablet 0    rosuvastatin (Crestor) 10 mg tablet Take 1 tablet (10 mg) by mouth once daily. 90 tablet 2    tacrolimus (Protopic) 0.1 % ointment in the morning and at bedtime. APPLY AND GENTLY MASSAGE INTO AFFECTED AREA(S)      zolpidem (Ambien) 10 mg tablet Take 1 tablet (10 mg) by mouth as needed at bedtime for sleep. 90 tablet 1     No current facility-administered medications for this visit.       Physical Examination:      4/29/2024     3:41 PM 7/3/2024     8:09 AM 7/17/2024     3:39 PM 8/12/2024    11:21 AM 8/12/2024    12:03 PM 8/12/2024     1:03 PM 8/12/2024     3:52 PM   Vitals   Systolic    133 134 122 145   Diastolic    75 80 80 80   Heart Rate    93 89 82 79   Temp    36.7 °C (98.1 °F) 37.1 °C (98.8 °F)     Resp    14 16 18 16   Height (in) 1.803 m (5' 11\") 1.803 m (5' 11\") 1.803 m (5' 11\") 1.803 m (5' 11\")      Weight (lb) 188 188 188 176      BMI 26.22 kg/m2 26.22 kg/m2 26.22 kg/m2 24.55 kg/m2      BSA (m2) 2.07 m2 2.07 m2 2.07 m2 2 m2      Visit Report Report Report Report          There is no height or weight on file to calculate BMI.    Well-appearing, appears stated age, pleasant and cooperative, appropriate mood and behavior. Height and weight reviewed. Alert and oriented x3.  Auditory function intact.  No acute distress.  Intact ocular function, PETE, EOMI. Breathing is unlabored .  There is no evidence of jugular venous distension. Skin appearance is normal without evidence of rash or other lesions. 2+ radial pulses bilaterally, fingers pink and wwp, good capillary " refill, no pitting edema. No appreciable lymphadenopathy in bilateral upper extremities. SILT throughout both upper extremities, median/radial/ulnar/musculocutaneous/axillary nerve motor and sensory intact (except for abnormalities noted in focused musculoskeletal exam section below).     Neck exam: Full range of motion of the neck in flexion/extension and rotational movements. No significant areas of tenderness to palpation in the neck.    On exam of bilateral upper extremities, he has remarkable range of motion of both shoulders today.  Active forward flexion to 180, external Tatian 90, internal rotation T12.  Weakness with strength testing on the left side, 4+ out of 5 supraspinatus testing, 4 out of 5 external rotation testing.  Pain with Neer and Mojica maneuvers of the left shoulder.    Imaging: Radiographs and MRI from an outside institution reviewed today.  Personally interpreted by myself.  There is evidence of prior anchors from a attempted rotator cuff repair.  There is massive retearing of the tendon with retraction.  There is mild loss of glenohumeral joint space.    Assessment: Failed, recurrent rotator cuff repairs    Plan: I had an extensive discussion with the patient with treatment options diagnosis.  This is a very complicated situation.  The patient has had multiple attempts of rotator cuff repair on the left side.  Unfortunately, these have all failed.  He now has a massive, retear of the tendon with multiple anchors in the proximal humerus.  Remarkably, he has excellent range of motion today, but his main issue is pain as well as higher level strength.  I told him that truthfully, I think there are only a few options that are realistic.  The first option is to live with the shoulder as it is and consider injections.  Unfortunately, he had better response to injections in the past.  The second option would be to consider a arthroscopic debridement with possible dermal allograft interposition.   This potentially could help with his pain, but likely would not help with his strength.  The last option would be to proceed with a reverse shoulder replacement.  Although he is quite young, this is potentially the most reliable option.  He would lose a bit of range of motion particular with forward elevation and internal rotation, but his pain and strength would be improved.  We talked at length about these options.  He has a hip replacement scheduled in about 5 weeks and we will get that done first.  Will have him come back in about 2 months to discuss further and potentially discuss doing surgery for the end of the year.  All questions were answered today.    Total patient encounter took >30 minutes, including review of the medical record and all previous tests and notes related to this condition, any outside notes, recent or new imaging, as well as the patient interview, documentation in the EMR, placement of orders or any requested physician documentation and coordination of care.       Dragon software was used to dictate this note, please be aware that minor errors in transcription may be present.    Jared Leavitt MD    Shoulder/Elbow Surgery  [facility]

## 2024-08-20 ENCOUNTER — APPOINTMENT (OUTPATIENT)
Dept: ORTHOPEDIC SURGERY | Facility: CLINIC | Age: 59
End: 2024-08-20
Payer: COMMERCIAL

## 2024-08-20 DIAGNOSIS — L21.9 SEBORRHEIC DERMATITIS: ICD-10-CM

## 2024-08-20 PROBLEM — R63.4 WEIGHT LOSS: Status: ACTIVE | Noted: 2023-10-23

## 2024-08-20 RX ORDER — KETOCONAZOLE 20 MG/ML
SHAMPOO, SUSPENSION TOPICAL 3 TIMES WEEKLY
Qty: 120 ML | Refills: 11 | Status: SHIPPED | OUTPATIENT
Start: 2024-08-21

## 2024-08-21 ENCOUNTER — APPOINTMENT (OUTPATIENT)
Dept: ORTHOPEDIC SURGERY | Facility: CLINIC | Age: 59
End: 2024-08-21
Payer: COMMERCIAL

## 2024-08-23 ENCOUNTER — APPOINTMENT (OUTPATIENT)
Dept: DERMATOLOGY | Facility: CLINIC | Age: 59
End: 2024-08-23
Payer: COMMERCIAL

## 2024-08-23 DIAGNOSIS — A49.8 PSEUDOMONAS INFECTION: ICD-10-CM

## 2024-08-23 DIAGNOSIS — L93.0 DISCOID LUPUS ERYTHEMATOSUS: ICD-10-CM

## 2024-08-23 DIAGNOSIS — L21.9 SEBORRHEIC DERMATITIS: ICD-10-CM

## 2024-08-23 PROCEDURE — 99213 OFFICE O/P EST LOW 20 MIN: CPT

## 2024-08-23 PROCEDURE — 11900 INJECT SKIN LESIONS </W 7: CPT

## 2024-08-23 RX ORDER — HYDROXYCHLOROQUINE SULFATE 200 MG/1
200 TABLET, FILM COATED ORAL DAILY
Qty: 90 TABLET | Refills: 1 | Status: SHIPPED | OUTPATIENT
Start: 2024-08-23

## 2024-08-23 RX ORDER — LIDOCAINE HYDROCHLORIDE 10 MG/ML
0.5 INJECTION INFILTRATION; PERINEURAL ONCE
Status: COMPLETED | OUTPATIENT
Start: 2024-08-23 | End: 2024-08-23

## 2024-08-23 NOTE — PROGRESS NOTES
Subjective   HPI: Sharath Weiss is a 59 y.o. male who presents in office for evaluation and treatment of Pseudomonas infection of right toenail as well as discoid lupus erythematosus of scalp and seborrheic dermatitis.  Patient would like intralesional Kenalog injections of his scalp.  He is still taking the Plaquenil.  Refilled Plaquenil.    ROS: No other skin or systemic complaints other than what is documented elsewhere in the note.    ALLERGIES: Vancomycin    SOCIAL:  reports that he quit smoking about 6 years ago. His smoking use included cigarettes. He has never been exposed to tobacco smoke. He has never used smokeless tobacco. He reports current alcohol use of about 10.0 standard drinks of alcohol per week. He reports that he does not use drugs.    Objective   Numerous patches of hair loss that appear less inflamed than last time. There appears to be some hair growth and previous injected areas     Scalp  No acute skin findings today    Right Hallux Toenail  Significant improvement since last visit.  The bluish-green tent has moved to primarily the right upper outer corner of the toenail and has grown out significantly.        Assessment/Plan   Discoid lupus erythematosus    Patient has restarted taking the Plaquenil 200 mg tablet p.o. daily.  He also would like ILK injections.    Related Medications  hydroxychloroquine (PlaqueniL) 200 mg tablet  Take 1 tablet (200 mg) by mouth once daily.    triamcinolone acetonide (Kenalog) injection 10 mg      lidocaine (Xylocaine) 10 mg/mL (1 %) injection 0.5 mL      Seborrheic dermatitis  Scalp    Continue ciclopirox shampoo    Related Medications  ciclopirox 1 % shampoo  Wet hair and apply shampoo to scalp. Lather and leave on for 3 minutes. Rinse. Do this twice a week at night.    ketoconazole (NIZOral) 2 % shampoo  Apply topically 3 (three) times a week.    Pseudomonas infection  Right Hallux Toenail    Clinically consistent with Pseudomonas toenail infection.   Continue using topical.         FOLLOW UP: As needed    The patient was encouraged to contact me with any further questions or concerns.  Hannah Holman PA-C  8/28/2024

## 2024-08-26 ENCOUNTER — APPOINTMENT (OUTPATIENT)
Dept: ORTHOPEDIC SURGERY | Facility: HOSPITAL | Age: 59
End: 2024-08-26
Payer: COMMERCIAL

## 2024-09-05 ENCOUNTER — APPOINTMENT (OUTPATIENT)
Dept: PREADMISSION TESTING | Facility: HOSPITAL | Age: 59
End: 2024-09-05
Payer: COMMERCIAL

## 2024-09-06 ENCOUNTER — APPOINTMENT (OUTPATIENT)
Dept: PREADMISSION TESTING | Facility: HOSPITAL | Age: 59
End: 2024-09-06
Payer: COMMERCIAL

## 2024-09-09 ENCOUNTER — LAB (OUTPATIENT)
Dept: LAB | Facility: LAB | Age: 59
End: 2024-09-09
Payer: COMMERCIAL

## 2024-09-27 DIAGNOSIS — F51.01 PRIMARY INSOMNIA: ICD-10-CM

## 2024-09-28 RX ORDER — ZOLPIDEM TARTRATE 10 MG/1
10 TABLET ORAL NIGHTLY PRN
Qty: 30 TABLET | Refills: 0 | Status: SHIPPED | OUTPATIENT
Start: 2024-09-28

## 2024-10-06 RX ORDER — TRAMADOL HYDROCHLORIDE 50 MG/1
TABLET ORAL
COMMUNITY
Start: 2024-09-20 | End: 2024-10-08 | Stop reason: ALTCHOICE

## 2024-10-06 RX ORDER — DOXYCYCLINE 100 MG/1
100 CAPSULE ORAL 2 TIMES DAILY
COMMUNITY
Start: 2024-09-20 | End: 2024-10-08 | Stop reason: ALTCHOICE

## 2024-10-06 RX ORDER — BLACK COHOSH ROOT 200 MG
CAPSULE ORAL
COMMUNITY
Start: 2024-09-20 | End: 2024-10-08 | Stop reason: ALTCHOICE

## 2024-10-06 RX ORDER — CIPROFLOXACIN HYDROCHLORIDE 3 MG/ML
1 SOLUTION/ DROPS OPHTHALMIC 2 TIMES DAILY
COMMUNITY
Start: 2024-05-22 | End: 2024-10-08 | Stop reason: ALTCHOICE

## 2024-10-06 RX ORDER — TERBINAFINE HYDROCHLORIDE 250 MG/1
1 TABLET ORAL DAILY
COMMUNITY
Start: 2024-08-03

## 2024-10-06 RX ORDER — ASPIRIN 81 MG/1
81 TABLET ORAL 2 TIMES DAILY
COMMUNITY
Start: 2024-09-21 | End: 2024-10-19

## 2024-10-06 RX ORDER — LIDOCAINE HYDROCHLORIDE 20 MG/ML
SOLUTION ORAL; TOPICAL
COMMUNITY
Start: 2024-06-20 | End: 2024-10-08 | Stop reason: ALTCHOICE

## 2024-10-06 RX ORDER — MELOXICAM 15 MG/1
15 TABLET ORAL DAILY
COMMUNITY
Start: 2024-09-20 | End: 2024-10-08 | Stop reason: WASHOUT

## 2024-10-06 RX ORDER — ACETAMINOPHEN 500 MG
TABLET ORAL
COMMUNITY
Start: 2024-09-20

## 2024-10-08 ENCOUNTER — APPOINTMENT (OUTPATIENT)
Dept: PRIMARY CARE | Facility: CLINIC | Age: 59
End: 2024-10-08
Payer: COMMERCIAL

## 2024-10-08 VITALS
BODY MASS INDEX: 25.76 KG/M2 | WEIGHT: 184 LBS | DIASTOLIC BLOOD PRESSURE: 82 MMHG | SYSTOLIC BLOOD PRESSURE: 132 MMHG | HEART RATE: 96 BPM | HEIGHT: 71 IN | OXYGEN SATURATION: 100 %

## 2024-10-08 DIAGNOSIS — N40.0 BENIGN PROSTATIC HYPERPLASIA, UNSPECIFIED WHETHER LOWER URINARY TRACT SYMPTOMS PRESENT: ICD-10-CM

## 2024-10-08 DIAGNOSIS — K29.50 CHRONIC GASTRITIS WITHOUT BLEEDING, UNSPECIFIED GASTRITIS TYPE: ICD-10-CM

## 2024-10-08 DIAGNOSIS — M54.50 CHRONIC RIGHT-SIDED LOW BACK PAIN WITHOUT SCIATICA: ICD-10-CM

## 2024-10-08 DIAGNOSIS — R39.198 SLOWING OF URINARY STREAM: ICD-10-CM

## 2024-10-08 DIAGNOSIS — K21.9 GASTROESOPHAGEAL REFLUX DISEASE WITHOUT ESOPHAGITIS: ICD-10-CM

## 2024-10-08 DIAGNOSIS — E78.5 HYPERLIPIDEMIA, UNSPECIFIED HYPERLIPIDEMIA TYPE: ICD-10-CM

## 2024-10-08 DIAGNOSIS — F51.01 PRIMARY INSOMNIA: ICD-10-CM

## 2024-10-08 DIAGNOSIS — I10 HYPERTENSION, ESSENTIAL: ICD-10-CM

## 2024-10-08 DIAGNOSIS — E55.9 VITAMIN D DEFICIENCY: ICD-10-CM

## 2024-10-08 DIAGNOSIS — G89.29 CHRONIC RIGHT-SIDED LOW BACK PAIN WITHOUT SCIATICA: ICD-10-CM

## 2024-10-08 DIAGNOSIS — E29.1 HYPOGONADISM MALE: ICD-10-CM

## 2024-10-08 DIAGNOSIS — Z00.00 ENCOUNTER FOR HEALTH MAINTENANCE EXAMINATION: Primary | ICD-10-CM

## 2024-10-08 LAB
25(OH)D3 SERPL-MCNC: 29 NG/ML (ref 30–100)
ALBUMIN SERPL BCP-MCNC: 4.6 G/DL (ref 3.4–5)
ALP SERPL-CCNC: 57 U/L (ref 33–120)
ALT SERPL W P-5'-P-CCNC: 12 U/L (ref 10–52)
ANION GAP SERPL CALC-SCNC: 11 MMOL/L (ref 10–20)
APPEARANCE UR: CLEAR
AST SERPL W P-5'-P-CCNC: 14 U/L (ref 9–39)
BILIRUB SERPL-MCNC: 0.4 MG/DL (ref 0–1.2)
BILIRUB UR STRIP.AUTO-MCNC: NEGATIVE MG/DL
BUN SERPL-MCNC: 15 MG/DL (ref 6–23)
CALCIUM SERPL-MCNC: 9.5 MG/DL (ref 8.6–10.3)
CHLORIDE SERPL-SCNC: 105 MMOL/L (ref 98–107)
CHOLEST SERPL-MCNC: 162 MG/DL (ref 0–199)
CHOLESTEROL/HDL RATIO: 2.4
CO2 SERPL-SCNC: 29 MMOL/L (ref 21–32)
COLOR UR: NORMAL
CREAT SERPL-MCNC: 0.88 MG/DL (ref 0.5–1.3)
EGFRCR SERPLBLD CKD-EPI 2021: >90 ML/MIN/1.73M*2
ERYTHROCYTE [DISTWIDTH] IN BLOOD BY AUTOMATED COUNT: 11.7 % (ref 11.5–14.5)
GLUCOSE SERPL-MCNC: 95 MG/DL (ref 74–99)
GLUCOSE UR STRIP.AUTO-MCNC: NORMAL MG/DL
HCT VFR BLD AUTO: 40.2 % (ref 41–52)
HDLC SERPL-MCNC: 66.4 MG/DL
HGB BLD-MCNC: 12.5 G/DL (ref 13.5–17.5)
KETONES UR STRIP.AUTO-MCNC: NEGATIVE MG/DL
LDLC SERPL CALC-MCNC: 72 MG/DL
LEUKOCYTE ESTERASE UR QL STRIP.AUTO: NEGATIVE
MCH RBC QN AUTO: 31.3 PG (ref 26–34)
MCHC RBC AUTO-ENTMCNC: 31.1 G/DL (ref 32–36)
MCV RBC AUTO: 101 FL (ref 80–100)
NITRITE UR QL STRIP.AUTO: NEGATIVE
NON HDL CHOLESTEROL: 96 MG/DL (ref 0–149)
NRBC BLD-RTO: 0 /100 WBCS (ref 0–0)
PH UR STRIP.AUTO: 6 [PH]
PLATELET # BLD AUTO: 379 X10*3/UL (ref 150–450)
POTASSIUM SERPL-SCNC: 4.4 MMOL/L (ref 3.5–5.3)
PROT SERPL-MCNC: 6.3 G/DL (ref 6.4–8.2)
PROT UR STRIP.AUTO-MCNC: NEGATIVE MG/DL
PSA SERPL-MCNC: 0.84 NG/ML
RBC # BLD AUTO: 3.99 X10*6/UL (ref 4.5–5.9)
RBC # UR STRIP.AUTO: NEGATIVE /UL
SODIUM SERPL-SCNC: 141 MMOL/L (ref 136–145)
SP GR UR STRIP.AUTO: 1.02
TRIGL SERPL-MCNC: 118 MG/DL (ref 0–149)
TSH SERPL-ACNC: 0.65 MIU/L (ref 0.44–3.98)
UROBILINOGEN UR STRIP.AUTO-MCNC: NORMAL MG/DL
VLDL: 24 MG/DL (ref 0–40)
WBC # BLD AUTO: 6.8 X10*3/UL (ref 4.4–11.3)

## 2024-10-08 PROCEDURE — 84153 ASSAY OF PSA TOTAL: CPT

## 2024-10-08 PROCEDURE — 85027 COMPLETE CBC AUTOMATED: CPT

## 2024-10-08 PROCEDURE — 80053 COMPREHEN METABOLIC PANEL: CPT

## 2024-10-08 PROCEDURE — 81003 URINALYSIS AUTO W/O SCOPE: CPT

## 2024-10-08 PROCEDURE — 99396 PREV VISIT EST AGE 40-64: CPT | Performed by: FAMILY MEDICINE

## 2024-10-08 PROCEDURE — 84443 ASSAY THYROID STIM HORMONE: CPT

## 2024-10-08 PROCEDURE — 3079F DIAST BP 80-89 MM HG: CPT | Performed by: FAMILY MEDICINE

## 2024-10-08 PROCEDURE — 3008F BODY MASS INDEX DOCD: CPT | Performed by: FAMILY MEDICINE

## 2024-10-08 PROCEDURE — 1036F TOBACCO NON-USER: CPT | Performed by: FAMILY MEDICINE

## 2024-10-08 PROCEDURE — 84403 ASSAY OF TOTAL TESTOSTERONE: CPT

## 2024-10-08 PROCEDURE — 82306 VITAMIN D 25 HYDROXY: CPT

## 2024-10-08 PROCEDURE — 80061 LIPID PANEL: CPT

## 2024-10-08 PROCEDURE — 3075F SYST BP GE 130 - 139MM HG: CPT | Performed by: FAMILY MEDICINE

## 2024-10-08 RX ORDER — CYCLOBENZAPRINE HCL 10 MG
10 TABLET ORAL 3 TIMES DAILY PRN
Qty: 90 TABLET | Refills: 2 | Status: SHIPPED | OUTPATIENT
Start: 2024-10-08

## 2024-10-08 RX ORDER — ZOLPIDEM TARTRATE 10 MG/1
10 TABLET ORAL NIGHTLY PRN
Qty: 30 TABLET | Refills: 5 | Status: SHIPPED | OUTPATIENT
Start: 2024-10-26 | End: 2025-04-24

## 2024-10-08 RX ORDER — PANTOPRAZOLE SODIUM 40 MG/1
40 TABLET, DELAYED RELEASE ORAL
Qty: 90 TABLET | Refills: 2 | Status: SHIPPED | OUTPATIENT
Start: 2024-10-08

## 2024-10-08 RX ORDER — LISINOPRIL 10 MG/1
10 TABLET ORAL DAILY
Qty: 90 TABLET | Refills: 2 | Status: SHIPPED | OUTPATIENT
Start: 2024-10-08 | End: 2025-10-03

## 2024-10-08 RX ORDER — ROSUVASTATIN CALCIUM 10 MG/1
10 TABLET, COATED ORAL DAILY
Qty: 90 TABLET | Refills: 2 | Status: SHIPPED | OUTPATIENT
Start: 2024-10-08

## 2024-10-08 ASSESSMENT — PROMIS GLOBAL HEALTH SCALE
RATE_GENERAL_HEALTH: GOOD
RATE_MENTAL_HEALTH: VERY GOOD
CARRYOUT_SOCIAL_ACTIVITIES: GOOD
EMOTIONAL_PROBLEMS: NEVER
RATE_QUALITY_OF_LIFE: VERY GOOD
RATE_SOCIAL_SATISFACTION: GOOD
RATE_PHYSICAL_HEALTH: FAIR
RATE_AVERAGE_PAIN: 2
RATE_AVERAGE_FATIGUE: MILD
CARRYOUT_PHYSICAL_ACTIVITIES: MODERATELY

## 2024-10-08 NOTE — PROGRESS NOTES
Annual Comprehensive Medical Exam    59 y.o. male presents for annual comprehensive medical evaluation and preventive services screening.  No recent hospitalizations, surgeries or significant injuries.    HPI    Lumbar spine fusion in 1/2024    Right posterior MIGUEL last month at [facility].  On crutches in office today.  Able to drive car now    Insomnia.  Ambien every night.  Last refill 9/28.    No longer using narcotic pain medication or NSAIDs.  Continues to use Protonix for acid reflux.    Chronic intermittent abdominal pain is treated with Magic mouthwash    Podiatrist currently using ophthalmic antibiotic to treat wound on his foot.      Colon cancer screening with colonoscopy in November 2022          Past Medical History:   Diagnosis Date    BPH (benign prostatic hyperplasia)     Cervical disc disease     Discoid lupus erythematosus     Diverticulitis     s/p bowel resection 2021    Dry eyes     GERD (gastroesophageal reflux disease)     History of sepsis     2020 - left knee    Hyperlipidemia     Hypertension     Insomnia     Irritable bowel syndrome     Lichen planus     Lumbar stenosis     Osteoarthritis     Spinal stenosis     Superficial phlebitis of arm     Tinnitus     Trigger finger of right thumb     Trigger finger of thumb     Urachal cyst     Vitamin D deficiency       Past Surgical History:   Procedure Laterality Date    ABSCESS DRAINAGE  2022    abdominal    ANTERIOR CRUCIATE LIGAMENT REPAIR  04/24/2015    Primary Repair Of Knee Ligament Cruciate Anterior    BOWEL RESECTION      COLONOSCOPY  11/02/2022    Complete Colonoscopy    ELBOW FRACTURE SURGERY Left     KNEE ARTHROSCOPY W/ DEBRIDEMENT  04/24/2015    Knee Arthroscopy (Therapeutic)    KNEE ARTHROSCOPY W/ HARDWARE REMOVAL Left     2015    LUMBAR FUSION  04/24/2015    Lumbar Vertebral Fusion    LUMBAR LAMINECTOMY  04/24/2015    Laminectomy Lumbar    OTHER SURGICAL HISTORY  07/13/2020    Epidural steroid injection    OTHER SURGICAL HISTORY   2018    Arthroscopy Shoulder Right x 2    SHOULDER ARTHROSCOPY Left     x 3    SPINAL FUSION  2023    UPPER GASTROINTESTINAL ENDOSCOPY       Family History   Problem Relation Name Age of Onset    Breast cancer Mother      Hypertension Father      Transient ischemic attack Father      Irritable bowel syndrome Sister        Social History     Socioeconomic History    Marital status:      Spouse name: Not on file    Number of children: Not on file    Years of education: Not on file    Highest education level: Not on file   Occupational History    Not on file   Tobacco Use    Smoking status: Former     Current packs/day: 0.00     Types: Cigarettes     Quit date:      Years since quittin.7     Passive exposure: Never    Smokeless tobacco: Never    Tobacco comments:     Pt denies any illness in past 30 days.      Denies SOB with stairs or activity.     Denies need for assist devices for ambulation.     Denies any personal or family reactions to anesthesia.   Vaping Use    Vaping status: Every Day    Substances: Nicotine   Substance and Sexual Activity    Alcohol use: Yes     Alcohol/week: 10.0 standard drinks of alcohol     Types: 10 Cans of beer per week     Comment: MODERATELY    Drug use: Never    Sexual activity: Defer   Other Topics Concern    Not on file   Social History Narrative    Not on file     Social Determinants of Health     Financial Resource Strain: Low Risk  (2023)    Overall Financial Resource Strain (CARDIA)     Difficulty of Paying Living Expenses: Not hard at all   Food Insecurity: Not on file   Transportation Needs: No Transportation Needs (2023)    PRAPARE - Transportation     Lack of Transportation (Medical): No     Lack of Transportation (Non-Medical): No   Physical Activity: Not on file   Stress: Not on file   Social Connections: Not on file   Intimate Partner Violence: Not on file   Housing Stability: Low Risk  (2023)    Housing Stability Vital Sign      Unable to Pay for Housing in the Last Year: No     Number of Places Lived in the Last Year: 1     Unstable Housing in the Last Year: No       Current Outpatient Medications on File Prior to Visit   Medication Sig Dispense Refill    acetaminophen (Tylenol) 500 mg tablet       aspirin 81 mg EC tablet Take 1 tablet (81 mg) by mouth twice a day.      cyclobenzaprine (Flexeril) 10 mg tablet Take 1 tablet (10 mg) by mouth 3 times a day as needed for muscle spasms.      fluticasone (Flonase Sensimist) 27.5 mcg/actuation nasal spray Administer 1 spray into each nostril once daily as needed for rhinitis.      gentamicin (Garamycin) 0.3 % ophthalmic solution Apply topically underneath affected nail plate(s) twice daily. 15 mL 2    hydroxychloroquine (PlaqueniL) 200 mg tablet Take 1 tablet (200 mg) by mouth once daily. 90 tablet 1    Lidocaine Viscous 2 % solution       lidocaine-diphenhydrAMINE-Maalox 1:1:1 Magic Mouthwash Swish and swallow 30 mL 3 times a day as needed (severe bdominal pain). 300 mL 2    lisinopril 10 mg tablet Take 1 tablet (10 mg) by mouth once daily. 90 tablet 3    meloxicam (Mobic) 15 mg tablet Take 1 tablet (15 mg) by mouth once daily.      pantoprazole (ProtoNix) 40 mg EC tablet       rosuvastatin (Crestor) 10 mg tablet Take 1 tablet (10 mg) by mouth once daily. 90 tablet 2    tacrolimus (Protopic) 0.1 % ointment in the morning and at bedtime. APPLY AND GENTLY MASSAGE INTO AFFECTED AREA(S)      traMADol (Ultram) 50 mg tablet       Vitamin C 500 mg tablet       zolpidem (Ambien) 10 mg tablet TAKE ONE TABLET BY MOUTH AT BEDTIME AS NEEDED FOR SLEEP 30 tablet 0    azelastine (Astelin) 137 mcg (0.1 %) nasal spray Administer 1 spray into each nostril in the morning and 1 spray before bedtime. Use in each nostril as directed. 30 mL 2    ciclopirox 1 % shampoo Wet hair and apply shampoo to scalp. Lather and leave on for 3 minutes. Rinse. Do this twice a week at night. (Patient not taking: Reported on  "10/8/2024) 120 mL 11    ciprofloxacin (Ciloxan) 0.3 % ophthalmic solution 1 drop twice a day.      doxycycline (Vibramycin) 100 mg capsule Take 1 capsule (100 mg) by mouth 2 times a day.      HYDROcodone-acetaminophen (Norco) 5-325 mg tablet Take 1 tablet by mouth every 6 hours if needed for severe pain (7 - 10). (Patient not taking: Reported on 10/8/2024) 12 tablet 0    ketoconazole (NIZOral) 2 % shampoo Apply 1 Application topically 2 times a week.      ketoconazole (NIZOral) 2 % shampoo Apply topically 3 (three) times a week. (Patient not taking: Reported on 10/8/2024) 120 mL 11    PHENobarbital-hyoscyamine-atropine-scopoloamine (DonnataL) 16.2-0.1037 -0.0194 mg tablet Take 1 tablet by mouth 4 times a day for 5 days. As needed for abdominal pain 20 tablet 0    terbinafine (LamISIL) 250 mg tablet Take 1 tablet (250 mg) by mouth once daily.       No current facility-administered medications on file prior to visit.       Allergies   Allergen Reactions    Vancomycin Nausea/vomiting, Anxiety and Tinnitus     Other reaction(s): Intolerance   Tinnitus and shaking         Review of Systems:  Complete review of systems is negative today except for that mentioned in the history of present illness.  In particular patient denies chest pain, shortness of breath, headaches and GI disturbances.      Visit Vitals  /90   Pulse 96   Ht 1.803 m (5' 11\")   Wt 83.5 kg (184 lb)   SpO2 100%   BMI 25.66 kg/m²   Smoking Status Former   BSA 2.04 m²      Physical Exam  Gen: Alert and oriented ×3 male in no acute distress.  HEENT: Head is normocephalic.  Extraocular muscles are intact.  Tympanic membranes are clear.  Pharynx is clear.  Neck is supple without adenopathy or carotid bruits.  No masses or thyromegaly  Heart: Regular rate and rhythm without murmurs.  Lungs: Clear to auscultation bilaterally.  Abdomen: Soft with normal bowel sounds.  No masses or pain to palpation.  No bruits auscultated.  Extremities: Fair range of motion " of lower extremities.  Full range of motion of upper extremities..  No significant edema. Pedal pulses +1-2/4.  Able to get onto and off of exam table with minimal assistance.  Walking with crutches.  Neuro: No signs of focal neurologic deficit.  No tremor.  Speech and hearing are normal.  DTRs +2/4;  Muscle Strength +5/5.  Musculoskeletal: Spine with mildly limited ROM.  No scoliosis.   Skin: No significant or irregular nevi visualized.  Psych: normal affect.  No suicidal ideation.  Good judgement and insight.       DIAGNOSIS/PLAN  1. Encounter for health maintenance examination  -Continue follow-up with orthopedic surgeon and physical therapist for recent total right hip arthroplasty.  - Continue lower calorie diet while recovering from surgery.  Ensure at least 90 g of dietary protein per day.    2. Gastroesophageal reflux disease without esophagitis  - pantoprazole (ProtoNix) 40 mg EC tablet; Take 1 tablet (40 mg) by mouth once daily in the morning. Take before meals. Do not crush, chew, or split.  Dispense: 90 tablet; Refill: 2    3. Chronic right-sided low back pain without sciatica  - cyclobenzaprine (Flexeril) 10 mg tablet; Take 1 tablet (10 mg) by mouth 3 times a day as needed for muscle spasms.  Dispense: 90 tablet; Refill: 2    4. Primary insomnia  - zolpidem (Ambien) 10 mg tablet; Take 1 tablet (10 mg) by mouth as needed at bedtime for sleep. Do not fill before October 26, 2024.  Dispense: 30 tablet; Refill: 5    5. Hyperlipidemia, unspecified hyperlipidemia type  - Lipid Panel  - rosuvastatin (Crestor) 10 mg tablet; Take 1 tablet (10 mg) by mouth once daily.  Dispense: 90 tablet; Refill: 2    6. Hypertension, essential  - Comprehensive Metabolic Panel  - lisinopril 10 mg tablet; Take 1 tablet (10 mg) by mouth once daily.  Dispense: 90 tablet; Refill: 2    7. Chronic gastritis without bleeding, unspecified gastritis type  - diphenhydramine/Maalox/lidocaine (Magic Mouthwash) - Compounded - Outpatient; 60  ml TID swish and swallow prn stomatitis and gastritis  Dispense: 480 mL; Refill: 2    8. Vitamin D deficiency  - CBC  - Vitamin D 25-Hydroxy,Total (for eval of Vitamin D levels)    9. Hypogonadism male  - TSH with reflex to Free T4 if abnormal  - Testosterone    10. Slowing of urinary stream  - Prostate Specific Antigen  - Urinalysis with Reflex Microscopic    11. Benign prostatic hyperplasia, unspecified whether lower urinary tract symptoms present  - Prostate Specific Antigen  - Urinalysis with Reflex Microscopic            Buster Barney DO, FACOFP

## 2024-10-09 LAB — TESTOST SERPL-MCNC: 345 NG/DL (ref 240–1000)

## 2024-10-22 ENCOUNTER — APPOINTMENT (OUTPATIENT)
Dept: ORTHOPEDIC SURGERY | Facility: CLINIC | Age: 59
End: 2024-10-22
Payer: COMMERCIAL

## 2024-10-30 ENCOUNTER — APPOINTMENT (OUTPATIENT)
Dept: ORTHOPEDIC SURGERY | Facility: CLINIC | Age: 59
End: 2024-10-30
Payer: COMMERCIAL

## 2024-11-05 ENCOUNTER — OFFICE (OUTPATIENT)
Dept: URBAN - METROPOLITAN AREA CLINIC 26 | Facility: CLINIC | Age: 59
End: 2024-11-05
Payer: COMMERCIAL

## 2024-11-05 VITALS
SYSTOLIC BLOOD PRESSURE: 124 MMHG | DIASTOLIC BLOOD PRESSURE: 74 MMHG | HEIGHT: 71 IN | HEART RATE: 82 BPM | WEIGHT: 182 LBS | TEMPERATURE: 98.5 F

## 2024-11-05 DIAGNOSIS — Z79.1 LONG TERM (CURRENT) USE OF NON-STEROIDAL ANTI-INFLAMMATORIES: ICD-10-CM

## 2024-11-05 DIAGNOSIS — D64.9 ANEMIA, UNSPECIFIED: ICD-10-CM

## 2024-11-05 DIAGNOSIS — K59.09 OTHER CONSTIPATION: ICD-10-CM

## 2024-11-05 DIAGNOSIS — K21.9 GASTRO-ESOPHAGEAL REFLUX DISEASE WITHOUT ESOPHAGITIS: ICD-10-CM

## 2024-11-05 PROCEDURE — 99214 OFFICE O/P EST MOD 30 MIN: CPT | Performed by: NURSE PRACTITIONER

## 2024-11-05 RX ORDER — PANTOPRAZOLE 20 MG/1
TABLET, DELAYED RELEASE ORAL
Qty: 90 | Refills: 1 | Status: ACTIVE

## 2024-11-19 ENCOUNTER — APPOINTMENT (OUTPATIENT)
Dept: ORTHOPEDIC SURGERY | Facility: CLINIC | Age: 59
End: 2024-11-19
Payer: COMMERCIAL

## 2024-12-04 VITALS
OXYGEN SATURATION: 97 % | OXYGEN SATURATION: 98 % | DIASTOLIC BLOOD PRESSURE: 45 MMHG | WEIGHT: 179 LBS | OXYGEN SATURATION: 96 % | OXYGEN SATURATION: 96 % | HEART RATE: 69 BPM | RESPIRATION RATE: 13 BRPM | RESPIRATION RATE: 7 BRPM | RESPIRATION RATE: 10 BRPM | DIASTOLIC BLOOD PRESSURE: 69 MMHG | DIASTOLIC BLOOD PRESSURE: 43 MMHG | DIASTOLIC BLOOD PRESSURE: 48 MMHG | RESPIRATION RATE: 10 BRPM | DIASTOLIC BLOOD PRESSURE: 67 MMHG | HEART RATE: 75 BPM | OXYGEN SATURATION: 100 % | RESPIRATION RATE: 12 BRPM | DIASTOLIC BLOOD PRESSURE: 69 MMHG | HEART RATE: 72 BPM | SYSTOLIC BLOOD PRESSURE: 92 MMHG | HEART RATE: 72 BPM | RESPIRATION RATE: 7 BRPM | HEART RATE: 78 BPM | RESPIRATION RATE: 13 BRPM | SYSTOLIC BLOOD PRESSURE: 108 MMHG | HEART RATE: 86 BPM | SYSTOLIC BLOOD PRESSURE: 108 MMHG | SYSTOLIC BLOOD PRESSURE: 117 MMHG | SYSTOLIC BLOOD PRESSURE: 117 MMHG | RESPIRATION RATE: 13 BRPM | DIASTOLIC BLOOD PRESSURE: 48 MMHG | DIASTOLIC BLOOD PRESSURE: 42 MMHG | DIASTOLIC BLOOD PRESSURE: 45 MMHG | DIASTOLIC BLOOD PRESSURE: 42 MMHG | HEART RATE: 67 BPM | HEART RATE: 87 BPM | OXYGEN SATURATION: 95 % | DIASTOLIC BLOOD PRESSURE: 43 MMHG | HEART RATE: 69 BPM | SYSTOLIC BLOOD PRESSURE: 136 MMHG | RESPIRATION RATE: 8 BRPM | OXYGEN SATURATION: 100 % | OXYGEN SATURATION: 95 % | SYSTOLIC BLOOD PRESSURE: 112 MMHG | HEART RATE: 65 BPM | RESPIRATION RATE: 12 BRPM | SYSTOLIC BLOOD PRESSURE: 102 MMHG | OXYGEN SATURATION: 97 % | SYSTOLIC BLOOD PRESSURE: 102 MMHG | SYSTOLIC BLOOD PRESSURE: 87 MMHG | SYSTOLIC BLOOD PRESSURE: 87 MMHG | HEART RATE: 86 BPM | HEART RATE: 87 BPM | DIASTOLIC BLOOD PRESSURE: 48 MMHG | HEIGHT: 71 IN | HEART RATE: 70 BPM | DIASTOLIC BLOOD PRESSURE: 69 MMHG | HEART RATE: 87 BPM | HEART RATE: 67 BPM | RESPIRATION RATE: 16 BRPM | SYSTOLIC BLOOD PRESSURE: 92 MMHG | SYSTOLIC BLOOD PRESSURE: 90 MMHG | DIASTOLIC BLOOD PRESSURE: 56 MMHG | RESPIRATION RATE: 16 BRPM | OXYGEN SATURATION: 96 % | RESPIRATION RATE: 12 BRPM | OXYGEN SATURATION: 95 % | HEART RATE: 78 BPM | OXYGEN SATURATION: 98 % | HEIGHT: 71 IN | TEMPERATURE: 98 F | HEART RATE: 65 BPM | DIASTOLIC BLOOD PRESSURE: 56 MMHG | SYSTOLIC BLOOD PRESSURE: 112 MMHG | WEIGHT: 179 LBS | HEART RATE: 75 BPM | HEART RATE: 67 BPM | HEART RATE: 70 BPM | OXYGEN SATURATION: 99 % | OXYGEN SATURATION: 97 % | RESPIRATION RATE: 16 BRPM | WEIGHT: 179 LBS | RESPIRATION RATE: 8 BRPM | SYSTOLIC BLOOD PRESSURE: 90 MMHG | RESPIRATION RATE: 8 BRPM | SYSTOLIC BLOOD PRESSURE: 136 MMHG | DIASTOLIC BLOOD PRESSURE: 42 MMHG | HEART RATE: 65 BPM | TEMPERATURE: 98 F | DIASTOLIC BLOOD PRESSURE: 67 MMHG | SYSTOLIC BLOOD PRESSURE: 112 MMHG | HEART RATE: 78 BPM | HEART RATE: 70 BPM | OXYGEN SATURATION: 98 % | SYSTOLIC BLOOD PRESSURE: 87 MMHG | SYSTOLIC BLOOD PRESSURE: 102 MMHG | DIASTOLIC BLOOD PRESSURE: 67 MMHG | RESPIRATION RATE: 7 BRPM | HEART RATE: 86 BPM | SYSTOLIC BLOOD PRESSURE: 92 MMHG | SYSTOLIC BLOOD PRESSURE: 136 MMHG | TEMPERATURE: 98 F | OXYGEN SATURATION: 99 % | OXYGEN SATURATION: 100 % | HEART RATE: 69 BPM | HEIGHT: 71 IN | DIASTOLIC BLOOD PRESSURE: 45 MMHG | OXYGEN SATURATION: 99 % | SYSTOLIC BLOOD PRESSURE: 108 MMHG | RESPIRATION RATE: 10 BRPM | DIASTOLIC BLOOD PRESSURE: 43 MMHG | DIASTOLIC BLOOD PRESSURE: 56 MMHG | SYSTOLIC BLOOD PRESSURE: 117 MMHG | HEART RATE: 72 BPM | HEART RATE: 75 BPM | SYSTOLIC BLOOD PRESSURE: 90 MMHG

## 2024-12-11 ENCOUNTER — APPOINTMENT (OUTPATIENT)
Dept: ORTHOPEDIC SURGERY | Facility: CLINIC | Age: 59
End: 2024-12-11
Payer: COMMERCIAL

## 2024-12-11 ENCOUNTER — AMBULATORY SURGICAL CENTER (OUTPATIENT)
Dept: URBAN - METROPOLITAN AREA SURGERY 12 | Facility: SURGERY | Age: 59
End: 2024-12-11
Payer: COMMERCIAL

## 2024-12-11 ENCOUNTER — OFFICE (OUTPATIENT)
Dept: URBAN - METROPOLITAN AREA PATHOLOGY 2 | Facility: PATHOLOGY | Age: 59
End: 2024-12-11
Payer: COMMERCIAL

## 2024-12-11 DIAGNOSIS — K22.70 BARRETT'S ESOPHAGUS WITHOUT DYSPLASIA: ICD-10-CM

## 2024-12-11 DIAGNOSIS — K31.A12 GASTRIC INTESTINAL METAPLASIA WITHOUT DYSPLASIA, INVOLVING T: ICD-10-CM

## 2024-12-11 DIAGNOSIS — R10.13 EPIGASTRIC PAIN: ICD-10-CM

## 2024-12-11 DIAGNOSIS — K31.89 OTHER DISEASES OF STOMACH AND DUODENUM: ICD-10-CM

## 2024-12-11 DIAGNOSIS — R11.0 NAUSEA: ICD-10-CM

## 2024-12-11 DIAGNOSIS — K22.89 OTHER SPECIFIED DISEASE OF ESOPHAGUS: ICD-10-CM

## 2024-12-11 PROBLEM — K29.70 GASTRITIS, UNSPECIFIED, WITHOUT BLEEDING: Status: ACTIVE | Noted: 2024-12-11

## 2024-12-11 PROCEDURE — 88305 TISSUE EXAM BY PATHOLOGIST: CPT | Performed by: PATHOLOGY

## 2024-12-11 PROCEDURE — 88342 IMHCHEM/IMCYTCHM 1ST ANTB: CPT | Performed by: PATHOLOGY

## 2024-12-11 PROCEDURE — 88313 SPECIAL STAINS GROUP 2: CPT | Performed by: PATHOLOGY

## 2024-12-11 PROCEDURE — 43239 EGD BIOPSY SINGLE/MULTIPLE: CPT | Performed by: INTERNAL MEDICINE

## 2024-12-11 RX ORDER — SUCRALFATE 1 G/1
1 TABLET ORAL
Qty: 90 | Refills: 1 | Status: ACTIVE
Start: 2024-12-11

## 2024-12-17 ENCOUNTER — APPOINTMENT (OUTPATIENT)
Dept: ORTHOPEDIC SURGERY | Facility: CLINIC | Age: 59
End: 2024-12-17
Payer: COMMERCIAL

## 2024-12-18 ENCOUNTER — OFFICE VISIT (OUTPATIENT)
Dept: ORTHOPEDIC SURGERY | Facility: HOSPITAL | Age: 59
End: 2024-12-18
Payer: COMMERCIAL

## 2024-12-18 DIAGNOSIS — M75.122 COMPLETE TEAR OF LEFT ROTATOR CUFF, UNSPECIFIED WHETHER TRAUMATIC: ICD-10-CM

## 2024-12-18 PROCEDURE — 99214 OFFICE O/P EST MOD 30 MIN: CPT | Performed by: ORTHOPAEDIC SURGERY

## 2024-12-18 NOTE — PROGRESS NOTES
Reevaluation of his left shoulder he has had several opinions about what to do with shoulder after a failed massive rotator cuff tear surgery.  He has had a total of 5 surgeries on his shoulder all arthroscopic.  He has cuff deficiency with atrophy and early cephalad shift of the humerus.  He gets episodes every few days of pain and flareups.  On exam the patient has well-healed arthroscopy scars he actually has fairly good range of motion of the shoulder can elevate to 170 motor power in abduction is 4/5 external rotation 4/5 internal rotation 5/5  strength 5/5 radial pulse palpable brisk cap refill light touch sensation intact.  Slight atrophy of the deltoid.    MRI scan confirms massive rotator cuff tear retracted to the glenoid with suture anchors present from previous surgery    We did detailed discussion about the treatment options for the massive cuff deficiency.  These include attempted repair, tendon transfers, interposition grafting, and reverse arthroplasty.      We did discuss her reverse arthroplasty including potential benefits possible risks alternatives and rehabilitation sequence.  The patient is interested in pursuing this and would like to schedule sometime in the new year.  The procedure would be     left shoulder reverse arthroplasty with biceps tenodesis    This was dictated using voice recognition software and not corrected for grammatical or spelling errors.

## 2025-02-12 ENCOUNTER — OFFICE VISIT (OUTPATIENT)
Dept: ORTHOPEDIC SURGERY | Facility: CLINIC | Age: 60
End: 2025-02-12
Payer: COMMERCIAL

## 2025-02-12 VITALS — WEIGHT: 184 LBS | BODY MASS INDEX: 25.76 KG/M2 | HEIGHT: 71 IN

## 2025-02-12 DIAGNOSIS — M54.12 CERVICAL RADICULITIS: Primary | ICD-10-CM

## 2025-02-12 PROCEDURE — 99213 OFFICE O/P EST LOW 20 MIN: CPT | Performed by: ORTHOPAEDIC SURGERY

## 2025-02-12 PROCEDURE — 3008F BODY MASS INDEX DOCD: CPT | Performed by: ORTHOPAEDIC SURGERY

## 2025-02-12 RX ORDER — PREDNISONE 20 MG/1
20 TABLET ORAL DAILY
Qty: 7 TABLET | Refills: 1 | Status: SHIPPED | OUTPATIENT
Start: 2025-02-12

## 2025-02-12 NOTE — PROGRESS NOTES
Patient returns with a new problem.  He is doing well from his lower back surgery, also had a hip replacement in September of last year.  For the last 5 weeks he has had increasing cervical radicular pain, burning and tingling in his right arm and hand.  He has had episodes like this in the past, they typically do not last this long.  He denies any bowel or bladder incontinence or other symptoms of myelopathy.    On exam he is well-appearing and in no distress.  He does not have any focal neurologic deficits.    I personally reviewed MRI of the cervical spine that he brought with him on disc.  This was done back in 2020.  It shows varying degrees of degenerative change without spinal cord compression.    51-year-old male with cervical radiculitis.  He does not have any evidence of spinal cord compression.  I recommended short course of prednisone as well as referral to physical therapy.    If he does not have relief of his symptoms with these measures he should contact my office to schedule an MRI of the cervical spine and a follow-up appointment.    *This note was dictated using speech recognition software and was not corrected for spelling or grammatical errors*

## 2025-03-31 ENCOUNTER — APPOINTMENT (OUTPATIENT)
Dept: ORTHOPEDIC SURGERY | Facility: CLINIC | Age: 60
End: 2025-03-31
Payer: COMMERCIAL

## 2025-04-02 ENCOUNTER — OFFICE VISIT (OUTPATIENT)
Dept: ORTHOPEDIC SURGERY | Facility: CLINIC | Age: 60
End: 2025-04-02
Payer: COMMERCIAL

## 2025-04-02 DIAGNOSIS — M48.02 CERVICAL SPINAL STENOSIS: Primary | ICD-10-CM

## 2025-04-02 PROCEDURE — 99213 OFFICE O/P EST LOW 20 MIN: CPT | Performed by: ORTHOPAEDIC SURGERY

## 2025-04-02 NOTE — PROGRESS NOTES
"Patient returns to review his cervical MRI with me.  Since doing physical therapy he has had about 50% improvement in his right arm radicular symptoms.  He has neck pain that he describes as a \"deep bruise.\".    He is scheduled to have an epidural injection next week.    I reviewed the MRI personally.  This shows bilateral foraminal stenosis from C4-7.  There is no spinal cord compression.    Given the fact that he has only foraminal narrowing at multiple levels and no cord compression I encouraged him to pursue the epidural injection.  I think there is a high likelihood it will continue to improve his radicular pain.  I explained that surgery, while could be helpful for improving the pain and numbness in his arms, will not really do a whole lot for the neck pain and if he does not have radicular symptoms in his arms at all I would not recommend any cervical spine surgery.    I am happy to see him back on an as needed basis.    *This note was dictated using speech recognition software and was not corrected for spelling or grammatical errors*  "

## 2025-04-07 ENCOUNTER — OFFICE (OUTPATIENT)
Dept: URBAN - METROPOLITAN AREA CLINIC 26 | Facility: CLINIC | Age: 60
End: 2025-04-07
Payer: COMMERCIAL

## 2025-04-07 VITALS — HEIGHT: 71 IN | TEMPERATURE: 98 F | WEIGHT: 181 LBS

## 2025-04-07 DIAGNOSIS — K59.09 OTHER CONSTIPATION: ICD-10-CM

## 2025-04-07 DIAGNOSIS — K21.9 GASTRO-ESOPHAGEAL REFLUX DISEASE WITHOUT ESOPHAGITIS: ICD-10-CM

## 2025-04-07 DIAGNOSIS — R14.0 ABDOMINAL DISTENSION (GASEOUS): ICD-10-CM

## 2025-04-07 PROCEDURE — 99213 OFFICE O/P EST LOW 20 MIN: CPT | Performed by: NURSE PRACTITIONER

## 2025-04-07 RX ORDER — PANTOPRAZOLE 20 MG/1
TABLET, DELAYED RELEASE ORAL
Qty: 90 | Refills: 1 | Status: ACTIVE

## 2025-04-07 RX ORDER — SUCRALFATE 1 G/1
2 TABLET ORAL
Qty: 180 | Refills: 1 | Status: ACTIVE
Start: 2024-12-11

## 2025-04-07 RX ORDER — MELOXICAM 15 MG/1
TABLET ORAL
Qty: 14 | Refills: 0 | Status: COMPLETED
End: 2025-04-07

## 2025-04-14 ENCOUNTER — APPOINTMENT (OUTPATIENT)
Dept: PRIMARY CARE | Facility: CLINIC | Age: 60
End: 2025-04-14
Payer: COMMERCIAL

## 2025-04-14 VITALS
DIASTOLIC BLOOD PRESSURE: 76 MMHG | HEIGHT: 71 IN | SYSTOLIC BLOOD PRESSURE: 135 MMHG | WEIGHT: 181 LBS | BODY MASS INDEX: 25.34 KG/M2

## 2025-04-14 DIAGNOSIS — I10 HYPERTENSION, ESSENTIAL: ICD-10-CM

## 2025-04-14 DIAGNOSIS — Z51.81 ENCOUNTER FOR THERAPEUTIC DRUG LEVEL MONITORING: ICD-10-CM

## 2025-04-14 DIAGNOSIS — M81.0 OSTEOPOROSIS WITHOUT CURRENT PATHOLOGICAL FRACTURE, UNSPECIFIED OSTEOPOROSIS TYPE: Primary | ICD-10-CM

## 2025-04-14 DIAGNOSIS — M32.10 SYSTEMIC LUPUS ERYTHEMATOSUS, ORGAN OR SYSTEM INVOLVEMENT UNSPECIFIED (MULTI): ICD-10-CM

## 2025-04-14 DIAGNOSIS — Z02.83 ENCOUNTER FOR DRUG SCREENING: ICD-10-CM

## 2025-04-14 DIAGNOSIS — F51.01 PRIMARY INSOMNIA: ICD-10-CM

## 2025-04-14 PROCEDURE — 1036F TOBACCO NON-USER: CPT | Performed by: FAMILY MEDICINE

## 2025-04-14 PROCEDURE — 3075F SYST BP GE 130 - 139MM HG: CPT | Performed by: FAMILY MEDICINE

## 2025-04-14 PROCEDURE — 3078F DIAST BP <80 MM HG: CPT | Performed by: FAMILY MEDICINE

## 2025-04-14 PROCEDURE — 99214 OFFICE O/P EST MOD 30 MIN: CPT | Performed by: FAMILY MEDICINE

## 2025-04-14 PROCEDURE — 3008F BODY MASS INDEX DOCD: CPT | Performed by: FAMILY MEDICINE

## 2025-04-14 RX ORDER — ALENDRONATE SODIUM 70 MG/1
70 TABLET ORAL
Qty: 12 TABLET | Refills: 2 | Status: SHIPPED | OUTPATIENT
Start: 2025-04-14 | End: 2025-12-22

## 2025-04-14 RX ORDER — MELOXICAM 15 MG/1
1 TABLET ORAL
COMMUNITY
Start: 2025-03-20

## 2025-04-14 RX ORDER — SUCRALFATE 1 G/1
TABLET ORAL
COMMUNITY
Start: 2025-04-07

## 2025-04-14 RX ORDER — ZOLPIDEM TARTRATE 10 MG/1
10 TABLET ORAL NIGHTLY PRN
Qty: 30 TABLET | Refills: 5 | Status: SHIPPED | OUTPATIENT
Start: 2025-04-25 | End: 2025-10-22

## 2025-04-14 RX ORDER — LISINOPRIL 10 MG/1
10 TABLET ORAL DAILY
Qty: 90 TABLET | Refills: 2 | Status: SHIPPED | OUTPATIENT
Start: 2025-04-14 | End: 2026-04-09

## 2025-04-14 NOTE — PROGRESS NOTES
General Medical Management Note    59 y.o. male presents for Medical Management  HPI    Neck pain with cervical radiculopathy.  Managed by Dr. Nasim Quiros, orthopedic surgery.  Recently had MRI and has been involved with physical therapy.  He has approximately 50% improvement in his right arm radicular symptoms.  His neck pain is described as a deep bruise sensation.  Had epidural injection last week.    Left knee arthritis is being managed by orthopedic surgeon at the [facility].  Planning replacement in near future.  Using Mobic daily with some effectiveness.      Left MIGUEL 9/2024 is improving.    Discoid lupus is managed by dermatologist Yen Caballero at [facility]    Insomnia - uses Ambien nightly.  Last dose last night.  Last refill 3/28/25    Osteoporosis - DEXA 4/2/25.  Unable to do significant weight bearing exercise but swims 5 days/wk.  Takes stairs at work.        Past Medical History:   Diagnosis Date    BPH (benign prostatic hyperplasia)     Cervical disc disease     Discoid lupus erythematosus     Diverticulitis     s/p bowel resection 2021    Dry eyes     GERD (gastroesophageal reflux disease)     History of sepsis     2020 - left knee    Hyperlipidemia     Hypertension     Insomnia     Irritable bowel syndrome     Lichen planus     Lumbar stenosis     Osteoarthritis     Spinal stenosis     Superficial phlebitis of arm     Tinnitus     Trigger finger of right thumb     Trigger finger of thumb     Urachal cyst     Vitamin D deficiency       Past Surgical History:   Procedure Laterality Date    ABSCESS DRAINAGE  2022    abdominal    ANTERIOR CRUCIATE LIGAMENT REPAIR  04/24/2015    Primary Repair Of Knee Ligament Cruciate Anterior    BOWEL RESECTION      ELBOW FRACTURE SURGERY Left     KNEE ARTHROSCOPY W/ DEBRIDEMENT  04/24/2015    Knee Arthroscopy (Therapeutic)    KNEE ARTHROSCOPY W/ HARDWARE REMOVAL Left     2015    LUMBAR FUSION  04/24/2015    Lumbar Vertebral Fusion    LUMBAR LAMINECTOMY   2015    Laminectomy Lumbar    SHOULDER ARTHROSCOPY  2018    Arthroscopy Shoulder Right x 2    SHOULDER ARTHROSCOPY Left     x 3    SPINAL FUSION  2023    TOTAL HIP ARTHROPLASTY Right 2024    UPPER GASTROINTESTINAL ENDOSCOPY       Family History   Problem Relation Name Age of Onset    Breast cancer Mother      Hypertension Father      Transient ischemic attack Father      Irritable bowel syndrome Sister        Social History     Socioeconomic History    Marital status:      Spouse name: Not on file    Number of children: Not on file    Years of education: Not on file    Highest education level: Not on file   Occupational History    Not on file   Tobacco Use    Smoking status: Former     Current packs/day: 0.00     Types: Cigarettes     Quit date:      Years since quittin.2     Passive exposure: Never    Smokeless tobacco: Never    Tobacco comments:     Pt denies any illness in past 30 days.      Denies SOB with stairs or activity.     Denies need for assist devices for ambulation.     Denies any personal or family reactions to anesthesia.   Vaping Use    Vaping status: Every Day    Substances: Nicotine   Substance and Sexual Activity    Alcohol use: Yes     Alcohol/week: 10.0 standard drinks of alcohol     Types: 10 Cans of beer per week     Comment: MODERATELY    Drug use: Never    Sexual activity: Defer   Other Topics Concern    Not on file   Social History Narrative    Not on file     Social Drivers of Health     Financial Resource Strain: Low Risk  (2023)    Overall Financial Resource Strain (CARDIA)     Difficulty of Paying Living Expenses: Not hard at all   Food Insecurity: Not on file   Transportation Needs: No Transportation Needs (2023)    PRAPARE - Transportation     Lack of Transportation (Medical): No     Lack of Transportation (Non-Medical): No   Physical Activity: Not on file   Stress: Not on file   Social Connections: Not on file   Intimate Partner  Violence: Not on file   Housing Stability: Low Risk  (12/29/2023)    Housing Stability Vital Sign     Unable to Pay for Housing in the Last Year: No     Number of Places Lived in the Last Year: 1     Unstable Housing in the Last Year: No       Current Outpatient Medications on File Prior to Visit   Medication Sig Dispense Refill    acetaminophen (Tylenol) 500 mg tablet       ciclopirox 1 % shampoo Wet hair and apply shampoo to scalp. Lather and leave on for 3 minutes. Rinse. Do this twice a week at night. 120 mL 11    cyclobenzaprine (Flexeril) 10 mg tablet Take 1 tablet (10 mg) by mouth 3 times a day as needed for muscle spasms. 90 tablet 2    diphenhydramine/Maalox/lidocaine (Magic Mouthwash) - Compounded - Outpatient 60 ml TID swish and swallow prn stomatitis and gastritis 480 mL 2    fluticasone (Flonase Sensimist) 27.5 mcg/actuation nasal spray Administer 1 spray into each nostril once daily as needed for rhinitis.      hydroxychloroquine (PlaqueniL) 200 mg tablet Take 1 tablet (200 mg) by mouth once daily. 90 tablet 1    ketoconazole (NIZOral) 2 % shampoo Apply 1 Application topically 2 times a week.      lisinopril 10 mg tablet Take 1 tablet (10 mg) by mouth once daily. 90 tablet 2    meloxicam (Mobic) 15 mg tablet Take 1 tablet (15 mg) by mouth early in the morning..      pantoprazole (ProtoNix) 40 mg EC tablet Take 1 tablet (40 mg) by mouth once daily in the morning. Take before meals. Do not crush, chew, or split. (Patient taking differently: Take 20 mg by mouth once daily in the morning. Take before meals. Do not crush, chew, or split.) 90 tablet 2    rosuvastatin (Crestor) 10 mg tablet Take 1 tablet (10 mg) by mouth once daily. 90 tablet 2    sucralfate (Carafate) 1 gram tablet       zolpidem (Ambien) 10 mg tablet Take 1 tablet (10 mg) by mouth as needed at bedtime for sleep. Do not fill before October 26, 2024. 30 tablet 5    azelastine (Astelin) 137 mcg (0.1 %) nasal spray Administer 1 spray into each  "nostril in the morning and 1 spray before bedtime. Use in each nostril as directed. (Patient not taking: Reported on 4/14/2025) 30 mL 2    predniSONE (Deltasone) 20 mg tablet Take 1 tablet (20 mg) by mouth once daily. (Patient not taking: Reported on 4/14/2025) 7 tablet 1    terbinafine (LamISIL) 250 mg tablet Take 1 tablet (250 mg) by mouth once daily. (Patient not taking: Reported on 4/14/2025)       No current facility-administered medications on file prior to visit.       Allergies   Allergen Reactions    Vancomycin Nausea/vomiting, Anxiety and Tinnitus     Other reaction(s): Intolerance   Tinnitus and shaking         ROS: Denies chest pain, SOB, Headache, GI problems     Visit Vitals  /76   Ht 1.803 m (5' 11\")   Wt 82.1 kg (181 lb)   BMI 25.24 kg/m²   Smoking Status Former   BSA 2.03 m²      Vitals:    04/14/25 0901   BP: 135/76   Weight: 82.1 kg (181 lb)   Height: 1.803 m (5' 11\")       PHYSICAL EXAM:  Alert and oriented x3.  Eyes: EOM grossly intact  Neck supple without lymph adenopathy or carotid bruit.  No masses or thyromegaly  Heart regular rate and rhythm without murmur.  Lungs clear to auscultation.  Legs without edema.  Gait is non-antalgic  Speech clear.  Hearing adequate.          DIAGNOSIS/PLAN:    1. Osteoporosis without current pathological fracture, unspecified osteoporosis type (Primary)  - Compliance with calcium and vitamin D supplementation recommended  - Initiate Fosamax while awaiting consultation with endocrinology  - Referral to Endocrinology; Future  - alendronate (Fosamax) 70 mg tablet; Take 1 tablet (70 mg) by mouth every 7 days. Take in the morning with a full glass of water, on an empty stomach, and do not take anything else by mouth or lie down for the next 30 min.  Dispense: 12 tablet; Refill: 2    2. Primary insomnia  - zolpidem (Ambien) 10 mg tablet; Take 1 tablet (10 mg) by mouth as needed at bedtime for sleep. Do not fill before April 25, 2025.  Dispense: 30 tablet; " Refill: 5    3. Hypertension, essential  - lisinopril 10 mg tablet; Take 1 tablet (10 mg) by mouth once daily.  Dispense: 90 tablet; Refill: 2    4. Systemic lupus erythematosus, organ or system involvement unspecified (Multi)    5. Encounter for therapeutic drug level monitoring  - Opiate/Opioid/Benzo Prescription Compliance    6. Encounter for drug screening  - Opiate/Opioid/Benzo Prescription Compliance      Return to office in 6 months for comprehensive medical evaluation, long-term medication use monitoring, and preventative services screening    We will continue to monitor, evaluate, assess and treat all problems/diagnoses as appropriate and continue to collaborate with specialists.    Encouraged to sign up with [facility]    Contact office or send a  [employer] message with any questions or concerns    Patient will only be notified of labs that require medical intervention.    Prescriptions will not be filled unless you are compliant with your follow up appointments or have a follow up appointment scheduled as per instruction of your physician. Refills should be requested at the time of your visit.    **Charting was completed using voice recognition technology and may include unintended errors**    Buster Barney DO, RITA  [id number] [facility], [id number]  [place] [address] [phone]  Buster Barney DO, RITA

## 2025-04-14 NOTE — PATIENT INSTRUCTIONS
Osteoporosis:  calcium 1200 mg/day with Vit D 800 units/day (minimum)      Vitamin D ideally supplementing 4,000 units daily

## 2025-04-17 ENCOUNTER — APPOINTMENT (OUTPATIENT)
Dept: OPHTHALMOLOGY | Facility: CLINIC | Age: 60
End: 2025-04-17
Payer: COMMERCIAL

## 2025-04-17 DIAGNOSIS — Z13.5 SCREENING FOR EYE CONDITION: Primary | ICD-10-CM

## 2025-04-17 DIAGNOSIS — Z79.899 ENCOUNTER FOR MONITORING OF HYDROXYCHLOROQUINE THERAPY: ICD-10-CM

## 2025-04-17 DIAGNOSIS — Z51.81 ENCOUNTER FOR MONITORING OF HYDROXYCHLOROQUINE THERAPY: ICD-10-CM

## 2025-04-17 LAB
1OH-MIDAZOLAM UR-MCNC: NEGATIVE NG/ML
7AMINOCLONAZEPAM UR-MCNC: NEGATIVE NG/ML
A-OH ALPRAZ UR-MCNC: NEGATIVE NG/ML
A-OH-TRIAZOLAM UR-MCNC: NEGATIVE NG/ML
AMPHETAMINES UR QL: NEGATIVE NG/ML
BARBITURATES UR QL: NEGATIVE NG/ML
BZE UR QL: NEGATIVE NG/ML
CODEINE UR-MCNC: NEGATIVE NG/ML
CREAT UR-MCNC: 93.9 MG/DL
DRUG SCREEN COMMENT UR-IMP: ABNORMAL
EDDP UR-MCNC: NEGATIVE NG/ML
FENTANYL UR-MCNC: NEGATIVE NG/ML
HYDROCODONE UR-MCNC: NEGATIVE NG/ML
HYDROMORPHONE UR-MCNC: NEGATIVE NG/ML
LORAZEPAM UR-MCNC: NEGATIVE NG/ML
METHADONE UR-MCNC: NEGATIVE NG/ML
MORPHINE UR-MCNC: NEGATIVE NG/ML
NORDIAZEPAM UR-MCNC: NEGATIVE NG/ML
NORFENTANYL UR-MCNC: NEGATIVE NG/ML
NORHYDROCODONE UR CFM-MCNC: NEGATIVE NG/ML
NOROXYCODONE UR CFM-MCNC: NEGATIVE NG/ML
NORTRAMADOL UR-MCNC: NEGATIVE NG/ML
OH-ETHYLFLURAZ UR-MCNC: NEGATIVE NG/ML
OXAZEPAM UR-MCNC: NEGATIVE NG/ML
OXIDANTS UR QL: NEGATIVE MCG/ML
OXYCODONE UR CFM-MCNC: NEGATIVE NG/ML
OXYMORPHONE UR CFM-MCNC: NEGATIVE NG/ML
PCP UR QL: NEGATIVE NG/ML
PH UR: 6.3 [PH] (ref 4.5–9)
QUEST 6 ACETYLMORPHINE: NEGATIVE NG/ML
QUEST NOTES AND COMMENTS: ABNORMAL
QUEST ZOLPIDEM: 28 NG/ML
TEMAZEPAM UR-MCNC: NEGATIVE NG/ML
THC UR QL: NEGATIVE NG/ML
TRAMADOL UR-MCNC: NEGATIVE NG/ML
ZOLPIDEM PHENYL-4-CARB UR CFM-MCNC: >2500 NG/ML

## 2025-04-17 PROCEDURE — 99213 OFFICE O/P EST LOW 20 MIN: CPT | Performed by: OPHTHALMOLOGY

## 2025-04-17 PROCEDURE — 92250 FUNDUS PHOTOGRAPHY W/I&R: CPT | Performed by: OPHTHALMOLOGY

## 2025-04-17 ASSESSMENT — TONOMETRY
OD_IOP_MMHG: 17
OS_IOP_MMHG: 16
IOP_METHOD: GOLDMANN APPLANATION

## 2025-04-17 ASSESSMENT — CONF VISUAL FIELD
OS_SUPERIOR_NASAL_RESTRICTION: 0
OD_INFERIOR_NASAL_RESTRICTION: 0
OD_SUPERIOR_NASAL_RESTRICTION: 0
OS_SUPERIOR_TEMPORAL_RESTRICTION: 0
OD_SUPERIOR_TEMPORAL_RESTRICTION: 0
OS_INFERIOR_NASAL_RESTRICTION: 0
OS_INFERIOR_TEMPORAL_RESTRICTION: 0
OS_NORMAL: 1
OD_NORMAL: 1
OD_INFERIOR_TEMPORAL_RESTRICTION: 0

## 2025-04-17 ASSESSMENT — CUP TO DISC RATIO
OD_RATIO: 0.2
OS_RATIO: 0.15

## 2025-04-17 ASSESSMENT — EXTERNAL EXAM - RIGHT EYE: OD_EXAM: NORMAL

## 2025-04-17 ASSESSMENT — VISUAL ACUITY
OD_CC: 20/20-2
OS_CC: 20/20-2
METHOD: SNELLEN - LINEAR

## 2025-04-17 ASSESSMENT — EXTERNAL EXAM - LEFT EYE: OS_EXAM: NORMAL

## 2025-04-17 ASSESSMENT — SLIT LAMP EXAM - LIDS
COMMENTS: NORMAL
COMMENTS: NORMAL

## 2025-04-17 ASSESSMENT — ENCOUNTER SYMPTOMS: EYES NEGATIVE: 1

## 2025-04-17 NOTE — PROGRESS NOTES
Assessment/Plan   Diagnoses and all orders for this visit:  Screening for eye condition  -     Autofluorescence - OU - Both Eyes  -     OCT, Retina - OU - Both Eyes  Encounter for monitoring of hydroxychloroquine therapy      DIAGNOSTIC PROCEDURE DONE  OCT DONE OD/OS    REASON FOR TEST: will help address and tailor  therapy by detecting subclinical  HCQ toxicity    Hi quality OCT  scans obtained  signal good    OCT OD - Normal Foveal Contour, No Edema, IS/OS Junction Normal  OCT OS - Normal Foveal Contour, No Edema, IS/OS Junction Normal    additional commnents:     Currently on hydroxychloroquine 200mg daily for the past year   - treated for discoid lupus by dermatology [facility]  There is evidence of HCQ use but no toxicity seen imaging or clincilly      Nuclear Cataracts OU  - trace NSC, asymptomatic  - monitor  Get refraction if needed    Fu 1yr

## 2025-04-18 ENCOUNTER — OFFICE VISIT (OUTPATIENT)
Dept: ORTHOPEDIC SURGERY | Facility: CLINIC | Age: 60
End: 2025-04-18
Payer: COMMERCIAL

## 2025-04-18 ENCOUNTER — HOSPITAL ENCOUNTER (OUTPATIENT)
Dept: RADIOLOGY | Facility: CLINIC | Age: 60
Discharge: HOME | End: 2025-04-18
Payer: COMMERCIAL

## 2025-04-18 DIAGNOSIS — M25.562 LEFT KNEE PAIN, UNSPECIFIED CHRONICITY: ICD-10-CM

## 2025-04-18 DIAGNOSIS — M17.32 POST-TRAUMATIC OSTEOARTHRITIS OF LEFT KNEE: Primary | ICD-10-CM

## 2025-04-18 PROCEDURE — 99214 OFFICE O/P EST MOD 30 MIN: CPT | Performed by: STUDENT IN AN ORGANIZED HEALTH CARE EDUCATION/TRAINING PROGRAM

## 2025-04-18 PROCEDURE — 73564 X-RAY EXAM KNEE 4 OR MORE: CPT | Mod: LT

## 2025-04-18 NOTE — PROGRESS NOTES
PRIMARY CARE PHYSICIAN: Buster Barney DO  REFERRING PROVIDER: No referring provider defined for this encounter.       SUBJECTIVE  CHIEF COMPLAINT:   Chief Complaint   Patient presents with    Left Knee - Pain, New Patient Visit        HPI: Sharath Weiss is a pleasant 59 y.o. year-old male who is seen today for evaluation of left knee pain. The pain has been present for several years, worse over the past eight weeks. He has a complicated knee history detailed below:    March 1995 left knee ACL reconstruction auto BTB, PMM, Dr. Strickland  May 1995 ADAM left knee for arthrofibrosis, Dr. Strickland  July 1995 left knee arthroscopy, ADAM, MARGARITO, lateral release, Dr. Cavanaugh  November 1996 left knee diagnostic arthroscopy, [facility] and Dr. Mao Fletcher  November 2015 removal of hardware/tibial screws, Dr. Cavanaugh  2018 left knee native septic arthritis with left knee open incision and drainage, arthrotomy, open synovectomy, incisional wound VAC, Dr. White      Sharath Weiss states that the pain is located in the front and lateral side of the left knee.  Sharath Weiss has a history of septic arthritis. The pain is aggravated by activity and alleviated by rest. The patient denies any numbness or tingling of the left lower extremity.    FUNCTIONAL STATUS: not limited.  AMBULATORY STATUS: Independent community ambulation without devices  PREVIOUS TREATMENTS: physical therapy, over the counter medications, and assistive device   HISTORY OF SURGERY ON AFFECTED KNEE(S): Yes   HIP OR GROIN PAIN REPORTED: No   SYMPTOMS INTERFERING WITH SLEEP: Yes   INSTABILITY: No   IMPACTING QUALITY OF LIFE: Yes     REVIEW OF SYSTEMS  The patient denies any fever, chills, chest pain, shortness of breath or difficulty breathing.  Patient denies any numbness, tingling, or radicular symptoms.  Adult patient history sheet was filled out by the patient today in clinic and will be scanned into the EMR.  I personally reviewed this form which will be scanned into  the EMR.  This includes Past Medical History, Past Surgical History, Medications, Allergies, Social History, Family History and 12 point review of systems.    Medical History[1]     Allergies[2]     Surgical History[3]     Family History[4]     Social History     Socioeconomic History    Marital status:      Spouse name: Not on file    Number of children: Not on file    Years of education: Not on file    Highest education level: Not on file   Occupational History    Not on file   Tobacco Use    Smoking status: Former     Current packs/day: 0.00     Types: Cigarettes     Quit date:      Years since quittin.2     Passive exposure: Never    Smokeless tobacco: Never    Tobacco comments:     Pt denies any illness in past 30 days.      Denies SOB with stairs or activity.     Denies need for assist devices for ambulation.     Denies any personal or family reactions to anesthesia.   Vaping Use    Vaping status: Every Day    Substances: Nicotine   Substance and Sexual Activity    Alcohol use: Yes     Alcohol/week: 10.0 standard drinks of alcohol     Types: 10 Cans of beer per week     Comment: MODERATELY    Drug use: Never    Sexual activity: Defer   Other Topics Concern    Not on file   Social History Narrative    Not on file     Social Drivers of Health     Financial Resource Strain: Low Risk  (2023)    Overall Financial Resource Strain (CARDIA)     Difficulty of Paying Living Expenses: Not hard at all   Food Insecurity: Not on file   Transportation Needs: No Transportation Needs (2023)    PRAPARE - Transportation     Lack of Transportation (Medical): No     Lack of Transportation (Non-Medical): No   Physical Activity: Not on file   Stress: Not on file   Social Connections: Not on file   Intimate Partner Violence: Not on file   Housing Stability: Low Risk  (2023)    Housing Stability Vital Sign     Unable to Pay for Housing in the Last Year: No     Number of Places Lived in the Last Year: 1      Unstable Housing in the Last Year: No        CURRENT MEDICATIONS:   Current Medications[5]     OBJECTIVE    PHYSICAL EXAM  There is no height or weight on file to calculate BMI.    General: Well-appearing male in no acute distress.  Awake, alert and oriented.  Pleasant and cooperative.  Respiratory: Non-labored breathing  Mood: Euthymic   Gait: Antalgic  Assistive Device: None     Affected Left Knee  Skin: Multiple well-healed incisions along lateral side of knee. No signs of infection.   Limb Alignment: Equal  ROM:   Stable to varus and valgus stress at full extension and 30 degrees of flexion  Skin: Intact, no abrasions or draining sinuses  Effusion: None  Quad Strength: 5/5  Hamstring Strength: 5/5  Patella Crepitus: None  Patella Grind: Negative   Tenderness: Medial joint line tenderness  Sensation: Intact to light touch distally  Motor function: Able to fire TA, EHL, G/S  Pulses: Palpable DP pulse    Unaffected Right Knee  Skin: Intact  ROM: 0-120  Effusion: None  No tenderness to palpation on exam    IMAGING:  AP / lateral/ mid-flexion/sunrise views: Independent review of left knee x-rays was performed. The findings were reviewed with the patient. There are mild degenerative changes of the left knee with neutral  limb alignment. There is not joint space narrowing, subchondral sclerosis, and osteophyte formation. No evidence of fracture, AVN, dislocation, osteomyelitis.  Evidence of prior ACL surgery with bone staples intact.       ASSESSMENT & PLAN    IMPRESSION:  Sharath Weiss is a 59 y.o. male with several years of left knee pain in the setting of a complicated surgical history including ACL reconstruction, multiple knee scopes, Justine osteotomy, and septic arthritis s/p I&D in 2018. His radiographs due not demonstrate any significant knee OA and malalignment that would explain his symptoms. Given his persistent knee pain, the decision was made to obtain an MRI for further evaluation.      PLAN:  Patient will obtain a sedated MRI of the left knee in the following weeks. He will return to our office once completed for review.     Ryan Gee MD  PGY-2, Orthopaedic Surgery       [1]   Past Medical History:  Diagnosis Date    BPH (benign prostatic hyperplasia)     Cervical disc disease     Discoid lupus erythematosus     Diverticulitis     s/p bowel resection 2021    Dry eyes     GERD (gastroesophageal reflux disease)     History of sepsis     2020 - left knee    Hyperlipidemia     Hypertension     Insomnia     Irritable bowel syndrome     Lichen planus     Long-term use of immunosuppressant medication     Lumbar stenosis     Osteoarthritis     Spinal stenosis     Superficial phlebitis of arm     Tinnitus     Trigger finger of right thumb     Trigger finger of thumb     Urachal cyst     Vitamin D deficiency    [2]   Allergies  Allergen Reactions    Vancomycin Nausea/vomiting, Anxiety and Tinnitus     Other reaction(s): Intolerance   Tinnitus and shaking   [3]   Past Surgical History:  Procedure Laterality Date    ABSCESS DRAINAGE  2022    abdominal    ANTERIOR CRUCIATE LIGAMENT REPAIR  04/24/2015    Primary Repair Of Knee Ligament Cruciate Anterior    BOWEL RESECTION      ELBOW FRACTURE SURGERY Left     KNEE ARTHROSCOPY W/ DEBRIDEMENT  04/24/2015    Knee Arthroscopy (Therapeutic)    KNEE ARTHROSCOPY W/ HARDWARE REMOVAL Left     2015    LUMBAR FUSION  04/24/2015    Lumbar Vertebral Fusion    LUMBAR LAMINECTOMY  04/24/2015    Laminectomy Lumbar    SHOULDER ARTHROSCOPY  02/22/2018    Arthroscopy Shoulder Right x 2    SHOULDER ARTHROSCOPY Left     x 3    SPINAL FUSION  12/2023    TOTAL HIP ARTHROPLASTY Right 09/09/2024    UPPER GASTROINTESTINAL ENDOSCOPY     [4]   Family History  Problem Relation Name Age of Onset    Breast cancer Mother      Hypertension Father      Transient ischemic attack Father      Irritable bowel syndrome Sister     [5]   Current Outpatient Medications   Medication Sig  Dispense Refill    acetaminophen (Tylenol) 500 mg tablet       alendronate (Fosamax) 70 mg tablet Take 1 tablet (70 mg) by mouth every 7 days. Take in the morning with a full glass of water, on an empty stomach, and do not take anything else by mouth or lie down for the next 30 min. 12 tablet 2    ciclopirox 1 % shampoo Wet hair and apply shampoo to scalp. Lather and leave on for 3 minutes. Rinse. Do this twice a week at night. 120 mL 11    cyclobenzaprine (Flexeril) 10 mg tablet Take 1 tablet (10 mg) by mouth 3 times a day as needed for muscle spasms. 90 tablet 2    diphenhydramine/Maalox/lidocaine (Magic Mouthwash) - Compounded - Outpatient 60 ml TID swish and swallow prn stomatitis and gastritis 480 mL 2    fluticasone (Flonase Sensimist) 27.5 mcg/actuation nasal spray Administer 1 spray into each nostril once daily as needed for rhinitis.      hydroxychloroquine (PlaqueniL) 200 mg tablet Take 1 tablet (200 mg) by mouth once daily. 90 tablet 1    ketoconazole (NIZOral) 2 % shampoo Apply 1 Application topically 2 times a week.      lisinopril 10 mg tablet Take 1 tablet (10 mg) by mouth once daily. 90 tablet 2    meloxicam (Mobic) 15 mg tablet Take 1 tablet (15 mg) by mouth early in the morning..      pantoprazole (ProtoNix) 40 mg EC tablet Take 1 tablet (40 mg) by mouth once daily in the morning. Take before meals. Do not crush, chew, or split. (Patient taking differently: Take 20 mg by mouth once daily in the morning. Take before meals. Do not crush, chew, or split.) 90 tablet 2    rosuvastatin (Crestor) 10 mg tablet Take 1 tablet (10 mg) by mouth once daily. 90 tablet 2    sucralfate (Carafate) 1 gram tablet       [START ON 4/25/2025] zolpidem (Ambien) 10 mg tablet Take 1 tablet (10 mg) by mouth as needed at bedtime for sleep. Do not fill before April 25, 2025. 30 tablet 5     No current facility-administered medications for this visit.

## 2025-04-22 DIAGNOSIS — M81.0 OSTEOPOROSIS WITHOUT CURRENT PATHOLOGICAL FRACTURE, UNSPECIFIED OSTEOPOROSIS TYPE: Primary | ICD-10-CM

## 2025-05-07 ENCOUNTER — APPOINTMENT (OUTPATIENT)
Dept: ORTHOPEDIC SURGERY | Facility: CLINIC | Age: 60
End: 2025-05-07
Payer: COMMERCIAL

## 2025-05-07 DIAGNOSIS — M17.32 POST-TRAUMATIC OSTEOARTHRITIS OF LEFT KNEE: Primary | ICD-10-CM

## 2025-05-07 PROCEDURE — 99214 OFFICE O/P EST MOD 30 MIN: CPT | Performed by: STUDENT IN AN ORGANIZED HEALTH CARE EDUCATION/TRAINING PROGRAM

## 2025-05-07 PROCEDURE — 1036F TOBACCO NON-USER: CPT | Performed by: STUDENT IN AN ORGANIZED HEALTH CARE EDUCATION/TRAINING PROGRAM

## 2025-05-07 NOTE — PROGRESS NOTES
PRIMARY CARE PHYSICIAN: Buster Barney DO  REFERRING PROVIDER: No referring provider defined for this encounter.       SUBJECTIVE  CHIEF COMPLAINT:   Chief Complaint   Patient presents with    Left Knee - Results        HPI: Sharath Weiss is a pleasant 59 y.o. year-old male who is seen today for follow-up evaluation of left knee pain.    The patient has an extensive surgical history    March 1995 left knee ACL reconstruction auto BTB, PMM, Dr. Strickland  May 1995 ADAM left knee for arthrofibrosis, Dr. Strickland  July 1995 left knee arthroscopy, ADAM, MARGARITO, lateral release, Dr. Cavanaugh  November 1996 [place] [facility] [place]  November 2015 removal of hardware/tibial screws, Dr. Cavanaugh  2018 left knee native septic arthritis with left knee open incision and drainage, arthrotomy, open synovectomy, incisional wound VAC, Dr. White     Pain is located in the anterior aspect of the knee.  He is having pain at rest.  He has had numerous instances of knee pain but feels that this is more intense that it has been in the past.  He is unable to swim or really exercise.  He is frustrated by his limitations as a result of the pain.    FUNCTIONAL STATUS: not limited.  AMBULATORY STATUS: Independent community ambulation without devices  PREVIOUS TREATMENTS: physical therapy, over the counter medications, and assistive device   HISTORY OF SURGERY ON AFFECTED KNEE(S): Yes   HIP OR GROIN PAIN REPORTED: No   SYMPTOMS INTERFERING WITH SLEEP: Yes   INSTABILITY: No   IMPACTING QUALITY OF LIFE: Yes     REVIEW OF SYSTEMS  There has been no interval change in this patient's past medical, surgical, medications, allergies, family history or social history since the most recent visit to a provider within our department.  14 point review of systems was performed, reviewed, and negative except for pertinent positives documented in the history of present illness.    Medical History[1]     Allergies[2]     Surgical  History[3]     Family History[4]     Social History     Socioeconomic History    Marital status:      Spouse name: Not on file    Number of children: Not on file    Years of education: Not on file    Highest education level: Not on file   Occupational History    Not on file   Tobacco Use    Smoking status: Former     Current packs/day: 0.00     Types: Cigarettes     Quit date:      Years since quittin.3     Passive exposure: Never    Smokeless tobacco: Never    Tobacco comments:     Pt denies any illness in past 30 days.      Denies SOB with stairs or activity.     Denies need for assist devices for ambulation.     Denies any personal or family reactions to anesthesia.   Vaping Use    Vaping status: Every Day    Substances: Nicotine   Substance and Sexual Activity    Alcohol use: Yes     Alcohol/week: 10.0 standard drinks of alcohol     Types: 10 Cans of beer per week     Comment: MODERATELY    Drug use: Never    Sexual activity: Defer   Other Topics Concern    Not on file   Social History Narrative    Not on file     Social Drivers of Health     Financial Resource Strain: Low Risk  (2023)    Overall Financial Resource Strain (CARDIA)     Difficulty of Paying Living Expenses: Not hard at all   Food Insecurity: Not on file   Transportation Needs: No Transportation Needs (2023)    PRAPARE - Transportation     Lack of Transportation (Medical): No     Lack of Transportation (Non-Medical): No   Physical Activity: Not on file   Stress: Not on file   Social Connections: Not on file   Intimate Partner Violence: Not on file   Housing Stability: Low Risk  (2023)    Housing Stability Vital Sign     Unable to Pay for Housing in the Last Year: No     Number of Places Lived in the Last Year: 1     Unstable Housing in the Last Year: No        CURRENT MEDICATIONS:   Current Medications[5]     OBJECTIVE    PHYSICAL EXAM  There is no height or weight on file to calculate BMI.    General:  Well-appearing male in no acute distress.  Awake, alert and oriented.  Pleasant and cooperative.  Respiratory: Non-labored breathing  Mood: Euthymic   Gait: Antalgic  Assistive Device: None     Affected Left Knee  Skin: Multiple well-healed incisions along lateral side of knee. No signs of infection.   Limb Alignment: Equal  ROM:   Stable to varus and valgus stress at full extension and 30 degrees of flexion  Skin: Intact, no abrasions or draining sinuses  Effusion: Mild to moderate  Quad Strength: 5/5  Hamstring Strength: 5/5  Patella Crepitus: None  Patella Grind: Negative   Tenderness: Medial joint line tenderness  Sensation: Intact to light touch distally  Motor function: Able to fire TA, EHL, G/S  Pulses: Palpable DP pulse    IMAGING:  MRI was obtained in outside hospital.  Results were available and discussed with the patient.  The patient does have a joint effusion.  The patient has significant cartilage thinning in the patellofemoral joint.  Degenerative changes of both menisci.  There are areas of full-thickness cartilage defects in the medial compartment.  Lateral compartment appears to be intact without evidence of significant OA.      ASSESSMENT & PLAN    IMPRESSION:  Sharath Weiss is a 59 y.o. male with several years of left knee pain in the setting of a complicated surgical history including ACL reconstruction, multiple knee scopes, Justine osteotomy, and septic arthritis s/p I&D in 2018.     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    PLAN:  I would recommend that the patient go for hyaluronic acid injections.  I do not believe the corticosteroid injections would be the best treatment option for this patient given his history of septic arthritis.  Accordingly, we discussed the relative indication for hyaluronic acid injections and lieu of corticosteroid injection.    I have referred  the patient to my partner for consideration of hyaluronic acid injections.  Again, I do not believe that he is an appropriate candidate for steroid injection for that reason, I think it is medically necessary and appropriate for hyaluronic acid injections.  He has failed conservative treat measures including activity modification, physical therapy/home exercises and over-the-counter medications.    I will see the patient back 2 months after the hyaluronic acid injections.  Please obtain left hip x-rays at that time.    *This note was created using voice recognition software and was not corrected for typographical or grammatical errors.*         [1]   Past Medical History:  Diagnosis Date    BPH (benign prostatic hyperplasia)     Cervical disc disease     Discoid lupus erythematosus     Diverticulitis     s/p bowel resection 2021    Dry eyes     GERD (gastroesophageal reflux disease)     History of sepsis     2020 - left knee    Hyperlipidemia     Hypertension     Insomnia     Irritable bowel syndrome     Lichen planus     Long-term use of immunosuppressant medication     Lumbar stenosis     Osteoarthritis     Spinal stenosis     Superficial phlebitis of arm     Tinnitus     Trigger finger of right thumb     Trigger finger of thumb     Urachal cyst     Vitamin D deficiency    [2]   Allergies  Allergen Reactions    Vancomycin Nausea/vomiting, Anxiety and Tinnitus     Other reaction(s): Intolerance   Tinnitus and shaking   [3]   Past Surgical History:  Procedure Laterality Date    ABSCESS DRAINAGE  2022    abdominal    ANTERIOR CRUCIATE LIGAMENT REPAIR  04/24/2015    Primary Repair Of Knee Ligament Cruciate Anterior    BOWEL RESECTION      ELBOW FRACTURE SURGERY Left     KNEE ARTHROSCOPY W/ DEBRIDEMENT  04/24/2015    Knee Arthroscopy (Therapeutic)    KNEE ARTHROSCOPY W/ HARDWARE REMOVAL Left     2015    LUMBAR FUSION  04/24/2015    Lumbar Vertebral Fusion    LUMBAR LAMINECTOMY  04/24/2015    Laminectomy Lumbar     SHOULDER ARTHROSCOPY  02/22/2018    Arthroscopy Shoulder Right x 2    SHOULDER ARTHROSCOPY Left     x 3    SPINAL FUSION  12/2023    TOTAL HIP ARTHROPLASTY Right 09/09/2024    UPPER GASTROINTESTINAL ENDOSCOPY     [4]   Family History  Problem Relation Name Age of Onset    Breast cancer Mother      Hypertension Father      Transient ischemic attack Father      Irritable bowel syndrome Sister     [5]   Current Outpatient Medications   Medication Sig Dispense Refill    acetaminophen (Tylenol) 500 mg tablet       alendronate (Fosamax) 70 mg tablet Take 1 tablet (70 mg) by mouth every 7 days. Take in the morning with a full glass of water, on an empty stomach, and do not take anything else by mouth or lie down for the next 30 min. 12 tablet 2    ciclopirox 1 % shampoo Wet hair and apply shampoo to scalp. Lather and leave on for 3 minutes. Rinse. Do this twice a week at night. 120 mL 11    cyclobenzaprine (Flexeril) 10 mg tablet Take 1 tablet (10 mg) by mouth 3 times a day as needed for muscle spasms. 90 tablet 2    diphenhydramine/Maalox/lidocaine (Magic Mouthwash) - Compounded - Outpatient 60 ml TID swish and swallow prn stomatitis and gastritis 480 mL 2    fluticasone (Flonase Sensimist) 27.5 mcg/actuation nasal spray Administer 1 spray into each nostril once daily as needed for rhinitis.      hydroxychloroquine (PlaqueniL) 200 mg tablet Take 1 tablet (200 mg) by mouth once daily. 90 tablet 1    ketoconazole (NIZOral) 2 % shampoo Apply 1 Application topically 2 times a week.      lisinopril 10 mg tablet Take 1 tablet (10 mg) by mouth once daily. 90 tablet 2    meloxicam (Mobic) 15 mg tablet Take 1 tablet (15 mg) by mouth early in the morning..      pantoprazole (ProtoNix) 40 mg EC tablet Take 1 tablet (40 mg) by mouth once daily in the morning. Take before meals. Do not crush, chew, or split. (Patient taking differently: Take 20 mg by mouth once daily in the morning. Take before meals. Do not crush, chew, or split.) 90  tablet 2    rosuvastatin (Crestor) 10 mg tablet Take 1 tablet (10 mg) by mouth once daily. 90 tablet 2    sucralfate (Carafate) 1 gram tablet       zolpidem (Ambien) 10 mg tablet Take 1 tablet (10 mg) by mouth as needed at bedtime for sleep. Do not fill before April 25, 2025. 30 tablet 5     No current facility-administered medications for this visit.

## 2025-05-20 ENCOUNTER — APPOINTMENT (OUTPATIENT)
Dept: ORTHOPEDIC SURGERY | Facility: CLINIC | Age: 60
End: 2025-05-20
Payer: COMMERCIAL

## 2025-05-20 DIAGNOSIS — M17.32 POST-TRAUMATIC OSTEOARTHRITIS OF LEFT KNEE: Primary | ICD-10-CM

## 2025-05-20 PROCEDURE — 99214 OFFICE O/P EST MOD 30 MIN: CPT | Performed by: FAMILY MEDICINE

## 2025-05-20 NOTE — PROGRESS NOTES
** Please excuse any errors in grammar or translation related to this dictation. Voice recognition software was utilized to prepare this document. **    Assessment & Plan:  Patient referred for non-operative management of post-traumatic left knee OA with exacerbation of symptoms the past 2 months. Recently met with Dr. Alfie Vasquez that advised against CSI given patient's history of septic left knee joint. At this time, patient is not ready to pursue knee arthroplasty.  We discussed additional nonoperative management options to include hyaluronic acid injections and genicular nerve ablation.  Agree that given his prior history of septic joint it would not be ideal for intra-articular corticosteroid injection.  Patient reports remote hyaluronic acid injection completion but cannot recall its effectiveness. Patient is considered a good candidate for completion of viscosupplement injection. We will obtain prior authorization to complete and patient will be scheduled once approved.  If the injection provides symptomatic relief, can repeat every 6 months.  If not effective, patient can consider referral for genicular ablation versus arthroplasty.  Patient has follow-up with Dr. Alfie Vasquez scheduled for July.      Chief complaint:  Left knee pain    HPI:  58 y/o patient, hx of multiple knee surgeries listed below, right total hip arthroplasty, lumbar spinal fusion, presents with left knee pain.  He has had chronic knee pain since initial ACL recon in 1995.  However pain levels have been manageable the last several years up until 2 months ago.  Notes he was doing aqua rehab following hip arthroplasty when he began to experience increasing left knee pain. No specific injury recalled. Symptoms have progressively worsened with time.  Pain is most prominent at medial knee.  It is associated with sensation of stiffness, quad muscle weakness.  Symptoms are aggravated by walking, stairs, and daily activity. He has not been able to  swim or exercise. Treatment to date includes tylenol, ibuprofen, physical therapy, bracing, topical creams, meloxicam, cortisone injection 2021 with no relief, gel injection 10+ years ago. Previously saw Dr. Hines for this with last visit being 5/7/25. He did not want to repeat a cortisone injection due to history of septic arthritis and the immunosuppression factor of steroids.     March 1995 left knee ACL reconstruction auto BTB, PMM, Dr. Strickland  May 1995 ADAM left knee for arthrofibrosis, Dr. Strickland  July 1995 left knee arthroscopy, ADAM, MARGARITO, lateral release, Dr. Cavanaugh  November 1996 left knee diagnostic arthroscopy, [facility] [place] and [place], Dr. Cavanaugh  November 2015 removal of hardware/tibial screws, Dr. Cavanaugh  2018 left knee native septic arthritis with left knee open incision and drainage, arthrotomy, open synovectomy, incisional wound VAC, Dr. White     Problem List[1]  Medical History[2]  Surgical History[3]  Medications Ordered Prior to Encounter[4]    Exam:  General Exam:  Constitutional - NAD, AAO x 3, conversing appropriately.  HEENT- Normocephalic and atraumatic. No facial deformities. Hearing grossly normal.  Lungs - Breathing non-labored with normal rate. No accessory muscle use.  CV - Extremities warm and well-perfused, brisk capillary refill present.   Neuro - CN II-XII grossly intact.    LEFT Knee examined. No effusion, ecchymosis, warmth or erythema. Multiple well-healed incisions along anterolateral knee.  AROM from 10 to 110 deg with 5/5 strength. SILT overlying knee. Retropatellar crepitus present. Tenderness along medial  joint line.  No popliteal mass palpated. Negative anterior and posterior drawer.  No laxity to varus or valgus stress at 0 or 30 deg.  No patellar apprehension.      Results:  Xrays of left knee obtained 4/18/25 personally interpreted as moderate tricompartmental degenerative changes with joint space narrowing, osteophyte formation, and subchondral sclerosis.  Metallic  staples from prior surgery.     Reviewed referral note 5/7/25.   Lab Results   Component Value Date    HGBA1C 5.6 09/16/2022    HGBA1C 5.5 07/01/2022    CREATININE 0.88 10/08/2024    EGFR >90 10/08/2024          [1]   Patient Active Problem List  Diagnosis    Abdominal cramping, generalized    Abdominal discomfort, generalized    Abdominal pain    Allergic rhinitis    Arthritis of neck    BPH without urinary obstruction    Cervico-occipital neuralgia    Chronic low back pain    Chronic pain syndrome    Chronic pain of left knee    Constipation    Diverticulitis of rectosigmoid    DJD (degenerative joint disease)    Femoroacetabular impingement    Hematuria, undiagnosed cause    Hyperlipidemia    Hypertension, essential    Insomnia    Lichen planus    Peripheral neuropathy    Urachal cyst    Yeast dermatitis of penis    Arthritis of elbow, left    Osteoarthritis of right hip    Rash and other nonspecific skin eruption    Right hip pain    Sebaceous cyst    Tinea unguium    Trigger finger of right thumb    Lumbar stenosis    Neurogenic claudication due to lumbar spinal stenosis    Spinal stenosis of lumbar region with neurogenic claudication    Phlebitis of superficial vein of upper extremity    Superficial phlebitis of arm    Trigger thumb of right hand    Dry eye syndrome    Cutaneous lupus erythematosus    Long-term current use of hydroxycarbamide    Systemic lupus erythematosus, organ or system involvement unspecified (Multi)    Osteoporosis without current pathological fracture    Encounter for monitoring of hydroxychloroquine therapy   [2]   Past Medical History:  Diagnosis Date    BPH (benign prostatic hyperplasia)     Cervical disc disease     Discoid lupus erythematosus     Diverticulitis     s/p bowel resection 2021    Dry eyes     GERD (gastroesophageal reflux disease)     History of sepsis     2020 - left knee    Hyperlipidemia     Hypertension     Insomnia     Irritable bowel syndrome     Lichen planus      Long-term use of immunosuppressant medication     Lumbar stenosis     Osteoarthritis     Spinal stenosis     Superficial phlebitis of arm     Tinnitus     Trigger finger of right thumb     Trigger finger of thumb     Urachal cyst     Vitamin D deficiency    [3]   Past Surgical History:  Procedure Laterality Date    ABSCESS DRAINAGE  2022    abdominal    ANTERIOR CRUCIATE LIGAMENT REPAIR  04/24/2015    Primary Repair Of Knee Ligament Cruciate Anterior    BOWEL RESECTION      ELBOW FRACTURE SURGERY Left     KNEE ARTHROSCOPY W/ DEBRIDEMENT  04/24/2015    Knee Arthroscopy (Therapeutic)    KNEE ARTHROSCOPY W/ HARDWARE REMOVAL Left     2015    LUMBAR FUSION  04/24/2015    Lumbar Vertebral Fusion    LUMBAR LAMINECTOMY  04/24/2015    Laminectomy Lumbar    SHOULDER ARTHROSCOPY  02/22/2018    Arthroscopy Shoulder Right x 2    SHOULDER ARTHROSCOPY Left     x 3    SPINAL FUSION  12/2023    TOTAL HIP ARTHROPLASTY Right 09/09/2024    UPPER GASTROINTESTINAL ENDOSCOPY     [4]   Current Outpatient Medications on File Prior to Visit   Medication Sig Dispense Refill    acetaminophen (Tylenol) 500 mg tablet       alendronate (Fosamax) 70 mg tablet Take 1 tablet (70 mg) by mouth every 7 days. Take in the morning with a full glass of water, on an empty stomach, and do not take anything else by mouth or lie down for the next 30 min. 12 tablet 2    ciclopirox 1 % shampoo Wet hair and apply shampoo to scalp. Lather and leave on for 3 minutes. Rinse. Do this twice a week at night. 120 mL 11    cyclobenzaprine (Flexeril) 10 mg tablet Take 1 tablet (10 mg) by mouth 3 times a day as needed for muscle spasms. 90 tablet 2    diphenhydramine/Maalox/lidocaine (Magic Mouthwash) - Compounded - Outpatient 60 ml TID swish and swallow prn stomatitis and gastritis 480 mL 2    fluticasone (Flonase Sensimist) 27.5 mcg/actuation nasal spray Administer 1 spray into each nostril once daily as needed for rhinitis.      hydroxychloroquine (PlaqueniL) 200 mg  tablet Take 1 tablet (200 mg) by mouth once daily. 90 tablet 1    ketoconazole (NIZOral) 2 % shampoo Apply 1 Application topically 2 times a week.      lisinopril 10 mg tablet Take 1 tablet (10 mg) by mouth once daily. 90 tablet 2    meloxicam (Mobic) 15 mg tablet Take 1 tablet (15 mg) by mouth early in the morning..      pantoprazole (ProtoNix) 40 mg EC tablet Take 1 tablet (40 mg) by mouth once daily in the morning. Take before meals. Do not crush, chew, or split. (Patient taking differently: Take 20 mg by mouth once daily in the morning. Take before meals. Do not crush, chew, or split.) 90 tablet 2    rosuvastatin (Crestor) 10 mg tablet Take 1 tablet (10 mg) by mouth once daily. 90 tablet 2    sucralfate (Carafate) 1 gram tablet       zolpidem (Ambien) 10 mg tablet Take 1 tablet (10 mg) by mouth as needed at bedtime for sleep. Do not fill before April 25, 2025. 30 tablet 5     No current facility-administered medications on file prior to visit.

## 2025-05-23 DIAGNOSIS — M17.32 POST-TRAUMATIC OSTEOARTHRITIS OF LEFT KNEE: ICD-10-CM

## 2025-05-23 DIAGNOSIS — M17.12 ARTHRITIS OF LEFT KNEE: ICD-10-CM

## 2025-05-23 RX ORDER — HYALURONATE SODIUM, STABILIZED 60 MG/3 ML
60 SYRINGE (ML) INTRAARTICULAR ONCE
Qty: 3 ML | Refills: 0 | Status: SHIPPED | OUTPATIENT
Start: 2025-05-23 | End: 2025-05-23

## 2025-06-11 ENCOUNTER — HOSPITAL ENCOUNTER (OUTPATIENT)
Dept: RADIOLOGY | Facility: EXTERNAL LOCATION | Age: 60
Discharge: HOME | End: 2025-06-11

## 2025-06-11 ENCOUNTER — APPOINTMENT (OUTPATIENT)
Dept: ORTHOPEDIC SURGERY | Facility: CLINIC | Age: 60
End: 2025-06-11
Payer: COMMERCIAL

## 2025-06-11 DIAGNOSIS — M17.32 POST-TRAUMATIC OSTEOARTHRITIS OF LEFT KNEE: ICD-10-CM

## 2025-06-11 PROCEDURE — 20611 DRAIN/INJ JOINT/BURSA W/US: CPT | Performed by: FAMILY MEDICINE

## 2025-06-11 PROCEDURE — 99212 OFFICE O/P EST SF 10 MIN: CPT | Performed by: FAMILY MEDICINE

## 2025-06-11 RX ORDER — PREGABALIN 25 MG/1
CAPSULE ORAL DAILY
COMMUNITY
Start: 2025-06-07

## 2025-06-11 NOTE — PROGRESS NOTES
** Please excuse any errors in grammar or translation related to this dictation. Voice recognition software was utilized to prepare this document. **    Assessment & Plan:  Durolane injection  completed today for patient's left knee post-traumatic arthritis. Discussed with patient that it can take around 1-2 weeks for pain relief to occur. Informed patient they may have slight increased discomfort over the next week following injection. During this time, can continue normal pain relieving strategies (ice, heat, analgesics, etc.) as needed. May also want to consider taking an OTC anti-histamine such loratadine (Claritin) 10mg for 3 to 5 days if having pain as this may be due to histamine release. Reviewed signs of septic joint and should seek eval if present. This injection can be repeated again in 6 months if providing symptomatic relief.    Patient has follow-up with Dr. Alfie Vasquez scheduled for July.      Chief complaint:  Left knee pain    HPI:  6/11/25:  Patient follows up for left knee pain.  His Durolane injection was approved.  Denies any new injuries. Knee is slightly improved from the last visit.     5/20/25: 60 y/o patient, hx of multiple knee surgeries listed below, right total hip arthroplasty, lumbar spinal fusion, presents with left knee pain.  He has had chronic knee pain since initial ACL recon in 1995.  However pain levels have been manageable the last several years up until 2 months ago.  Notes he was doing aqua rehab following hip arthroplasty when he began to experience increasing left knee pain. No specific injury recalled. Symptoms have progressively worsened with time.  Pain is most prominent at medial knee.  It is associated with sensation of stiffness, quad muscle weakness.  Symptoms are aggravated by walking, stairs, and daily activity. He has not been able to swim or exercise. Treatment to date includes tylenol, ibuprofen, physical therapy, bracing, topical creams, meloxicam, cortisone  injection 2021 with no relief, gel injection 10+ years ago. Previously saw Dr. Hines for this with last visit being 5/7/25. He did not want to repeat a cortisone injection due to history of septic arthritis and the immunosuppression factor of steroids.     March 1995 left knee ACL reconstruction auto BTB, PMM, Dr. Strickland  May 1995 ADAM left knee for arthrofibrosis, Dr. Strickland  July 1995 left knee arthroscopy, ADAM, MARGARITO, lateral release, Dr. Cavanaugh  November 1996 left knee diagnostic arthroscopy, [facility] [place] and [place], Dr. Cavanaugh  November 2015 removal of hardware/tibial screws, Dr. Cavanaugh  2018 left knee native septic arthritis with left knee open incision and drainage, arthrotomy, open synovectomy, incisional wound VAC, Dr. White     Problem List[1]  Medical History[2]  Surgical History[3]  Medications Ordered Prior to Encounter[4]    Exam:  General Exam:  Constitutional - NAD, AAO x 3, conversing appropriately.  HEENT- Normocephalic and atraumatic. No facial deformities. Hearing grossly normal.  Lungs - Breathing non-labored with normal rate. No accessory muscle use.  CV - Extremities warm and well-perfused, brisk capillary refill present.   Neuro - CN II-XII grossly intact.    LEFT Knee examined. No effusion, ecchymosis, warmth or erythema. Multiple well-healed incisions along anterolateral knee.  AROM from 10 to 110 deg with 5/5 strength. SILT overlying knee. Retropatellar crepitus present. Mild tenderness along medial  joint line.     Results:  Xrays of left knee obtained 4/18/25: moderate tricompartmental degenerative changes with joint space narrowing, osteophyte formation, and subchondral sclerosis.  Metallic staples from prior surgery.     Lab Results   Component Value Date    HGBA1C 5.6 09/16/2022    HGBA1C 5.5 07/01/2022    CREATININE 0.88 10/08/2024    EGFR >90 10/08/2024     Procedure:  Patient ID: Sharath Weiss is a 59 y.o. male.    L Inj/Asp: L knee on 6/11/2025 3:40 PM  Indications: pain  Details:  21 G needle, ultrasound-guided superolateral approach  Medications: 60 mg sodium hyaluronate 60 mg/3 mL  Outcome: tolerated well, no immediate complications    Procedure risk factors to include increased pain, bleeding, infection, neurovascular injury, soft tissue injury, and adverse reaction to medication were discussed with the patient. Patient understands there is a moderate risk of morbidity from undergoing the procedure.  Procedure, treatment alternatives, risks and benefits explained, specific risks discussed. Consent was given by the patient. Immediately prior to procedure a time out was called to verify the correct patient, procedure, equipment, support staff and site/side marked as required. Patient was prepped and draped in the usual sterile fashion.              [1]   Patient Active Problem List  Diagnosis    Abdominal cramping, generalized    Abdominal discomfort, generalized    Abdominal pain    Allergic rhinitis    Arthritis of neck    BPH without urinary obstruction    Cervico-occipital neuralgia    Chronic low back pain    Chronic pain syndrome    Chronic pain of left knee    Constipation    Diverticulitis of rectosigmoid    DJD (degenerative joint disease)    Femoroacetabular impingement    Hematuria, undiagnosed cause    Hyperlipidemia    Hypertension, essential    Insomnia    Lichen planus    Peripheral neuropathy    Urachal cyst    Yeast dermatitis of penis    Arthritis of elbow, left    Osteoarthritis of right hip    Rash and other nonspecific skin eruption    Right hip pain    Sebaceous cyst    Tinea unguium    Trigger finger of right thumb    Lumbar stenosis    Neurogenic claudication due to lumbar spinal stenosis    Spinal stenosis of lumbar region with neurogenic claudication    Phlebitis of superficial vein of upper extremity    Superficial phlebitis of arm    Trigger thumb of right hand    Dry eye syndrome    Cutaneous lupus erythematosus    Long-term current use of hydroxycarbamide     Systemic lupus erythematosus, organ or system involvement unspecified (Multi)    Osteoporosis without current pathological fracture    Encounter for monitoring of hydroxychloroquine therapy   [2]   Past Medical History:  Diagnosis Date    BPH (benign prostatic hyperplasia)     Cervical disc disease     Discoid lupus erythematosus     Diverticulitis     s/p bowel resection 2021    Dry eyes     GERD (gastroesophageal reflux disease)     History of sepsis     2020 - left knee    Hyperlipidemia     Hypertension     Insomnia     Irritable bowel syndrome     Lichen planus     Long-term use of immunosuppressant medication     Lumbar stenosis     Osteoarthritis     Spinal stenosis     Superficial phlebitis of arm     Tinnitus     Trigger finger of right thumb     Trigger finger of thumb     Urachal cyst     Vitamin D deficiency    [3]   Past Surgical History:  Procedure Laterality Date    ABSCESS DRAINAGE  2022    abdominal    ANTERIOR CRUCIATE LIGAMENT REPAIR  04/24/2015    Primary Repair Of Knee Ligament Cruciate Anterior    BOWEL RESECTION      ELBOW FRACTURE SURGERY Left     KNEE ARTHROSCOPY W/ DEBRIDEMENT  04/24/2015    Knee Arthroscopy (Therapeutic)    KNEE ARTHROSCOPY W/ HARDWARE REMOVAL Left     2015    LUMBAR FUSION  04/24/2015    Lumbar Vertebral Fusion    LUMBAR LAMINECTOMY  04/24/2015    Laminectomy Lumbar    SHOULDER ARTHROSCOPY  02/22/2018    Arthroscopy Shoulder Right x 2    SHOULDER ARTHROSCOPY Left     x 3    SPINAL FUSION  12/2023    TOTAL HIP ARTHROPLASTY Right 09/09/2024    UPPER GASTROINTESTINAL ENDOSCOPY     [4]   Current Outpatient Medications on File Prior to Visit   Medication Sig Dispense Refill    acetaminophen (Tylenol) 500 mg tablet       alendronate (Fosamax) 70 mg tablet Take 1 tablet (70 mg) by mouth every 7 days. Take in the morning with a full glass of water, on an empty stomach, and do not take anything else by mouth or lie down for the next 30 min. 12 tablet 2    ciclopirox 1 % shampoo  Wet hair and apply shampoo to scalp. Lather and leave on for 3 minutes. Rinse. Do this twice a week at night. 120 mL 11    cyclobenzaprine (Flexeril) 10 mg tablet Take 1 tablet (10 mg) by mouth 3 times a day as needed for muscle spasms. 90 tablet 2    diphenhydramine/Maalox/lidocaine (Magic Mouthwash) - Compounded - Outpatient 60 ml TID swish and swallow prn stomatitis and gastritis 480 mL 2    fluticasone (Flonase Sensimist) 27.5 mcg/actuation nasal spray Administer 1 spray into each nostril once daily as needed for rhinitis.      hydroxychloroquine (PlaqueniL) 200 mg tablet Take 1 tablet (200 mg) by mouth once daily. 90 tablet 1    ketoconazole (NIZOral) 2 % shampoo Apply 1 Application topically 2 times a week.      lisinopril 10 mg tablet Take 1 tablet (10 mg) by mouth once daily. 90 tablet 2    meloxicam (Mobic) 15 mg tablet Take 1 tablet (15 mg) by mouth early in the morning..      pantoprazole (ProtoNix) 40 mg EC tablet Take 1 tablet (40 mg) by mouth once daily in the morning. Take before meals. Do not crush, chew, or split. (Patient taking differently: Take 20 mg by mouth once daily in the morning. Take before meals. Do not crush, chew, or split.) 90 tablet 2    rosuvastatin (Crestor) 10 mg tablet Take 1 tablet (10 mg) by mouth once daily. 90 tablet 2    sucralfate (Carafate) 1 gram tablet       zolpidem (Ambien) 10 mg tablet Take 1 tablet (10 mg) by mouth as needed at bedtime for sleep. Do not fill before April 25, 2025. 30 tablet 5     No current facility-administered medications on file prior to visit.

## 2025-06-16 ENCOUNTER — OFFICE VISIT (OUTPATIENT)
Facility: CLINIC | Age: 60
End: 2025-06-16
Payer: COMMERCIAL

## 2025-06-16 VITALS
BODY MASS INDEX: 26.18 KG/M2 | HEART RATE: 82 BPM | DIASTOLIC BLOOD PRESSURE: 84 MMHG | WEIGHT: 187 LBS | HEIGHT: 71 IN | SYSTOLIC BLOOD PRESSURE: 134 MMHG | TEMPERATURE: 97.9 F

## 2025-06-16 DIAGNOSIS — M54.12 CERVICAL RADICULOPATHY: Primary | ICD-10-CM

## 2025-06-16 PROCEDURE — 3079F DIAST BP 80-89 MM HG: CPT | Performed by: STUDENT IN AN ORGANIZED HEALTH CARE EDUCATION/TRAINING PROGRAM

## 2025-06-16 PROCEDURE — 3008F BODY MASS INDEX DOCD: CPT | Performed by: STUDENT IN AN ORGANIZED HEALTH CARE EDUCATION/TRAINING PROGRAM

## 2025-06-16 PROCEDURE — 3075F SYST BP GE 130 - 139MM HG: CPT | Performed by: STUDENT IN AN ORGANIZED HEALTH CARE EDUCATION/TRAINING PROGRAM

## 2025-06-16 PROCEDURE — 1036F TOBACCO NON-USER: CPT | Performed by: STUDENT IN AN ORGANIZED HEALTH CARE EDUCATION/TRAINING PROGRAM

## 2025-06-16 PROCEDURE — 99215 OFFICE O/P EST HI 40 MIN: CPT | Performed by: STUDENT IN AN ORGANIZED HEALTH CARE EDUCATION/TRAINING PROGRAM

## 2025-06-16 RX ORDER — PANTOPRAZOLE SODIUM 20 MG/1
20 TABLET, DELAYED RELEASE ORAL
COMMUNITY
Start: 2025-04-01

## 2025-06-16 NOTE — PROGRESS NOTES
[facility] [place]  Department of Neurological Surgery  Established Patient Visit    History of Present Illness:  Sharath Weiss is a 59 y.o. year old male who presents to the spine clinic in follow up with chronic back pain. He has been having 5 months of increasing back pain, groin pain, and radiating pain into anterior thigh in the L2-3 distribution. The pain wakes up him at night. He has attempted conservative care with a home physical therapy program and medication. He sees Dr. Randy Walsh in pain management and has not had any recent injections in his low back. He has a hard time walking and performing normal day-to-day activities secondary to pain. 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 Main pain is 70% in his leg. He does not smoke. No diabetes. No history of heart disease. No prior neck surgeries.      Since his last visit, he had a 3rd opinion for his low back and had a 2 level lateral fusion with Dr. Quiros at  at the end of December 2023. Subsequently recovered well from this and is now having neck issues. He has significant neck pain through his shoulder, no pain shooting into his arm, but numbness and pain in his right hand in the C6 and C7 areas. He had an EMG 3 months ago which was negative for carpal tunnel or entrapments. He has predominately symptoms R>L. He has attempted conservative care for the past year. He has seen multiple surgeons and got multiple opinions, most recently Dr. Trujillo and Dr. Quiros. He had 2 injections which gave temporary improvement in symptoms.    Patient's BMI is Body mass index is 26.08 kg/m².    Diabetic: no       Osteoporosis: no  No DXA results found for the past 12 months    Review of Systems:  14/14 systems reviewed and negative other than what is listed in the history of present illness    Problem List[1]  Medical History[2]  Surgical History[3]  Social History     Tobacco Use    Smoking status: Former     Current  packs/day: 0.00     Types: Cigarettes     Quit date: 2018     Years since quittin.4     Passive exposure: Never    Smokeless tobacco: Never    Tobacco comments:     Pt denies any illness in past 30 days.      Denies SOB with stairs or activity.     Denies need for assist devices for ambulation.     Denies any personal or family reactions to anesthesia.   Substance Use Topics    Alcohol use: Yes     Alcohol/week: 10.0 standard drinks of alcohol     Types: 10 Cans of beer per week     Comment: MODERATELY     family history includes Breast cancer in his mother; Hypertension in his father; Irritable bowel syndrome in his sister; Transient ischemic attack in his father.  Current Medications[4]  Allergies[5]    Physical Examination:    General: Well developed, awake/alert/oriented x3, no distress, alert and cooperative  Skin: Warm and dry, no lesions, no rashes  ENMT: Mucous membranes moist, no apparent injury, no lesions seen  Head/Neck: Neck Supple, no apparent injury  Respiratory/Thorax: Normal breath sounds with good chest expansion, thorax symmetric  Cardiovascular: No pitting edema, no JVD    Motor Strength: 5/5 Throughout all extremities    Muscle Bulk: Normal and symmetric in all extremities    Posture:   -- Cervical: Normal  -- Thoracic: Normal  -- Lumbar : Normal  Paraspinal muscle spasm/tenderness absent.     Sensation: intact to light touch    Neck pain and UE radiculopathy    Results:  I personally reviewed and interpreted the imaging results which included MRI C spine shows C3-7 multi level degenerative disc disease and moderate to severe foraminal stenosis C5-6, C6-7 R>L .     Assessment and Plan:      Sharath Weiss is a 59 y.o. year old male who presents to the spine clinic in follow up with new signs of R arm hand pain and numbness, neck pain. MRI with severe degenerative disc disease. He has attempted conservative care for the past year. He has seen multiple surgeons and got multiple opinions, most  recently Dr. Jackson and Dr. Quiros. He had 2 injections which gave temporary improvement in symptoms.    At this point he has failed conservative care.    I have reviewed imaging and diagnosis with the patient, discussed the natural history of their disease and both non-operative and operative treatments available and rationale vs risks for both.    The patient's clinical symptoms correlates well with the radiological findings. He has been having significant functional impairment with decreased ability to perform her normal activities of daily living. They have tried treatment options including medications (NSAIDs/narcotics/muscle relaxants/membrane stabilizers), formal physical therapy, and injections.     I offered the option of surgery that would consist of an 2 level anterior cervical discectomy fusion and plating C5-6 6-7.     I have explained the surgical procedure in detail with expected duration and extent of recovery along risks of surgery that include, but is not limited to bleeding, infection, blood vessel injury or damage, loss of sensation, loss of bladder, bowel or sexual function, nerve injury/damage resulting in weakness/paralysis, malunion, nonunion, CSF leak, brachial plexus injury, peripheral vision blindness, failure of implants/fusion, failure to relieve symptoms, recurrent disease, adjacent segment disease, need to reoperate for any reason and general anesthesia reaction such as stroke, coma, heart attack, delirium, confusion, death as well as worsening of preexisted medical conditions. I have also discussed with the patient the chances of C5 palsy.     I clearly emphasized that while the goal of surgery is to decompress the spinal cord so as to ARREST the progression of neurological deficits - preexisting deficits may or may not improve after surgery. We discussed that up to 90% of patients do show clinically significant improvement in functional and neurological outcomes following decompression  of the spinal cord in patients with cervical degenerative myelopathy or radiculopathy the extent of which is variable and depends on the severity of myelopathy, duration of deficits and age of the patient.    All questions were answered and the patient left satisfied with the surgical plan moving forward.     I have reviewed all prior documentation and reviewed the electronic medical record since admission. I have personally have reviewed all advanced imaging not just the reports and used my interpretation as documented as the relevant findings. I have reviewed the risks and benefits of all treatment recommendations listed in this note with the patient and family.       The above clinical summary has been dictated with voice recognition software. It has not been proofread for grammatical errors, typographical mistakes, or other semantic inconsistencies.    Thank you for visiting our office today. It was our pleasure to take part in your healthcare.     Do not hesitate to call with any questions regarding your plan of care after leaving at [phone] M-F 8am-4pm.     To clinicians, thank you very much for this kind referral. It is a privilege to partner with you in the care of your patients. My office would be delighted to assist you with any further consultations or with questions regarding the plan of care outlined. Do not hesitate to call the office or contact me directly.       Sincerely,      Spencer Maradiaga MD, [facility]  Spine , [facility]  Remigio Haider Chair in Spinal Neurosurgery  Complex Spine Surgery Fellowship Director   of Neurological Surgery  [facility] [employer]  Phone: [phone]  Fax: [phone]        Scribe Attestation  By signing my name below, I, Antonia Carlton   attest that this documentation has been prepared under the direction and in the presence of Spencer HERNANDEZ  MD Hiren.           [1]   Patient Active Problem List  Diagnosis    Abdominal cramping, generalized    Abdominal discomfort, generalized    Abdominal pain    Allergic rhinitis    Arthritis of neck    BPH without urinary obstruction    Cervico-occipital neuralgia    Chronic low back pain    Chronic pain syndrome    Chronic pain of left knee    Constipation    Diverticulitis of rectosigmoid    DJD (degenerative joint disease)    Femoroacetabular impingement    Hematuria, undiagnosed cause    Hyperlipidemia    Hypertension, essential    Insomnia    Lichen planus    Peripheral neuropathy    Urachal cyst    Yeast dermatitis of penis    Arthritis of elbow, left    Osteoarthritis of right hip    Rash and other nonspecific skin eruption    Right hip pain    Sebaceous cyst    Tinea unguium    Trigger finger of right thumb    Lumbar stenosis    Neurogenic claudication due to lumbar spinal stenosis    Spinal stenosis of lumbar region with neurogenic claudication    Phlebitis of superficial vein of upper extremity    Superficial phlebitis of arm    Trigger thumb of right hand    Dry eye syndrome    Cutaneous lupus erythematosus    Long-term current use of hydroxycarbamide    Systemic lupus erythematosus, organ or system involvement unspecified (Multi)    Osteoporosis without current pathological fracture    Encounter for monitoring of hydroxychloroquine therapy   [2]   Past Medical History:  Diagnosis Date    BPH (benign prostatic hyperplasia)     Cervical disc disease     Discoid lupus erythematosus     Diverticulitis     s/p bowel resection 2021    Dry eyes     GERD (gastroesophageal reflux disease)     History of sepsis     2020 - left knee    Hyperlipidemia     Hypertension     Insomnia     Irritable bowel syndrome     Lichen planus     Long-term use of immunosuppressant medication     Lumbar stenosis     Osteoarthritis     Spinal stenosis     Superficial phlebitis of arm     Tinnitus     Trigger finger of right thumb      Trigger finger of thumb     Urachal cyst     Vitamin D deficiency    [3]   Past Surgical History:  Procedure Laterality Date    ABSCESS DRAINAGE  2022    abdominal    ANTERIOR CRUCIATE LIGAMENT REPAIR  04/24/2015    Primary Repair Of Knee Ligament Cruciate Anterior    BOWEL RESECTION      ELBOW FRACTURE SURGERY Left     KNEE ARTHROSCOPY W/ DEBRIDEMENT  04/24/2015    Knee Arthroscopy (Therapeutic)    KNEE ARTHROSCOPY W/ HARDWARE REMOVAL Left     2015    LUMBAR FUSION  04/24/2015    Lumbar Vertebral Fusion    LUMBAR LAMINECTOMY  04/24/2015    Laminectomy Lumbar    SHOULDER ARTHROSCOPY  02/22/2018    Arthroscopy Shoulder Right x 2    SHOULDER ARTHROSCOPY Left     x 3    SPINAL FUSION  12/2023    TOTAL HIP ARTHROPLASTY Right 09/09/2024    UPPER GASTROINTESTINAL ENDOSCOPY     [4]   Current Outpatient Medications:     acetaminophen (Tylenol) 500 mg tablet, , Disp: , Rfl:     ciclopirox 1 % shampoo, Wet hair and apply shampoo to scalp. Lather and leave on for 3 minutes. Rinse. Do this twice a week at night., Disp: 120 mL, Rfl: 11    cyclobenzaprine (Flexeril) 10 mg tablet, Take 1 tablet (10 mg) by mouth 3 times a day as needed for muscle spasms., Disp: 90 tablet, Rfl: 2    diphenhydramine/Maalox/lidocaine (Magic Mouthwash) - Compounded - Outpatient, 60 ml TID swish and swallow prn stomatitis and gastritis, Disp: 480 mL, Rfl: 2    fluticasone (Flonase Sensimist) 27.5 mcg/actuation nasal spray, Administer 1 spray into each nostril once daily as needed for rhinitis., Disp: , Rfl:     hydroxychloroquine (PlaqueniL) 200 mg tablet, Take 1 tablet (200 mg) by mouth once daily., Disp: 90 tablet, Rfl: 1    ketoconazole (NIZOral) 2 % shampoo, Apply 1 Application topically 2 times a week., Disp: , Rfl:     lisinopril 10 mg tablet, Take 1 tablet (10 mg) by mouth once daily., Disp: 90 tablet, Rfl: 2    pantoprazole (ProtoNix) 20 mg EC tablet, Take 1 tablet (20 mg) by mouth., Disp: , Rfl:     rosuvastatin (Crestor) 10 mg tablet, Take 1  tablet (10 mg) by mouth once daily., Disp: 90 tablet, Rfl: 2    sucralfate (Carafate) 1 gram tablet, , Disp: , Rfl:     zolpidem (Ambien) 10 mg tablet, Take 1 tablet (10 mg) by mouth as needed at bedtime for sleep. Do not fill before April 25, 2025., Disp: 30 tablet, Rfl: 5    alendronate (Fosamax) 70 mg tablet, Take 1 tablet (70 mg) by mouth every 7 days. Take in the morning with a full glass of water, on an empty stomach, and do not take anything else by mouth or lie down for the next 30 min., Disp: 12 tablet, Rfl: 2    meloxicam (Mobic) 15 mg tablet, Take 1 tablet (15 mg) by mouth early in the morning.., Disp: , Rfl:     pantoprazole (ProtoNix) 40 mg EC tablet, Take 1 tablet (40 mg) by mouth once daily in the morning. Take before meals. Do not crush, chew, or split. (Patient taking differently: Take 20 mg by mouth once daily in the morning. Take before meals. Do not crush, chew, or split.), Disp: 90 tablet, Rfl: 2    pregabalin (Lyrica) 25 mg capsule, Take 6 capsules (150 mg) by mouth once daily. Patient unsure of dosage, Disp: , Rfl:   [5]   Allergies  Allergen Reactions    Vancomycin Nausea/vomiting, Anxiety and Tinnitus     Other reaction(s): Intolerance   Tinnitus and shaking

## 2025-06-18 ENCOUNTER — APPOINTMENT (OUTPATIENT)
Dept: ORTHOPEDIC SURGERY | Facility: CLINIC | Age: 60
End: 2025-06-18
Payer: COMMERCIAL

## 2025-06-25 ENCOUNTER — OFFICE VISIT (OUTPATIENT)
Dept: ORTHOPEDIC SURGERY | Facility: CLINIC | Age: 60
End: 2025-06-25
Payer: COMMERCIAL

## 2025-06-25 DIAGNOSIS — G89.29 CHRONIC LOW BACK PAIN WITHOUT SCIATICA, UNSPECIFIED BACK PAIN LATERALITY: Primary | ICD-10-CM

## 2025-06-25 DIAGNOSIS — M54.50 CHRONIC LOW BACK PAIN WITHOUT SCIATICA, UNSPECIFIED BACK PAIN LATERALITY: Primary | ICD-10-CM

## 2025-06-25 DIAGNOSIS — M54.2 NECK PAIN: ICD-10-CM

## 2025-06-25 PROCEDURE — 99212 OFFICE O/P EST SF 10 MIN: CPT | Performed by: ORTHOPAEDIC SURGERY

## 2025-06-25 PROCEDURE — 99214 OFFICE O/P EST MOD 30 MIN: CPT | Performed by: ORTHOPAEDIC SURGERY

## 2025-06-25 NOTE — LETTER
June 25, 2025     Buster Barney DO  [address] [address]  [address]  [facility] [address]    Patient: Sharath Weiss   YOB: 1965   Date of Visit: 6/25/2025       Dear Dr. Buster Barney DO:    Thank you for referring Sharath Weiss to me for evaluation. Below are my notes for this consultation.  If you have questions, please do not hesitate to call me. I look forward to following your patient along with you.       Sincerely,     Nasim Quiros MD      CC: No Recipients  ______________________________________________________________________________________    Patient returns for follow-up.  He continues to have cervical radiculopathy, mostly right arm.  It is predominantly in the C6-7 distribution.  He has failed extensive nonsurgical treatment including physical therapy and injections.  He is quite miserable.    He is also having some increasing lower back pain and tingling in his feet with groin pain.  He has lumbar fusion from L2-L5, most recently L2-4 lateral lumbar fusion with me after which she did very well.    He has an MRI of the cervical spine that he brought with him on disc today.  This shows severe bilateral foraminal stenosis at C5-6 and C6-7, also severe right-sided narrowing at C4-5 with moderate central stenosis and abutment of the spinal cord against the disc itself.    We discussed continued conservative care versus surgical treatment for his cervical spine.  I think he would do well with surgery.  He saw one of my colleagues who recommended C5-7 ACDF, I think this is an adequate plan however I would also include the C4-5 level to prevent predictable early junctional stenosis given the degree of degeneration at the C4-5 level with severe foraminal stenosis.    In the meantime we are going to get him started on physical therapy for his lumbar spine.  His most recent x-rays did not show any abnormal alignment at the L5-S1 or L1-2 levels.  If he does not improve following physical therapy we can  get a new MRI of his lumbar spine.    He would like to proceed with cervical surgery he can call the office.    I discussed the risks of surgery including bleeding, infection, paralysis, dysphagia, hoarseness, biceps palsy, muscle weakness, CSF leak, bowel or bladder dysfunction, incomplete resolution of pain or numbness, possible worsening of preoperative symptoms, DVT/PE, heart attack, stroke, and other unforeseen medical and anesthesia complications.  He verbalized understanding of the risks, benefits, and alternatives to surgical treatment.  The plan will be for C4-7 ACDF.      *This note was dictated using speech recognition software and was not corrected for spelling or grammatical errors*

## 2025-06-25 NOTE — PROGRESS NOTES
Endocrinology    Chief Complaint: Osteoporosis    HPI:   Sharath Weiss is a 59 y.o. male with a relevant PMH of discoid lupus, osteoporosis, chronic low back pain, osteoarthritis especially in the bilateral knees, right THR, marcial esophagus, GERD, DLD.  who presents to clinic today for evaluation of osteoporosis.    Patient received Reclast injection end of 5/2025 and afterward experienced pain, limited mobility in elbow, facial flushing, and a temp of 99.5F.     Fracture Risk Assessment for Osteoporosis  -Personal history of other fractures:  only traumatic as a kid, humerus  -Parental history of hip fractures: denies  -Parental history of other fractures: denies  -Parental history of osteoporosis:  denies  -Prior BMD: 4/2025  -Last DXA scan:   -Corticosteroid use:  average of one medrol dose pack a year, 1-2 steroid injections a year  -Cigarette smoking: quit 10 years ago, avg 3 cig/day  -Alcohol intake > 2 units per day: denies  -Rheumatoid arthritis: denies  -Disorders associated with osteoporosis (T1DM, osteogenesis imperfecta, untreated long-standing hyperthyroidism, hypogonadism or premature menopause [age <45], chronic malnutrition, malabsorption (perhaps, has section of colon removed d/t diverticulitis), chronic liver disease):  -Young adult height: 5'11''  -Height loss: none  -Other falls this year:  denies  -Falls last year: denies  -Decreased vision:  harder seeing at night  -Poor balance: denies  -BMI <20: no       Secondary Causes/Medications contributing to osteoporosis or fracture risk:   Hypogonadism/Endocrine: (anorexia nervosa, athletic amenorrhea, hyperprolactinemia, panhypopituitarism, premature menopause (<40 years), Elliott's/Klinefelter's syndromes, Cushing's syndrome, T1DM, T2DM, primary hyperparathyroidism, thyrotoxicosis)  GI: (inflammatory bowel disease, celiac disease, gastric bypass surgery, malabsorption, pancreatic disease, liver disease, primary biliary cirrhosis; partial  colectomy)  Renal: (ESRD/CKD, hypercalciuria, post-transplant, chronic metabolic acidosis)  Rheumatologic: History of systemic lupus erythematosus, ankyloses spondylitis)  Neurologic: (seizure disorder, multiple sclerosis, muscular dystrophy, Parkinson disease, spinal cord injury, stroke)  Hematologic: (myeloma, hemophilia, leukemia, lymphoma, monoclonal gammopathy, sickle cell disease, systemic mastocytosis, thalassemia)  Genetic disease (osteogenesis imperfecta, cystic fibrosis, Florecita-Danlos syndrome, Gaucher's disease, glycogen storage diseases, hemochromatosis, homocystinuria, hypophosphatasia, Marfan syndrome, porphyria)  Miscellaneous: (COPD, CHF, AIDS/HIV, depression, sarcoidosis)  Medications: (glucocorticoids, aromatase inhibitors, GnRH agonists, androgen deprivation therapy, DepoProvera, tamoxifen, heparin, older anticonvulsants (phenytoin, phenobarbital, carbamazepine), cancer chemotherapy, methotrexate, lithium, PPI, SSRI, thiazolidinediones, thyroid hormone [in excess])    Review of Systems  Review of Systems - positive symptoms are in bold  Constitutional: Negative for appetite change, fever and unexpected weight change.   HENT: Negative for trouble swallowing and voice change.    Respiratory: Negative for chest tightness and shortness of breath.    Cardiovascular: Negative for leg swelling. Negative for palpitations . Negative for chest pain.   Gastrointestinal: Negative for abdominal pain. Negative for diarrhea. Negative for constipation  Endocrine: Negative for polydipsia and polyuria.   Genitourinary: Negative for difficulty urinating and dysuria.   Musculoskeletal: Negative for problems with gait. Negative for diffuse myalgias.  Skin: Negative for color change and rash.   Neurological: Negative for weakness, light-headedness and headaches.   Psychiatric/Behavioral: Negative for agitation and confusion. The patient is not nervous/anxious.      Past Medical History  Medical History[1]     Past  Surgical History  Surgical History[2]    Family History  Family History[3]    Social History  Social History[4]        Physical Exam  Physical Exam   Constitutional: oriented to person, place, and time and well-developed, well-nourished, and in no distress.   HENT:   Head: Normocephalic and atraumatic.   Eyes: Conjunctivae and EOM are normal. No lid lag   CV: regular rate  Pulmonary/Chest: normal WOB on RA  Musculoskeletal: Normal range of motion. exhibits no edema.   Neurological: alert and oriented to person, place, and time.   Skin: Skin is dry. No rash noted.   Psych: mood and affect appropriate for clinical situation.    Labs      Lab Results   Component Value Date    ANIONGAP 11 10/08/2024    BUN 15 10/08/2024    CO2 29 10/08/2024    CREATININE 0.88 10/08/2024    K 4.4 10/08/2024     10/08/2024     10/08/2024    GLUCOSE 95 10/08/2024    CALCIUM 9.5 10/08/2024    EGFR >90 10/08/2024    ALBUMIN 4.6 10/08/2024    VITD25 29 (L) 10/08/2024      Latest Reference Range & Units 10/08/24 16:40   Thyroid Stimulating Hormone 0.44 - 3.98 mIU/L 0.65   Vitamin D, 25-Hydroxy, Total 30 - 100 ng/mL 29 (L)   (L): Data is abnormally low    PTH 49 (4/29/25)  25-OH Vit D 31 (4/29/25)    Imaging:    DXA: 4/2025  FINDINGS:   Scan quality: Good.   LEFT FEMORAL NECK:   BMD (in g/cm*2): 0.661.   T-score: -2.0.   Z-score: -1.1.   LEFT TOTAL HIP:   BMD (in g/cm*2): 0.814.   T-score: -1.5.   Z-score: -1.0.   LEFT FOREARM (RADIUS + ULNA 33%):   BMD (in g/cm*2): 0.635.   T-score: -3.3.   Z-score: -2.6.   FRAX 10-YEAR PROBABILITY OF FRACTURE:   10-year fracture risk is performed using the [employer] FRAX calculator based on patient-reported risk factors.   Major osteoporotic fracture: 6.8%.   Hip fracture: 1.0%.   IMPRESSION: Osteoporosis based on BMD.   Fracture risk is increased.   Increased risk based on low BMD.   World Health Organization criteria for BMD impression classify patients as:    - Normal (T-score at  or above -1.0).    - Osteopenia (T-score between -1.0 and -2.5).    - Osteoporosis (T-score at or below -2.5).   Per the Bone Health and Osteoporosis Foundation the FRAX tool is most useful in patients with low femoral neck bone mineral density (osteopenia). FRAX is calculated per request.     Assessment and Plan:  Sharath Weiss is a 59 y.o. male with a relevant PMH of  discoid lupus, osteoporosis, chronic low back pain, osteoarthritis especially in the bilateral knees, right THR, marcial esophagus, GERD, DLD.  who presents to clinic today for evaluation of osteoporosis.    Based on most recent bone density the patient has osteopenia in the L hip. Forearm was assessed because L spine is unable to be assessed d/t metal hardware and R hip has been replaced. Patient demonstrated T scores consistent with osteopenia in the L femoral neck, and osteoporosis in the L forearm. He has been seen by Dr. Day of [facility] who has already treated him with 5 mg Reclast.     Agree with IV bisphosphonate  Repeat DXA in 1 year, approximately 4/2026  Repeat labs prior to our next appt  Continue 1000 mg Ca daily and 2000 international units of Vit D      RTC: 4/2025      I spent a total of 60+ minutes on the date of the service which included preparing to see the patient, face-to-face patient care, completing clinical documentation, obtaining and/or reviewing separately obtained history, performing a medically appropriate examination, counseling and educating the patient/family/caregiver, and ordering medications, tests, or procedures.        Bety Caban MD  Endocrinology          [1]   Past Medical History:  Diagnosis Date    BPH (benign prostatic hyperplasia)     Cervical disc disease     Discoid lupus erythematosus     Diverticulitis     s/p bowel resection 2021    Dry eyes     GERD (gastroesophageal reflux disease)     History of sepsis     2020 - left knee    Hyperlipidemia     Hypertension     Insomnia      Irritable bowel syndrome     Lichen planus     Long-term use of immunosuppressant medication     Lumbar stenosis     Osteoarthritis     Spinal stenosis     Superficial phlebitis of arm     Tinnitus     Trigger finger of right thumb     Trigger finger of thumb     Urachal cyst     Vitamin D deficiency    [2]   Past Surgical History:  Procedure Laterality Date    ABSCESS DRAINAGE      abdominal    ANTERIOR CRUCIATE LIGAMENT REPAIR  2015    Primary Repair Of Knee Ligament Cruciate Anterior    BOWEL RESECTION      ELBOW FRACTURE SURGERY Left     KNEE ARTHROSCOPY W/ DEBRIDEMENT  2015    Knee Arthroscopy (Therapeutic)    KNEE ARTHROSCOPY W/ HARDWARE REMOVAL Left     2015    LUMBAR FUSION  2015    Lumbar Vertebral Fusion    LUMBAR LAMINECTOMY  2015    Laminectomy Lumbar    SHOULDER ARTHROSCOPY  2018    Arthroscopy Shoulder Right x 2    SHOULDER ARTHROSCOPY Left     x 3    SPINAL FUSION  2023    TOTAL HIP ARTHROPLASTY Right 2024    UPPER GASTROINTESTINAL ENDOSCOPY     [3]   Family History  Problem Relation Name Age of Onset    Breast cancer Mother      Hypertension Father      Transient ischemic attack Father      Irritable bowel syndrome Sister     [4]   Social History  Tobacco Use    Smoking status: Former     Current packs/day: 0.00     Types: Cigarettes     Quit date:      Years since quittin.4     Passive exposure: Never    Smokeless tobacco: Never    Tobacco comments:     Pt denies any illness in past 30 days.      Denies SOB with stairs or activity.     Denies need for assist devices for ambulation.     Denies any personal or family reactions to anesthesia.   Vaping Use    Vaping status: Every Day    Substances: Nicotine   Substance Use Topics    Alcohol use: Yes     Alcohol/week: 10.0 standard drinks of alcohol     Types: 10 Cans of beer per week     Comment: MODERATELY    Drug use: Never

## 2025-06-25 NOTE — PROGRESS NOTES
Patient returns for follow-up.  He continues to have cervical radiculopathy, mostly right arm.  It is predominantly in the C6-7 distribution.  He has failed extensive nonsurgical treatment including physical therapy and injections.  He is quite miserable.    He is also having some increasing lower back pain and tingling in his feet with groin pain.  He has lumbar fusion from L2-L5, most recently L2-4 lateral lumbar fusion with me after which she did very well.    He has an MRI of the cervical spine that he brought with him on disc today.  This shows severe bilateral foraminal stenosis at C5-6 and C6-7, also severe right-sided narrowing at C4-5 with moderate central stenosis and abutment of the spinal cord against the disc itself.    We discussed continued conservative care versus surgical treatment for his cervical spine.  I think he would do well with surgery.  He saw one of my colleagues who recommended C5-7 ACDF, I think this is an adequate plan however I would also include the C4-5 level to prevent predictable early junctional stenosis given the degree of degeneration at the C4-5 level with severe foraminal stenosis.    In the meantime we are going to get him started on physical therapy for his lumbar spine.  His most recent x-rays did not show any abnormal alignment at the L5-S1 or L1-2 levels.  If he does not improve following physical therapy we can get a new MRI of his lumbar spine.    He would like to proceed with cervical surgery he can call the office.    I discussed the risks of surgery including bleeding, infection, paralysis, dysphagia, hoarseness, biceps palsy, muscle weakness, CSF leak, bowel or bladder dysfunction, incomplete resolution of pain or numbness, possible worsening of preoperative symptoms, DVT/PE, heart attack, stroke, and other unforeseen medical and anesthesia complications.  He verbalized understanding of the risks, benefits, and alternatives to surgical treatment.  The plan will be  for C4-7 ACDF.      *This note was dictated using speech recognition software and was not corrected for spelling or grammatical errors*

## 2025-06-26 ENCOUNTER — OFFICE VISIT (OUTPATIENT)
Age: 60
End: 2025-06-26
Payer: COMMERCIAL

## 2025-06-26 VITALS — BODY MASS INDEX: 25.9 KG/M2 | HEIGHT: 71 IN | WEIGHT: 185 LBS

## 2025-06-26 DIAGNOSIS — M48.02 CERVICAL SPINAL STENOSIS: ICD-10-CM

## 2025-06-26 DIAGNOSIS — M81.0 OSTEOPOROSIS WITHOUT CURRENT PATHOLOGICAL FRACTURE, UNSPECIFIED OSTEOPOROSIS TYPE: ICD-10-CM

## 2025-06-26 PROCEDURE — 1036F TOBACCO NON-USER: CPT | Performed by: STUDENT IN AN ORGANIZED HEALTH CARE EDUCATION/TRAINING PROGRAM

## 2025-06-26 PROCEDURE — 3008F BODY MASS INDEX DOCD: CPT | Performed by: STUDENT IN AN ORGANIZED HEALTH CARE EDUCATION/TRAINING PROGRAM

## 2025-06-26 PROCEDURE — 99215 OFFICE O/P EST HI 40 MIN: CPT | Performed by: STUDENT IN AN ORGANIZED HEALTH CARE EDUCATION/TRAINING PROGRAM

## 2025-06-26 PROCEDURE — 99205 OFFICE O/P NEW HI 60 MIN: CPT | Performed by: STUDENT IN AN ORGANIZED HEALTH CARE EDUCATION/TRAINING PROGRAM

## 2025-06-26 ASSESSMENT — PATIENT HEALTH QUESTIONNAIRE - PHQ9
2. FEELING DOWN, DEPRESSED OR HOPELESS: NOT AT ALL
1. LITTLE INTEREST OR PLEASURE IN DOING THINGS: NOT AT ALL
SUM OF ALL RESPONSES TO PHQ9 QUESTIONS 1 AND 2: 0

## 2025-06-26 ASSESSMENT — ENCOUNTER SYMPTOMS
DEPRESSION: 0
LOSS OF SENSATION IN FEET: 1
OCCASIONAL FEELINGS OF UNSTEADINESS: 0

## 2025-06-26 NOTE — PATIENT INSTRUCTIONS
After Visit Summary  It was great seeing you today!    In summary from our visit today, I would like to remind you of the following:    Calcium in Your Diet      Food                                                    Amount            Calcium (milligrams)  Yogurt, plain, low fat                           8 oz                 415  Collards, frozen, boiled                       1 cup               357  Skim milk                                             1 cup               306  Yogurt, plain, whole milk                     8 oz                 275  Black-eyed peas, boiled                     1 cup               211  Canned salmon                                   3 oz                 181  Calcium-set tofu                                  3 oz (º block) 163  Cheese food, pasteurized American  1 oz                 162  Trail mix (nuts, seeds, chocolate chips) 1 cup           159  Baked beans, canned                         1 cup               154  Cottage cheese, 1% milk fat               1 cup               138  Iceberg lettuce                                    1 head              97  Green peas, boiled                             1 cup                94  Soy milk                                              1 cup                93  Oranges                                              1 cup                72  Almonds                                              1 oz (24 nuts)   70     Please see this website for more information:     https://www.nof.org/patients/treatment/calciumvitamin-d/a-guide-to-calcium-rich-foods/    Note: These products vary in calcium amounts depending on the brand and type of product. It  is best to look at the nutrition facts label on the product.    Calcium rich foods  To get the calcium your body needs to function and maintain stores, include these foods in your  diet:  Calcium is a mineral in your body that makes up most of your bones and keeps  them strong.   99% of the calcium in your  body is stored in your bones and teeth.   1% of the calcium in your body is in your blood and soft tissues. Without this 1%, your muscles would not contract correctly, blood would not clot, and your  nerves would not carry messages.    How much calcium do I need?  The below table lists recommended amounts of calcium by age. The amounts for males and  females are the same unless noted.    Age Dietary Reference Index (DRI) (mg/day)  Less than 6 months 200  6 to 12 months 260  1 to 3 years 700  4 to 8 years 1,000  9 to 18 years 1,300  Males, 19 to 70 years 1,000  Males, over 70 years 1,200  Females, 19 to 50 years 1,000  Females, over 50 years 1,200 Elemental calcium   Pregnancy or lactation, 14 to 18 years 1,300  Pregnancy or lactation, 19 to 50 years 1,000  Postmenopausal women, not on hormone  replacement therapy  1,200      Note: 1 cup of milk is 300 milligrams (mg). On nutrition labels, add a “zero” to percentage of calcium to get the mg. Example: 30% calcium is 300 mg.     Because of your need for pantoprazole, opt for calcium citrate as it is less dependent on stomach acid for absorption. Aim for 1000 mg of calcium daily.    Continue your 2000 international unit of Vitamin D daily      Division of Endocrinology   Follow-up appointments:  Please arrive at least 20 minutes prior to your follow up appointments in order to keep visits as close as possible to the scheduled times. If you need to cancel an appointment, please call at least 24 hours in advance.    Please bring your medications or an updated list of medications to each of your visits to help ensure safety in prescribing future medications.    If you are being seen for diabetes, please bring your glucose meter and/or glucose log with 2 weeks of glucose readings to each visit.    Medication Refills: Please request medication refills at the time of your appointment.   - Refills requested by phone or mychart in between visits require at least 3 business days  to be processed.    Lab Results: Please allow at least 7 business days for lab results to be reviewed.  - Please note, that some specialty testing may take up to at least 7 business to be completed.   - If there are any urgent issues requiring immediate attention, we will contact you at your provided phone numbers.   - Any non-urgent results will be relayed via [employer] if you have signed up for this service or via a letter through the mail and/or phone call.     Communication with your provider:  - [facility] recommended as the most efficient means to contact your provider. However for urgent concerns please call.   - For phone calls, please call our office Monday- Friday 8am to 4:30pm and your message will be relayed to your provider. Please allows 1-2 business days for a response.  - After hours messages are left with an answering service and will be handled by the clinic the following business day.      Sincerely,   Bety Caban MD  Division of Endocrinology  [facility]

## 2025-07-02 ENCOUNTER — APPOINTMENT (OUTPATIENT)
Dept: ORTHOPEDIC SURGERY | Facility: CLINIC | Age: 60
End: 2025-07-02
Payer: COMMERCIAL

## 2025-07-03 ENCOUNTER — APPOINTMENT (OUTPATIENT)
Dept: ORTHOPEDIC SURGERY | Facility: CLINIC | Age: 60
End: 2025-07-03
Payer: COMMERCIAL

## 2025-07-23 ENCOUNTER — APPOINTMENT (OUTPATIENT)
Dept: ORTHOPEDIC SURGERY | Facility: CLINIC | Age: 60
End: 2025-07-23
Payer: COMMERCIAL

## 2025-07-24 ENCOUNTER — OFFICE (OUTPATIENT)
Dept: URBAN - METROPOLITAN AREA CLINIC 26 | Facility: CLINIC | Age: 60
End: 2025-07-24
Payer: COMMERCIAL

## 2025-07-24 VITALS
SYSTOLIC BLOOD PRESSURE: 127 MMHG | WEIGHT: 191 LBS | HEART RATE: 68 BPM | DIASTOLIC BLOOD PRESSURE: 72 MMHG | HEIGHT: 71 IN

## 2025-07-24 DIAGNOSIS — K57.90 DIVERTICULOSIS OF INTESTINE, PART UNSPECIFIED, WITHOUT PERFO: ICD-10-CM

## 2025-07-24 DIAGNOSIS — K21.9 GASTRO-ESOPHAGEAL REFLUX DISEASE WITHOUT ESOPHAGITIS: ICD-10-CM

## 2025-07-24 DIAGNOSIS — K22.70 BARRETT'S ESOPHAGUS WITHOUT DYSPLASIA: ICD-10-CM

## 2025-07-24 PROCEDURE — 99213 OFFICE O/P EST LOW 20 MIN: CPT | Performed by: NURSE PRACTITIONER

## 2025-07-25 ENCOUNTER — APPOINTMENT (OUTPATIENT)
Dept: NEUROLOGY | Facility: HOSPITAL | Age: 60
End: 2025-07-25
Payer: COMMERCIAL

## 2025-07-25 ENCOUNTER — APPOINTMENT (OUTPATIENT)
Age: 60
End: 2025-07-25
Payer: COMMERCIAL

## 2025-08-07 ENCOUNTER — APPOINTMENT (OUTPATIENT)
Age: 60
End: 2025-08-07
Payer: COMMERCIAL

## 2025-08-08 ENCOUNTER — PHARMACY VISIT (OUTPATIENT)
Dept: PHARMACY | Facility: CLINIC | Age: 60
End: 2025-08-08
Payer: COMMERCIAL

## 2025-08-08 ENCOUNTER — OFFICE VISIT (OUTPATIENT)
Dept: PRIMARY CARE | Facility: CLINIC | Age: 60
End: 2025-08-08
Payer: COMMERCIAL

## 2025-08-08 ENCOUNTER — HOSPITAL ENCOUNTER (OUTPATIENT)
Dept: RADIOLOGY | Facility: CLINIC | Age: 60
Discharge: HOME | End: 2025-08-08
Payer: COMMERCIAL

## 2025-08-08 VITALS
WEIGHT: 186 LBS | DIASTOLIC BLOOD PRESSURE: 68 MMHG | TEMPERATURE: 98.1 F | SYSTOLIC BLOOD PRESSURE: 122 MMHG | BODY MASS INDEX: 25.94 KG/M2

## 2025-08-08 DIAGNOSIS — J20.9 ACUTE BRONCHITIS, UNSPECIFIED ORGANISM: ICD-10-CM

## 2025-08-08 DIAGNOSIS — J18.9 PNEUMONIA OF BOTH LUNGS DUE TO INFECTIOUS ORGANISM, UNSPECIFIED PART OF LUNG: ICD-10-CM

## 2025-08-08 DIAGNOSIS — J20.9 ACUTE BRONCHITIS, UNSPECIFIED ORGANISM: Primary | ICD-10-CM

## 2025-08-08 LAB — POC SARS-COV-2 AG BINAX: NORMAL

## 2025-08-08 PROCEDURE — 71046 X-RAY EXAM CHEST 2 VIEWS: CPT

## 2025-08-08 PROCEDURE — 71046 X-RAY EXAM CHEST 2 VIEWS: CPT | Performed by: RADIOLOGY

## 2025-08-08 PROCEDURE — 3078F DIAST BP <80 MM HG: CPT | Performed by: FAMILY MEDICINE

## 2025-08-08 PROCEDURE — 87811 SARS-COV-2 COVID19 W/OPTIC: CPT | Performed by: FAMILY MEDICINE

## 2025-08-08 PROCEDURE — RXMED WILLOW AMBULATORY MEDICATION CHARGE

## 2025-08-08 PROCEDURE — 3074F SYST BP LT 130 MM HG: CPT | Performed by: FAMILY MEDICINE

## 2025-08-08 PROCEDURE — 99214 OFFICE O/P EST MOD 30 MIN: CPT | Performed by: FAMILY MEDICINE

## 2025-08-08 RX ORDER — CEFDINIR 300 MG/1
300 CAPSULE ORAL 2 TIMES DAILY
Qty: 20 CAPSULE | Refills: 0 | Status: SHIPPED | OUTPATIENT
Start: 2025-08-08 | End: 2025-08-18

## 2025-08-08 RX ORDER — CEFDINIR 300 MG/1
300 CAPSULE ORAL 2 TIMES DAILY
Qty: 20 CAPSULE | Refills: 0 | Status: SHIPPED | OUTPATIENT
Start: 2025-08-08 | End: 2025-08-08

## 2025-08-08 RX ORDER — ALBUTEROL SULFATE 90 UG/1
2 INHALANT RESPIRATORY (INHALATION) EVERY 4 HOURS PRN
Qty: 8.5 G | Refills: 0 | Status: SHIPPED | OUTPATIENT
Start: 2025-08-08 | End: 2026-08-08

## 2025-08-08 RX ORDER — ALBUTEROL SULFATE 90 UG/1
2 INHALANT RESPIRATORY (INHALATION) EVERY 4 HOURS PRN
Qty: 8.5 G | Refills: 0 | Status: SHIPPED | OUTPATIENT
Start: 2025-08-08 | End: 2025-08-08

## 2025-08-08 NOTE — PROGRESS NOTES
Subjective   Patient ID: [id number]     Sharath Weiss is a 59 y.o. male who presents for Cough; nasal drainage; scratchy throat; Fatigue; Weakness, Gen; and Fever (Symptoms X 8 days.).  HPI  He complains of cough, nasal drainage, scratchy throat, weakness and fever.      Symptoms started eight days ago.     The fever is gone.  The cough has been getting bad.  He does not feel any better other than the fever being gone.     He is irrigating his nose. Sinus congestion.    He did a covid test that was negative.               Objective     /68 (BP Location: Right arm, Patient Position: Sitting)   Temp 36.7 °C (98.1 °F) (Skin)   Wt 84.4 kg (186 lb)   BMI 25.94 kg/m²      Physical Exam  Constitutional:       Appearance: Normal appearance.   HENT:      Nose: No congestion or rhinorrhea.      Mouth/Throat:      Pharynx: No oropharyngeal exudate or posterior oropharyngeal erythema.     Cardiovascular:      Rate and Rhythm: Normal rate and regular rhythm.      Heart sounds: Normal heart sounds.   Pulmonary:      Effort: Pulmonary effort is normal. No respiratory distress.      Breath sounds: Wheezing and rhonchi present.      Comments: Significant rhonchi/gurgling in chest.  Bilaterally.  Ox sat 98.  .    Neurological:      Mental Status: He is alert.         Assessment/Plan   Problem List Items Addressed This Visit    None  Visit Diagnoses         Acute bronchitis, unspecified organism    -  Primary    Relevant Medications    albuterol (ProAir HFA) 90 mcg/actuation inhaler    cefdinir (Omnicef) 300 mg capsule    Other Relevant Orders    POCT BinaxNOW Covid-19 Ag Card manually resulted      Pneumonia of both lungs due to infectious organism, unspecified part of lung        Relevant Medications    albuterol (ProAir HFA) 90 mcg/actuation inhaler    cefdinir (Omnicef) 300 mg capsule    Other Relevant Orders    POCT BinaxNOW Covid-19 Ag Card manually resulted          I will order antibiotics and a chest xray.   Return to see Dr Barney in one week for a recheck.   Eder Browne, DO

## 2025-08-11 ENCOUNTER — TELEPHONE (OUTPATIENT)
Dept: PRIMARY CARE | Facility: CLINIC | Age: 60
End: 2025-08-11
Payer: COMMERCIAL

## 2025-08-11 DIAGNOSIS — J20.9 ACUTE BRONCHITIS, UNSPECIFIED ORGANISM: Primary | ICD-10-CM

## 2025-08-11 RX ORDER — PREDNISONE 50 MG/1
50 TABLET ORAL DAILY
Qty: 5 TABLET | Refills: 0 | Status: SHIPPED | OUTPATIENT
Start: 2025-08-11 | End: 2025-08-16

## 2025-08-12 ENCOUNTER — APPOINTMENT (OUTPATIENT)
Dept: RADIOLOGY | Facility: CLINIC | Age: 60
End: 2025-08-12
Payer: COMMERCIAL

## 2025-08-12 ENCOUNTER — APPOINTMENT (OUTPATIENT)
Dept: ORTHOPEDIC SURGERY | Facility: CLINIC | Age: 60
End: 2025-08-12
Payer: COMMERCIAL

## 2025-08-12 DIAGNOSIS — M16.12 PRIMARY LOCALIZED OSTEOARTHRITIS OF LEFT HIP: ICD-10-CM

## 2025-08-12 DIAGNOSIS — K29.50 CHRONIC GASTRITIS WITHOUT BLEEDING, UNSPECIFIED GASTRITIS TYPE: ICD-10-CM

## 2025-09-26 ENCOUNTER — APPOINTMENT (OUTPATIENT)
Dept: ENDOCRINOLOGY | Facility: CLINIC | Age: 60
End: 2025-09-26
Payer: COMMERCIAL

## 2025-10-09 ENCOUNTER — APPOINTMENT (OUTPATIENT)
Dept: PRIMARY CARE | Facility: CLINIC | Age: 60
End: 2025-10-09
Payer: COMMERCIAL

## 2025-11-21 ENCOUNTER — APPOINTMENT (OUTPATIENT)
Dept: DERMATOLOGY | Facility: CLINIC | Age: 60
End: 2025-11-21
Payer: COMMERCIAL

## 2026-04-21 ENCOUNTER — APPOINTMENT (OUTPATIENT)
Dept: OPHTHALMOLOGY | Facility: CLINIC | Age: 61
End: 2026-04-21
Payer: COMMERCIAL

## (undated) DEVICE — GOWN, ASTOUND, XL

## (undated) DEVICE — CORD, CAUTERY, BIOPOLAR FORCEP, 12FT

## (undated) DEVICE — PROTECTOR, NERVE, ULNAR, PINK

## (undated) DEVICE — KIT, MINOR, DOUBLE BASIN

## (undated) DEVICE — DRESSING, SPONGE, GAUZE, CURITY, 4 X 4 IN, STERILE

## (undated) DEVICE — DRAPE, SHEET, UTILITY, NON ABSORBENT, 18 X 26 IN, LF

## (undated) DEVICE — COVER, C-ARM W/CLIPS, OEC GE

## (undated) DEVICE — GLOVE, SURGICAL, PROTEXIS PI ORTHO, 7.5, PF, LF

## (undated) DEVICE — GLOVE, SURGICAL, PROTEXIS MICRO, 8.0, PF, LATEX

## (undated) DEVICE — Device

## (undated) DEVICE — TISSUE ADHESIVE, EXOFIN, 1ML

## (undated) DEVICE — CUFF, TOURNIQUET 18 DUAL PORT/SNGL BLAD"

## (undated) DEVICE — GLOVE, SURGICAL, PROTEXIS PI BLUE W/NEUTHERA, 8.0, PF, LF

## (undated) DEVICE — DRAPE, INSTRUMENT, W/POUCH, STERI DRAPE, 9 5/8 X 18 LONG

## (undated) DEVICE — DRAPE COVER, C ARM, FLOUROSCAN IMAGING SYS

## (undated) DEVICE — SEALANT, HEMOSTATIC, FLOSEAL, 10 ML

## (undated) DEVICE — DRESSING, TRANSPARENT, TEGADERM, 8 X 12 IN

## (undated) DEVICE — APPLICATOR, CHLORAPREP, W/ORANGE TINT, 26ML

## (undated) DEVICE — ACCESS KIT, MAXCESS 4 SURGICAL

## (undated) DEVICE — KIT, XLIF NVM5 DISP

## (undated) DEVICE — MODULE, NEEDLE EMG M5

## (undated) DEVICE — PADDING, WEBRIL, UNDERCAST, STERILE, 3 IN

## (undated) DEVICE — GLOVE, SURGICAL, PROTEXIS PI , 7.5, PF, LF

## (undated) DEVICE — BANDAGE, ELASTIC, ACE, SELF-CLOSURE, 3 IN X 5 YD, NONSTERILE

## (undated) DEVICE — SKIN CLOSURE SYS, PREMIERPRO EXOFIN, 1-4CM X 22CM, 1.75G TUBE

## (undated) DEVICE — SUTURE, VICRYL, 2-0, 27 IN, FSL, UNDYED